# Patient Record
Sex: FEMALE | Race: WHITE | NOT HISPANIC OR LATINO | Employment: UNEMPLOYED | URBAN - METROPOLITAN AREA
[De-identification: names, ages, dates, MRNs, and addresses within clinical notes are randomized per-mention and may not be internally consistent; named-entity substitution may affect disease eponyms.]

---

## 2017-03-10 ENCOUNTER — HOSPITAL ENCOUNTER (EMERGENCY)
Facility: CLINIC | Age: 42
Discharge: HOME | End: 2017-03-10
Attending: EMERGENCY MEDICINE | Admitting: EMERGENCY MEDICINE
Payer: COMMERCIAL

## 2017-03-10 VITALS
RESPIRATION RATE: 18 BRPM | TEMPERATURE: 98.5 F | HEIGHT: 66 IN | BODY MASS INDEX: 32.14 KG/M2 | HEART RATE: 65 BPM | DIASTOLIC BLOOD PRESSURE: 71 MMHG | OXYGEN SATURATION: 98 % | WEIGHT: 200 LBS | SYSTOLIC BLOOD PRESSURE: 100 MMHG

## 2017-03-10 DIAGNOSIS — R51.9 ACUTE INTRACTABLE HEADACHE, UNSPECIFIED HEADACHE TYPE: ICD-10-CM

## 2017-03-10 PROCEDURE — 25000128 H RX IP 250 OP 636: Performed by: EMERGENCY MEDICINE

## 2017-03-10 PROCEDURE — 96374 THER/PROPH/DIAG INJ IV PUSH: CPT

## 2017-03-10 PROCEDURE — 25000125 ZZHC RX 250: Performed by: EMERGENCY MEDICINE

## 2017-03-10 PROCEDURE — 96375 TX/PRO/DX INJ NEW DRUG ADDON: CPT

## 2017-03-10 PROCEDURE — 99284 EMERGENCY DEPT VISIT MOD MDM: CPT | Mod: 25

## 2017-03-10 RX ORDER — KETOROLAC TROMETHAMINE 15 MG/ML
15 INJECTION, SOLUTION INTRAMUSCULAR; INTRAVENOUS ONCE
Status: COMPLETED | OUTPATIENT
Start: 2017-03-10 | End: 2017-03-10

## 2017-03-10 RX ORDER — PREGABALIN 225 MG/1
225 CAPSULE ORAL 2 TIMES DAILY
COMMUNITY
Start: 2016-10-25 | End: 2017-11-29

## 2017-03-10 RX ORDER — MECLIZINE HYDROCHLORIDE 25 MG/1
25 TABLET ORAL
COMMUNITY
Start: 2016-08-26 | End: 2018-03-16

## 2017-03-10 RX ORDER — CYCLOBENZAPRINE HCL 10 MG
10 TABLET ORAL AT BEDTIME
COMMUNITY
Start: 2016-08-05

## 2017-03-10 RX ORDER — DEXAMETHASONE SODIUM PHOSPHATE 10 MG/ML
10 INJECTION, SOLUTION INTRAMUSCULAR; INTRAVENOUS ONCE
Status: COMPLETED | OUTPATIENT
Start: 2017-03-10 | End: 2017-03-10

## 2017-03-10 RX ORDER — DIPHENHYDRAMINE HYDROCHLORIDE 50 MG/ML
25 INJECTION INTRAMUSCULAR; INTRAVENOUS ONCE
Status: COMPLETED | OUTPATIENT
Start: 2017-03-10 | End: 2017-03-10

## 2017-03-10 RX ORDER — METOCLOPRAMIDE HYDROCHLORIDE 5 MG/ML
10 INJECTION INTRAMUSCULAR; INTRAVENOUS ONCE
Status: COMPLETED | OUTPATIENT
Start: 2017-03-10 | End: 2017-03-10

## 2017-03-10 RX ORDER — NORTRIPTYLINE HYDROCHLORIDE 50 MG/1
CAPSULE ORAL
COMMUNITY
Start: 2017-02-20 | End: 2019-11-06

## 2017-03-10 RX ORDER — HYDROCHLOROTHIAZIDE 25 MG/1
25 TABLET ORAL
COMMUNITY
Start: 2017-01-20 | End: 2017-11-29

## 2017-03-10 RX ORDER — PANTOPRAZOLE SODIUM 40 MG/1
40 TABLET, DELAYED RELEASE ORAL EVERY MORNING
COMMUNITY
Start: 2016-03-24 | End: 2021-03-06

## 2017-03-10 RX ADMIN — DEXAMETHASONE SODIUM PHOSPHATE 10 MG: 10 INJECTION, SOLUTION INTRAMUSCULAR; INTRAVENOUS at 12:17

## 2017-03-10 RX ADMIN — METOCLOPRAMIDE 10 MG: 5 INJECTION, SOLUTION INTRAMUSCULAR; INTRAVENOUS at 12:17

## 2017-03-10 RX ADMIN — DIPHENHYDRAMINE HYDROCHLORIDE 25 MG: 50 INJECTION, SOLUTION INTRAMUSCULAR; INTRAVENOUS at 12:17

## 2017-03-10 RX ADMIN — KETOROLAC TROMETHAMINE 15 MG: 15 INJECTION, SOLUTION INTRAMUSCULAR; INTRAVENOUS at 12:17

## 2017-03-10 ASSESSMENT — ENCOUNTER SYMPTOMS
PHOTOPHOBIA: 1
HEADACHES: 1
NAUSEA: 1
NUMBNESS: 1

## 2017-03-10 NOTE — ED NOTES
Headache off and on for 2 weeks.  Trying regular headache meds at home and physical therapy and botox this week and continues to have worsening pain.  Also has an RA Flare up and asthma.

## 2017-03-10 NOTE — ED AVS SNAPSHOT
[facility] Emergency Department    [address]    [facility] [address]    Phone:  [phone]    Fax:  [phone]                                       Holli Thacker   MRN: [id number]    Department:  [facility] Emergency Department   Date of Visit:  3/10/2017           After Visit Summary Signature Page     I have received my discharge instructions, and my questions have been answered. I have discussed any challenges I see with this plan with the nurse or doctor.    ..........................................................................................................................................  Patient/Patient Representative Signature      ..........................................................................................................................................  Patient Representative Print Name and Relationship to Patient    ..................................................               ................................................  Date                                            Time    ..........................................................................................................................................  Reviewed by Signature/Title    ...................................................              ..............................................  Date                                                            Time

## 2017-03-10 NOTE — ED NOTES
IV d/c'd with catheter intact. Pressure applied until hemostasis ach'd. Pressure dressing applied. Site without edema, erythema, or pain.

## 2017-03-10 NOTE — ED AVS SNAPSHOT
[facility] Emergency Department    [address]    [facility] [address]    Phone:  [phone]    Fax:  [phone]                                       Holli Thacker   MRN: [id number]    Department:  [facility] Emergency Department   Date of Visit:  3/10/2017           Patient Information     Date Of Birth          1975        Your diagnoses for this visit were:     Acute intractable headache, unspecified headache type        You were seen by Monse Saini MD.      Follow-up Information     Follow up with [facility] Emergency Department.    Specialty:  EMERGENCY MEDICINE    Why:  As needed, If symptoms worsen    Contact information:    [address]  [facility] [address] [phone]        Discharge Instructions       Discharge Instructions  Headache    You were seen today for a headache. Headaches may be caused by many different things such as muscle tension, sinus inflammation, anxiety and stress, having too little sleep, too much alcohol, some medical conditions or injury. You may have a migraine, which is caused by changes in the blood vessels in your head.  At this time your doctor does not find that your headache is a sign of anything dangerous or life-threatening.  However, sometimes the signs of serious illness do not show up right away.  If you have new or worse symptoms, you may need to be seen again in the emergency department or by your primary doctor.      Return to the Emergency Department if:    You get a fever of 101 F or higher.    Your headache gets much worse.    You get a stiff neck with your headache.    You get a new headache that is different or worse than headaches you have had before.    You are vomiting and can t keep food or water down.    You have blurry or double vision or other problems with your eyes.    You have a new weakness on one side of your body.    You have difficulty with  balance which is new.    You or your family thinks you are confused.    You have a seizure or convulsion.    What can I do to help myself?    Pain medications - You may take a pain medication such as Tylenol  (acetaminophen), Advil , Nuprin  (ibuprofen) or Aleve  (naproxen).  If you have been given a narcotic such as Vicodin  (hydrocodone with acetaminophen), Percocet  (oxycodone with acetaminophen), codeine, or a muscle relaxant such as Flexeril  (cyclobenzaprine) or Soma  (carisoprodol), do not drive for four hours after you have taken it. If the narcotic contains Tylenol  (acetaminophen), do not take Tylenol  with it. All narcotics will cause constipation, so eat a high fiber diet.        Take a pain reliever as soon as you notice symptoms.  Starting medications as soon as you start to have symptoms may lessen the amount of pain you have.    Relaxing in a quiet, dark room may help.    Get enough sleep and eat meals regularly.    Schedule an appointment with your primary physician as instructed, or at least within 1 week.    You may need to watch for certain foods or other things which may trigger your headaches.  Keeping a journal of your headaches and possible triggers may help you and your primary doctor to identify things which you should avoid which may be causing your headaches.  If you were given a prescription for medicine here today, be sure to read all of the information (including the package insert) that comes with your prescription.  This will include important information about the medicine, its side effects, and any warnings that you need to know about.  The pharmacist who fills the prescription can provide more information and answer questions you may have about the medicine.  If you have questions or concerns that the pharmacist cannot address, please call or return to the Emergency Department.       24 Hour Appointment Hotline       To make an appointment at any [facility], call  [phone] ([phone]). If you don't have a family doctor or clinic, we will help you find one. [place] clinics are conveniently located to serve the needs of you and your family.             Review of your medicines      Our records show that you are taking the medicines listed below. If these are incorrect, please call your family doctor or clinic.        Dose / Directions Last dose taken    albuterol (2.5 MG/3ML) 0.083% neb solution   Dose:  1 vial   Quantity:  1 Box        Take 1 vial (2.5 mg) by nebulization every 4 hours as needed for shortness of breath / dyspnea or wheezing   Refills:  0        cyclobenzaprine 10 MG tablet   Commonly known as:  FLEXERIL   Dose:  10 mg        Take 10 mg by mouth   Refills:  0        fluticasone 50 MCG/ACT spray   Commonly known as:  FLONASE        Spray into both nostrils daily   Refills:  0        fluticasone-salmeterol 500-50 MCG/DOSE diskus inhaler   Commonly known as:  ADVAIR   Dose:  1 puff        Inhale 1 puff into the lungs 2 times daily   Refills:  0        hydrochlorothiazide 25 MG tablet   Commonly known as:  HYDRODIURIL   Dose:  25 mg        Take 25 mg by mouth   Refills:  0        ipratropium - albuterol 0.5 mg/2.5 mg/3 mL 0.5-2.5 (3) MG/3ML neb solution   Commonly known as:  DUONEB   Dose:  1 vial   Quantity:  1 Box        Take 1 vial (3 mLs) by nebulization 3 times daily   Refills:  3        meclizine 25 MG tablet   Commonly known as:  ANTIVERT   Dose:  25 mg        Take 25 mg by mouth   Refills:  0        nortriptyline 50 MG capsule   Commonly known as:  PAMELOR        Take on capsule at betime   Refills:  0        omalizumab 150 MG injection   Commonly known as:  XOLAIR   Dose:  150 mg        Inject 150 mg Subcutaneous See Admin Instructions   Refills:  0        pantoprazole 40 MG EC tablet   Commonly known as:  PROTONIX   Dose:  40 mg        Take 40 mg by mouth   Refills:  0        Pregabalin 225 MG Caps   Dose:  225 mg        Take 225 mg by mouth 2  times daily   Refills:  0        SINGULAIR PO   Dose:  10 mg        Take 10 mg by mouth   Refills:  0        ZOLOFT PO   Dose:  100 mg        Take 100 mg by mouth daily   Refills:  0                Orders Needing Specimen Collection     None      Pending Results     No orders found from 3/8/2017 to 3/11/2017.            Pending Culture Results     No orders found from 3/8/2017 to 3/11/2017.             Test Results from your hospital stay            Clinical Quality Measure: Blood Pressure Screening     Your blood pressure was checked while you were in the emergency department today. The last reading we obtained was  BP: 122/86 . Please read the guidelines below about what these numbers mean and what you should do about them.  If your systolic blood pressure (the top number) is less than 120 and your diastolic blood pressure (the bottom number) is less than 80, then your blood pressure is normal. There is nothing more that you need to do about it.  If your systolic blood pressure (the top number) is 120-139 or your diastolic blood pressure (the bottom number) is 80-89, your blood pressure may be higher than it should be. You should have your blood pressure rechecked within a year by a primary care provider.  If your systolic blood pressure (the top number) is 140 or greater or your diastolic blood pressure (the bottom number) is 90 or greater, you may have high blood pressure. High blood pressure is treatable, but if left untreated over time it can put you at risk for heart attack, stroke, or kidney failure. You should have your blood pressure rechecked by a primary care provider within the next 4 weeks.  If your provider in the emergency department today gave you specific instructions to follow-up with your doctor or provider even sooner than that, you should follow that instruction and not wait for up to 4 weeks for your follow-up visit.        Thank you for choosing [facility]       Thank you for choosing [facility]  for your care. Our goal is always to provide you with excellent care. Hearing back from our patients is one way we can continue to improve our services. Please take a few minutes to complete the written survey that you may receive in the mail after you visit with us. Thank you!        MOV[facility] Information     [facility] gives you secure access to your electronic health record. If you see a primary care provider, you can also send messages to your care team and make appointments. If you have questions, please call your primary care clinic.  If you do not have a primary care provider, please call [phone] and they will assist you.        Care EveryWhere ID     This is your Care EveryWhere ID. This could be used by other organizations to access your [place] medical records  [id number]        After Visit Summary       This is your record. Keep this with you and show to your community pharmacist(s) and doctor(s) at your next visit.

## 2017-03-10 NOTE — ED PROVIDER NOTES
History     Chief Complaint:    Headache      HPI   Holli Thacker is a 41 year old female with a history of migraines and chiari malformation who presents with a headache. The patient states that she has had an intermittent headache for the last two weeks, which is similar to her normal presentation of migraine. Specifically, this is a frontal headache with radiation to the back of her head. She has associated nausea, photophobia, and sensitivity to sound. The patient also states that she has some left sided facial numbness, but this is typical for her severe migraine headaches. The patient states that she also has rheumatoid arthritis and is currently having a flare; she is on 20 mg of prednisone at this time for this. The patient states that she has been consulting with her neurologist for her current headache, and that she has only received temporary relief with physical therapy, a botox injection, naproxen, promethazine, and a triptan. She states that her headache became unbearable this morning and she presents to the emergency department for further evaluation and treatment.        Allergies:  Azithromycin  Erythromycin  Verapamil    Medications:    Pregabalin 225 mg caps  Pantoprazole (protonix) 40 mg ec tablet  Omalizumab (xolair) 150 mg injection  Meclizine (antivert) 25 mg tablet  Cyclobenzaprine (flexeril) 10 mg tablet  Nortriptyline (pamelor) 50 mg capsule  Hydrochlorothiazide (hydrodiuril) 25 mg tablet  Ipratropium - albuterol 0.5 mg/2.5 mg/3 ml (duoneb) 0.5-2.5 (3) mg/3ml nebulization  Albuterol (2.5 mg/3ml) 0.083% nebulizer solution  Sertraline hcl (zoloft po)  Fluticasone-salmeterol (advair) 500-50 mcg/dose diskus inhaler  Fluticasone (flonase) 50 mcg/act nasal spray  Montelukast sodium (singulair po)    Past Medical History:    Anxiety  Arnold-Chiari malformation, type I (H)  Depressive disorder  Fibromyalgia  Migraine  Rheumatoid arthritis flare (H)  Uncomplicated asthma    Past Surgical  History:    Gyn surgery   Ovary surgery     Family History:    History reviewed. No pertinent family history.     Social History:  Marital Status:   Presents to the ED alone  Tobacco Use: Current everyday smoker  Alcohol Use: No  PCP: Holly Fung      Review of Systems   HENT:        Positive for sensitivity to sound   Eyes: Positive for photophobia.   Gastrointestinal: Positive for nausea.   Neurological: Positive for numbness and headaches.   All other systems reviewed and are negative.        Physical Exam   First Vitals:  BP: 122/86  Pulse: 96  Temp: 98.5  F (36.9  C)  Resp: 18  SpO2: 98 %    Physical Exam    General/Appearance: appears stated age, well-groomed, appears comfortable  Eyes: EOMI, no scleral injection, no icterus  ENT: MMM  Neck: supple, nl ROM, no stiffness  Cardiovascular: RRR, nl S1S2, no m/r/g, 2+ pulses in all 4 extremities, cap refill <2sec  Respiratory: CTAB, good air movement throughout, no wheezes/rhonchi/rales, no increased WOB, no retractions  MSK: ROMERO, good tone, no bony abnormality  Skin: warm and well-perfused, no rash, no edema, no ecchymosis, nl turgor  Neuro: GCS 15, alert and oriented, no gross focal neuro deficits  Psych: interacts appropriately  Heme: no petechia, no purpura, no active bleeding      Emergency Department Course     Interventions:  (1217) Decadron, 10 mg, IV  (1217) Toradol, 15 mg, IV injection   (1217) Reglan, 10 mg, IV   (1217) Benadryl, 25 mg, IV injection     Emergency Department Course:  Nursing notes and vitals reviewed.  I performed an exam of the patient as documented above.   (0215) I rechecked on the patient. She is feeling much better with the above interventions.   Findings and plan explained to the patient. Patient discharged home with instructions regarding supportive care, medications, and reasons to return. The importance of close follow-up was reviewed.     Impression & Plan      Medical Decision Making:  This pt presents with a  headache consistent with a primary headache, consistent with her usual headaches.  The patient's neurologic exam in the emergency department is normal. The patient has not had any fever, weakness, paresthesia, confusion. The numbness she has to her L face is consistent with sxs she's had in the past (and is followed by Neuro for). Meningitis/intracranial infection, subarachnoid hemorrhage/intracranial hemorrhage, CNS tumor, increased IOP, temporal arteritis, and stroke/venous thrombosis are considered as part of the differential. This is not the worst headache of the pt's  life and it started gradually, so I believe subarachnoid hemorrhage is unlikely. There has been no recent trauma, nor is the pt on blood thinners, which lower my concern for intracranial bleed. The patient is afebrile without neck stiffness, so I believe meningitis is unlikely/ The patient has a normal neurologic exam so CNS tumor, stroke, Venous thrombosis are unlikely.   The pain has improved with medication interventions. The pt has been Instructed to return if their pain increases, they develop a fever, neuro sxs, vomiting, or any other new and concerning symptoms.    Diagnosis:    ICD-10-CM    1. Acute intractable headache, unspecified headache type R51      Disposition:  Discharge to home.        I, Curtis Martinez, am serving as a scribe on 3/10/2017 at 11:47 AM to personally document services performed by Dr. Saini based on my observations and the provider's statements to me.     3/10/2017   [facility] EMERGENCY DEPARTMENT       Monse Saini MD  03/10/17 1524

## 2017-06-03 ENCOUNTER — HEALTH MAINTENANCE LETTER (OUTPATIENT)
Age: 42
End: 2017-06-03

## 2017-11-13 DIAGNOSIS — L50.1 URTICARIA, IDIOPATHIC: Primary | ICD-10-CM

## 2017-11-29 ENCOUNTER — INFUSION THERAPY VISIT (OUTPATIENT)
Dept: INFUSION THERAPY | Facility: CLINIC | Age: 42
End: 2017-11-29
Attending: INTERNAL MEDICINE
Payer: COMMERCIAL

## 2017-11-29 VITALS
WEIGHT: 183.6 LBS | TEMPERATURE: 98.8 F | BODY MASS INDEX: 29.63 KG/M2 | SYSTOLIC BLOOD PRESSURE: 117 MMHG | DIASTOLIC BLOOD PRESSURE: 62 MMHG | RESPIRATION RATE: 16 BRPM | OXYGEN SATURATION: 99 % | HEART RATE: 90 BPM

## 2017-11-29 DIAGNOSIS — L50.1 URTICARIA, IDIOPATHIC: Primary | ICD-10-CM

## 2017-11-29 PROCEDURE — 96372 THER/PROPH/DIAG INJ SC/IM: CPT

## 2017-11-29 PROCEDURE — 25000128 H RX IP 250 OP 636: Performed by: INTERNAL MEDICINE

## 2017-11-29 RX ORDER — GLUCOSAMINE HCL 500 MG
5000 TABLET ORAL DAILY
COMMUNITY

## 2017-11-29 RX ADMIN — OMALIZUMAB 300 MG: 202.5 INJECTION, SOLUTION SUBCUTANEOUS at 13:08

## 2017-11-29 NOTE — PROGRESS NOTES
Infusion Nursing Note:  Holli Thacker presents today for Xolair.    Patient seen by provider today: No   present during visit today: Not Applicable.    Note: Patient has been getting Xolair at [facility].     Intravenous Access:  No Intravenous access/labs at this visit.    Treatment Conditions:  Not Applicable.      Post Infusion Assessment:  Patient tolerated injection without incident.  Patient observed for 20 minutes post Xolair per protocol.  She refused to stay for 30 minutes because she had an appt at 1400 in [facility].    Discharge Plan:   Patient declined prescription refills.  Copy of AVS reviewed with patient and/or family.  Patient will return 12/20 for next appointment.    Mercedes Breaux RN

## 2017-11-29 NOTE — MR AVS SNAPSHOT
After Visit Summary   11/29/2017    Holli Thacker    MRN: [id number]           Patient Information     Date Of Birth          1975        Visit Information        Provider Department      11/29/2017 12:30 PM [facility] INFUSION CHAIR 11 [facility]        Today's Diagnoses     Urticaria, idiopathic    -  1       Follow-ups after your visit        Your next 10 appointments already scheduled     Dec 20, 2017 12:30 PM CST   Level 1 with RH INFUSION CHAIR 3   [facility] ([facility])    [facility]  [address]  [facility] [id number] [phone]              Who to contact     If you have questions or need follow up information about today's clinic visit or your schedule please contact [facility] directly at [phone].  Normal or non-critical lab and imaging results will be communicated to you by Alianzahart, letter or phone within 4 business days after the clinic has received the results. If you do not hear from us within 7 days, please contact the clinic through Alianz[facility]art or phone. If you have a critical or abnormal lab result, we will notify you by phone as soon as possible.  Submit refill requests through [facility] or call your pharmacy and they will forward the refill request to us. Please allow 3 business days for your refill to be completed.          Additional Information About Your Visit        MyChart Information     [facility] gives you secure access to your electronic health record. If you see a primary care provider, you can also send messages to your care team and make appointments. If you have questions, please call your primary care clinic.  If you do not have a primary care provider, please call [phone] and they will assist you.        Care EveryWhere ID     This is your Care EveryWhere ID. This could be used by other organizations  to access your [place] medical records  [id number]        Your Vitals Were     Pulse Temperature Respirations Pulse Oximetry BMI (Body Mass Index)       90 98.8  F (37.1  C) (Tympanic) 16 99% 29.63 kg/m2        Blood Pressure from Last 3 Encounters:   11/29/17 117/62   03/10/17 100/71   08/15/16 128/72    Weight from Last 3 Encounters:   11/29/17 83.3 kg (183 lb 9.6 oz)   03/10/17 90.7 kg (200 lb)   08/15/16 87.1 kg (192 lb)              Today, you had the following     No orders found for display         Today's Medication Changes          These changes are accurate as of: 11/29/17  2:47 PM.  If you have any questions, ask your nurse or doctor.               Stop taking these medicines if you haven't already. Please contact your care team if you have questions.     Pregabalin 225 MG Caps                    Primary Care Provider Office Phone # Fax #    Holly Fung [phone]       [facility] [address] [facility] [address]        Equal Access to Services     [facility]: Hadii aad ku hadasho Soarielali, waaxda luqadaha, qaybta kaalmada roxana, anaid reyes . So [facility] [phone].    ATENCIÓN: Si habla español, tiene a trinidad disposición servicios gratuitos de asistencia lingüística. [facility] [phone].    We comply with applicable federal civil rights laws and [place] laws. We do not discriminate on the basis of race, color, national origin, age, disability, sex, sexual orientation, or gender identity.            Thank you!     Thank you for choosing [facility]  for your care. Our goal is always to provide you with excellent care. Hearing back from our patients is one way we can continue to improve our services. Please take a few minutes to complete the written survey that you may receive in the mail after your visit with us. Thank you!             Your Updated Medication List - Protect others  around you: Learn how to safely use, store and throw away your medicines at www.disposemymeds.org.          This list is accurate as of: 11/29/17  2:47 PM.  Always use your most recent med list.                   Brand Name Dispense Instructions for use Diagnosis    albuterol (2.5 MG/3ML) 0.083% neb solution     1 Box    Take 1 vial (2.5 mg) by nebulization every 4 hours as needed for shortness of breath / dyspnea or wheezing        cyclobenzaprine 10 MG tablet    FLEXERIL     Take 10 mg by mouth        fluticasone 50 MCG/ACT spray    FLONASE     Spray into both nostrils daily        fluticasone-salmeterol 500-50 MCG/DOSE diskus inhaler    ADVAIR     Inhale 1 puff into the lungs 2 times daily        ipratropium - albuterol 0.5 mg/2.5 mg/3 mL 0.5-2.5 (3) MG/3ML neb solution    DUONEB    1 Box    Take 1 vial (3 mLs) by nebulization 3 times daily        meclizine 25 MG tablet    ANTIVERT     Take 25 mg by mouth        NAPROXEN PO      Take 500 mg by mouth 2 times daily as needed for moderate pain        nortriptyline 50 MG capsule    PAMELOR     Take on capsule at betime        omalizumab 150 MG injection    XOLAIR     Inject 150 mg Subcutaneous See Admin Instructions        pantoprazole 40 MG EC tablet    PROTONIX     Take 40 mg by mouth        SINGULAIR PO      Take 10 mg by mouth        Vitamin D3 3000 UNITS Tabs      Take 5,000 Units by mouth daily        XELJANZ 5 MG tablet   Generic drug:  tofacitinib      Take 5 mg by mouth 2 times daily        ZOLOFT PO      Take 200 mg by mouth daily        ZOMIG PO      Take 5 mg by mouth at onset of headache for migraine        ZONEGRAN PO      Take 200 mg by mouth daily

## 2018-01-29 ENCOUNTER — INFUSION THERAPY VISIT (OUTPATIENT)
Dept: INFUSION THERAPY | Facility: CLINIC | Age: 43
End: 2018-01-29
Attending: INTERNAL MEDICINE
Payer: COMMERCIAL

## 2018-01-29 VITALS
RESPIRATION RATE: 18 BRPM | DIASTOLIC BLOOD PRESSURE: 68 MMHG | OXYGEN SATURATION: 94 % | HEART RATE: 97 BPM | SYSTOLIC BLOOD PRESSURE: 124 MMHG | TEMPERATURE: 97.4 F

## 2018-01-29 DIAGNOSIS — L50.1 URTICARIA, IDIOPATHIC: Primary | ICD-10-CM

## 2018-01-29 PROCEDURE — 96372 THER/PROPH/DIAG INJ SC/IM: CPT

## 2018-01-29 PROCEDURE — 25000128 H RX IP 250 OP 636: Performed by: INTERNAL MEDICINE

## 2018-01-29 RX ADMIN — OMALIZUMAB 300 MG: 202.5 INJECTION, SOLUTION SUBCUTANEOUS at 10:34

## 2018-01-29 ASSESSMENT — PAIN SCALES - GENERAL: PAINLEVEL: MODERATE PAIN (4)

## 2018-01-29 NOTE — MR AVS SNAPSHOT
After Visit Summary   1/29/2018    Holli Thacker    MRN: [id number]           Patient Information     Date Of Birth          1975        Visit Information        Provider Department      1/29/2018 10:00 AM RH INFUSION CHAIR 10 [facility]        Today's Diagnoses     Urticaria, idiopathic    -  1       Follow-ups after your visit        Your next 10 appointments already scheduled     Feb 21, 2018 11:00 AM CST   Level 1 with RH INFUSION CHAIR 7   [facility] ([facility])    [facility]  [address]  [facility] [address]   [phone]            Mar 14, 2018 11:00 AM CDT   Level 1 with RH INFUSION CHAIR 6   [facility] ([facility])    [facility]  [address]  [facility] [address]   [phone]            Apr 05, 2018 11:00 AM CDT   Level 1 with RH INFUSION CHAIR 11   [facility] ([facility])    [facility]  [address]  [facility] [address]   [phone]              Who to contact     If you have questions or need follow up information about today's clinic visit or your schedule please contact [facility] INFUSION SERVICES directly at [phone].  Normal or non-critical lab and imaging results will be communicated to you by MyChart, letter or phone within 4 business days after the clinic has received the results. If you do not hear from us within 7 days, please contact the clinic through MyChart or phone. If you have a critical or abnormal lab result, we will notify you by phone as soon as possible.  Submit refill requests through [employer] or call your pharmacy and they will forward the refill request to us. Please allow 3 business days for your refill to be completed.           Additional Information About Your Visit        MyChart Information     [employer] gives you secure access to your electronic health record. If you see a primary care provider, you can also send messages to your care team and make appointments. If you have questions, please call your primary care clinic.  If you do not have a primary care provider, please call [phone] and they will assist you.        Care EveryWhere ID     This is your Care EveryWhere ID. This could be used by other organizations to access your [place] medical records  [id number]        Your Vitals Were     Pulse Temperature Respirations Pulse Oximetry          97 97.4  F (36.3  C) 18 94%         Blood Pressure from Last 3 Encounters:   01/29/18 124/68   11/29/17 117/62   03/10/17 100/71    Weight from Last 3 Encounters:   11/29/17 83.3 kg (183 lb 9.6 oz)   03/10/17 90.7 kg (200 lb)   08/15/16 87.1 kg (192 lb)              Today, you had the following     No orders found for display       Primary Care Provider Office Phone # Fax #    Holly Fung [phone]       [facility] [address]  [place] [address]        Equal Access to Services     ROBERT KAT : Hadii aad ku hadasho Soomaali, waaxda luqadaha, qaybta kaalmada adeegyada, anaid bernardin [address] rd reyes . So [facility] [phone].    ATENCIÓN: Si habla español, tiene a trinidad disposición servicios gratuitos de asistencia lingüística. Llame al [phone].    We comply with applicable federal civil rights laws and [place] laws. We do not discriminate on the basis of race, color, national origin, age, disability, sex, sexual orientation, or gender identity.            Thank you!     Thank you for choosing [facility] INFUSION SERVICES  for your care. Our goal is always to provide you with excellent care. Hearing back from our patients is one way we can continue to improve our services. Please take a few minutes to complete  the written survey that you may receive in the mail after your visit with us. Thank you!             Your Updated Medication List - Protect others around you: Learn how to safely use, store and throw away your medicines at www.disposemymeds.org.          This list is accurate as of 1/29/18 11:41 AM.  Always use your most recent med list.                   Brand Name Dispense Instructions for use Diagnosis    albuterol (2.5 MG/3ML) 0.083% neb solution     1 Box    Take 1 vial (2.5 mg) by nebulization every 4 hours as needed for shortness of breath / dyspnea or wheezing        cyclobenzaprine 10 MG tablet    FLEXERIL     Take 10 mg by mouth        fluticasone 50 MCG/ACT spray    FLONASE     Spray into both nostrils daily        fluticasone-salmeterol 500-50 MCG/DOSE diskus inhaler    ADVAIR     Inhale 1 puff into the lungs 2 times daily        ipratropium - albuterol 0.5 mg/2.5 mg/3 mL 0.5-2.5 (3) MG/3ML neb solution    DUONEB    1 Box    Take 1 vial (3 mLs) by nebulization 3 times daily        meclizine 25 MG tablet    ANTIVERT     Take 25 mg by mouth        NAPROXEN PO      Take 500 mg by mouth 2 times daily as needed for moderate pain        nortriptyline 50 MG capsule    PAMELOR     Take on capsule at betime        omalizumab 150 MG injection    XOLAIR     Inject 150 mg Subcutaneous See Admin Instructions        pantoprazole 40 MG EC tablet    PROTONIX     Take 40 mg by mouth        SINGULAIR PO      Take 10 mg by mouth        Vitamin D3 3000 UNITS Tabs      Take 5,000 Units by mouth daily        XELJANZ 5 MG tablet   Generic drug:  tofacitinib      Take 5 mg by mouth 2 times daily        ZOLOFT PO      Take 200 mg by mouth daily        ZOMIG PO      Take 5 mg by mouth at onset of headache for migraine        ZONEGRAN PO      Take 200 mg by mouth daily

## 2018-02-21 ENCOUNTER — INFUSION THERAPY VISIT (OUTPATIENT)
Dept: INFUSION THERAPY | Facility: CLINIC | Age: 43
End: 2018-02-21
Attending: INTERNAL MEDICINE
Payer: COMMERCIAL

## 2018-02-21 VITALS
TEMPERATURE: 97 F | DIASTOLIC BLOOD PRESSURE: 67 MMHG | SYSTOLIC BLOOD PRESSURE: 117 MMHG | RESPIRATION RATE: 16 BRPM | OXYGEN SATURATION: 97 % | HEART RATE: 85 BPM

## 2018-02-21 DIAGNOSIS — L50.1 URTICARIA, IDIOPATHIC: Primary | ICD-10-CM

## 2018-02-21 PROCEDURE — 96372 THER/PROPH/DIAG INJ SC/IM: CPT

## 2018-02-21 PROCEDURE — 25000128 H RX IP 250 OP 636: Performed by: INTERNAL MEDICINE

## 2018-02-21 RX ADMIN — OMALIZUMAB 300 MG: 202.5 INJECTION, SOLUTION SUBCUTANEOUS at 11:22

## 2018-02-21 NOTE — PROGRESS NOTES
Infusion Nursing Note:  Holli ROOSEVELT Linoey Kedar presents today for Xolair.    Patient seen by provider today: No   present during visit today: Not Aplicable    Note: N/A.    Intravenous Access:  No Intravenous access/labs at this visit.    Treatment Conditions:  Not Applicable.      Post Infusion Assessment:  Patient tolerated injection without incident.  Patient observed for 30 minutes post Xolair per protocol.    Discharge Plan:   Discharge instructions reviewed with: Patient and Family.  Patient and/or family verbalized understanding of discharge instructions and all questions answered.  AVS to patient via [employer].  Patient will return 3/14/2018 for next appointment.   Patient discharged in stable condition accompanied by: daughter.  Departure Mode: Ambulatory.    Cheryl Billings RN

## 2018-02-21 NOTE — MR AVS SNAPSHOT
After Visit Summary   2/21/2018    Holli Thacker    MRN: [id number]           Patient Information     Date Of Birth          1975        Visit Information        Provider Department      2/21/2018 11:00 AM RH INFUSION CHAIR 7 [facility] Infusion [facility]        Today's Diagnoses     Urticaria, idiopathic    -  1       Follow-ups after your visit        Your next 10 appointments already scheduled     Mar 14, 2018 11:00 AM CDT   Level 1 with RH INFUSION CHAIR 11   [facility] Infusion [facility] ([facility])    [facility]  [address]  [facility] [address]   [phone]            Apr 05, 2018 11:00 AM CDT   Level 1 with RH INFUSION CHAIR 11   [facility] Infusion [facility] ([facility])    [facility]  [address]  [facility] [id number] [phone]              Who to contact     If you have questions or need follow up information about today's clinic visit or your schedule please contact [facility] INFUSION SERVICES directly at [phone].  Normal or non-critical lab and imaging results will be communicated to you by for; to (do) [employer], letter or phone within 4 business days after the clinic has received the results. If you do not hear from us within 7 days, please contact the clinic through for; to (do) Centers[employer] or phone. If you have a critical or abnormal lab result, we will notify you by phone as soon as possible.  Submit refill requests through [employer] or call your pharmacy and they will forward the refill request to us. Please allow 3 business days for your refill to be completed.          Additional Information About Your Visit        MyChart Information     [employer] gives you secure access to your electronic health record. If you see a primary care provider, you can also send messages to your care team and make appointments. If you have  questions, please call your primary care clinic.  If you do not have a primary care provider, please call [phone] and they will assist you.        Care EveryWhere ID     This is your Care EveryWhere ID. This could be used by other organizations to access your [place] medical records  [id number]        Your Vitals Were     Pulse Temperature Respirations Pulse Oximetry          85 97  F (36.1  C) (Tympanic) 16 97%         Blood Pressure from Last 3 Encounters:   02/21/18 117/67   01/29/18 124/68   11/29/17 117/62    Weight from Last 3 Encounters:   11/29/17 83.3 kg (183 lb 9.6 oz)   03/10/17 90.7 kg (200 lb)   08/15/16 87.1 kg (192 lb)              Today, you had the following     No orders found for display       Primary Care Provider Office Phone # Fax #    Holly Fung [phone]       [facility] [address] [place] [address]        Equal Access to Services     [facility]: Hadii aad ku hadasho Soomaali, waaxda luqadaha, qaybta kaalmada adeegyada, waxay joanain hayaan rd reyes . So [facility] [phone].    ATENCIÓN: Si habla español, tiene a trinidad disposición servicios gratuitos de asistencia lingüística. Ll[facility] [phone].    We comply with applicable federal civil rights laws and [place] laws. We do not discriminate on the basis of race, color, national origin, age, disability, sex, sexual orientation, or gender identity.            Thank you!     Thank you for choosing [facility] INFUSION SERVICES  for your care. Our goal is always to provide you with excellent care. Hearing back from our patients is one way we can continue to improve our services. Please take a few minutes to complete the written survey that you may receive in the mail after your visit with us. Thank you!             Your Updated Medication List - Protect others around you: Learn how to safely use, store and throw away your medicines at  www.disposemymeds.org.          This list is accurate as of 2/21/18  2:34 PM.  Always use your most recent med list.                   Brand Name Dispense Instructions for use Diagnosis    albuterol (2.5 MG/3ML) 0.083% neb solution     1 Box    Take 1 vial (2.5 mg) by nebulization every 4 hours as needed for shortness of breath / dyspnea or wheezing        cyclobenzaprine 10 MG tablet    FLEXERIL     Take 10 mg by mouth        fluticasone 50 MCG/ACT spray    FLONASE     Spray into both nostrils daily        fluticasone-salmeterol 500-50 MCG/DOSE diskus inhaler    ADVAIR     Inhale 1 puff into the lungs 2 times daily        ipratropium - albuterol 0.5 mg/2.5 mg/3 mL 0.5-2.5 (3) MG/3ML neb solution    DUONEB    1 Box    Take 1 vial (3 mLs) by nebulization 3 times daily        meclizine 25 MG tablet    ANTIVERT     Take 25 mg by mouth        NAPROXEN PO      Take 500 mg by mouth 2 times daily as needed for moderate pain        nortriptyline 50 MG capsule    PAMELOR     Take on capsule at betime        omalizumab 150 MG injection    XOLAIR     Inject 150 mg Subcutaneous See Admin Instructions        pantoprazole 40 MG EC tablet    PROTONIX     Take 40 mg by mouth        SINGULAIR PO      Take 10 mg by mouth        Vitamin D3 3000 UNITS Tabs      Take 5,000 Units by mouth daily        XELJANZ 5 MG tablet   Generic drug:  tofacitinib      Take 5 mg by mouth 2 times daily        ZOLOFT PO      Take 200 mg by mouth daily        ZOMIG PO      Take 5 mg by mouth at onset of headache for migraine        ZONEGRAN PO      Take 200 mg by mouth daily

## 2018-03-15 ENCOUNTER — HOSPITAL ENCOUNTER (EMERGENCY)
Facility: CLINIC | Age: 43
Discharge: HOME | End: 2018-03-15
Attending: EMERGENCY MEDICINE | Admitting: EMERGENCY MEDICINE
Payer: COMMERCIAL

## 2018-03-15 ENCOUNTER — APPOINTMENT (OUTPATIENT)
Dept: ULTRASOUND IMAGING | Facility: CLINIC | Age: 43
End: 2018-03-15
Attending: EMERGENCY MEDICINE
Payer: COMMERCIAL

## 2018-03-15 VITALS
DIASTOLIC BLOOD PRESSURE: 45 MMHG | HEART RATE: 79 BPM | TEMPERATURE: 97.6 F | BODY MASS INDEX: 26.63 KG/M2 | RESPIRATION RATE: 16 BRPM | WEIGHT: 165 LBS | SYSTOLIC BLOOD PRESSURE: 93 MMHG | OXYGEN SATURATION: 95 %

## 2018-03-15 DIAGNOSIS — N83.202 LEFT OVARIAN CYST: ICD-10-CM

## 2018-03-15 LAB
ALBUMIN UR-MCNC: NEGATIVE MG/DL
ANION GAP SERPL CALCULATED.3IONS-SCNC: 6 MMOL/L (ref 3–14)
APPEARANCE UR: CLEAR
BACTERIA #/AREA URNS HPF: ABNORMAL /HPF
BILIRUB UR QL STRIP: NEGATIVE
BUN SERPL-MCNC: 11 MG/DL (ref 7–30)
CALCIUM SERPL-MCNC: 8.5 MG/DL (ref 8.5–10.1)
CHLORIDE SERPL-SCNC: 110 MMOL/L (ref 94–109)
CO2 SERPL-SCNC: 24 MMOL/L (ref 20–32)
COLOR UR AUTO: ABNORMAL
CREAT SERPL-MCNC: 0.83 MG/DL (ref 0.52–1.04)
ERYTHROCYTE [DISTWIDTH] IN BLOOD BY AUTOMATED COUNT: 14 % (ref 10–15)
GFR SERPL CREATININE-BSD FRML MDRD: 75 ML/MIN/1.7M2
GLUCOSE SERPL-MCNC: 117 MG/DL (ref 70–99)
GLUCOSE UR STRIP-MCNC: NEGATIVE MG/DL
HCG UR QL: NEGATIVE
HCT VFR BLD AUTO: 38.6 % (ref 35–47)
HGB BLD-MCNC: 12.5 G/DL (ref 11.7–15.7)
HGB UR QL STRIP: NEGATIVE
KETONES UR STRIP-MCNC: NEGATIVE MG/DL
LEUKOCYTE ESTERASE UR QL STRIP: NEGATIVE
MCH RBC QN AUTO: 30.1 PG (ref 26.5–33)
MCHC RBC AUTO-ENTMCNC: 32.4 G/DL (ref 31.5–36.5)
MCV RBC AUTO: 93 FL (ref 78–100)
NITRATE UR QL: NEGATIVE
PH UR STRIP: 7 PH (ref 5–7)
PLATELET # BLD AUTO: 328 10E9/L (ref 150–450)
POTASSIUM SERPL-SCNC: 3.7 MMOL/L (ref 3.4–5.3)
RBC # BLD AUTO: 4.15 10E12/L (ref 3.8–5.2)
RBC #/AREA URNS AUTO: <1 /HPF (ref 0–2)
SODIUM SERPL-SCNC: 140 MMOL/L (ref 133–144)
SOURCE: ABNORMAL
SP GR UR STRIP: 1 (ref 1–1.03)
SQUAMOUS #/AREA URNS AUTO: 3 /HPF (ref 0–1)
UROBILINOGEN UR STRIP-MCNC: 0 MG/DL (ref 0–2)
WBC # BLD AUTO: 9.4 10E9/L (ref 4–11)
WBC #/AREA URNS AUTO: <1 /HPF (ref 0–5)

## 2018-03-15 PROCEDURE — 96376 TX/PRO/DX INJ SAME DRUG ADON: CPT

## 2018-03-15 PROCEDURE — 99285 EMERGENCY DEPT VISIT HI MDM: CPT | Mod: 25

## 2018-03-15 PROCEDURE — 81001 URINALYSIS AUTO W/SCOPE: CPT | Performed by: EMERGENCY MEDICINE

## 2018-03-15 PROCEDURE — 96361 HYDRATE IV INFUSION ADD-ON: CPT

## 2018-03-15 PROCEDURE — 85027 COMPLETE CBC AUTOMATED: CPT | Performed by: EMERGENCY MEDICINE

## 2018-03-15 PROCEDURE — 96375 TX/PRO/DX INJ NEW DRUG ADDON: CPT

## 2018-03-15 PROCEDURE — 25000128 H RX IP 250 OP 636: Performed by: EMERGENCY MEDICINE

## 2018-03-15 PROCEDURE — 81025 URINE PREGNANCY TEST: CPT | Performed by: EMERGENCY MEDICINE

## 2018-03-15 PROCEDURE — 96374 THER/PROPH/DIAG INJ IV PUSH: CPT

## 2018-03-15 PROCEDURE — 80048 BASIC METABOLIC PNL TOTAL CA: CPT | Performed by: EMERGENCY MEDICINE

## 2018-03-15 PROCEDURE — 76830 TRANSVAGINAL US NON-OB: CPT

## 2018-03-15 RX ORDER — ONDANSETRON 4 MG/1
4 TABLET, ORALLY DISINTEGRATING ORAL EVERY 6 HOURS PRN
Qty: 10 TABLET | Refills: 0 | Status: SHIPPED | OUTPATIENT
Start: 2018-03-15 | End: 2018-03-18

## 2018-03-15 RX ORDER — ONDANSETRON 2 MG/ML
8 INJECTION INTRAMUSCULAR; INTRAVENOUS ONCE
Status: COMPLETED | OUTPATIENT
Start: 2018-03-15 | End: 2018-03-15

## 2018-03-15 RX ORDER — OXYCODONE HYDROCHLORIDE 5 MG/1
5-10 TABLET ORAL EVERY 4 HOURS PRN
Qty: 15 TABLET | Refills: 0 | Status: SHIPPED | OUTPATIENT
Start: 2018-03-15 | End: 2019-08-21

## 2018-03-15 RX ORDER — HYDROMORPHONE HYDROCHLORIDE 1 MG/ML
0.5 INJECTION, SOLUTION INTRAMUSCULAR; INTRAVENOUS; SUBCUTANEOUS
Status: DISCONTINUED | OUTPATIENT
Start: 2018-03-15 | End: 2018-03-15 | Stop reason: HOSPADM

## 2018-03-15 RX ADMIN — Medication 0.5 MG: at 14:01

## 2018-03-15 RX ADMIN — SODIUM CHLORIDE 1000 ML: 9 INJECTION, SOLUTION INTRAVENOUS at 13:46

## 2018-03-15 RX ADMIN — ONDANSETRON 8 MG: 2 INJECTION INTRAMUSCULAR; INTRAVENOUS at 14:02

## 2018-03-15 RX ADMIN — Medication 0.5 MG: at 14:44

## 2018-03-15 ASSESSMENT — ENCOUNTER SYMPTOMS
ABDOMINAL PAIN: 1
VOMITING: 0
LIGHT-HEADEDNESS: 1
NAUSEA: 1
BACK PAIN: 1
DYSURIA: 0

## 2018-03-15 NOTE — DISCHARGE INSTRUCTIONS
Please make an appointment to follow up with [facility] [phone] in 3-5 days even if entirely better.    Discharge Instructions  Ovarian Cyst    Abdominal (belly) pain can be caused by many things. Your provider today has found that you have a cyst on the ovary. Women in their reproductive years form cysts every month, but only cause pain if they are very large, or if they rupture and release blood or fluid. Fortunately, they rarely require surgery or hospitalization. The pain from a ruptured cyst usually gets gradually better, and should be much better within a few days. If there is a large cyst, it will usually go away within 1-2 months, but needs to be watched to be sure it does go away, since sometimes a large cyst can become a cancer. There can be complications of a cyst, or other problems that cannot be found right away, so it is very important that you follow up as directed.      Generally, every Emergency Department visit should have a follow-up clinic visit with either a primary or a specialty clinic/provider. Please follow-up as instructed by your emergency provider today.    Return to the Emergency Department right away if:    Your pain becomes much worse or constant    You get an oral temperature above 100.4 F or as directed by your provider.    You have frequent vomiting    You faint, or feel very weak.    You have new symptoms or anything that worries you.    What can I do to help myself?    Take any medication prescribed by your provider.    You may use Tylenol  (acetaminophen) or Advil , Motrin  (ibuprofen) for pain. Be sure to read and follow the package directions, and ask your provider if you have questions.    Avoid sex for several days, because it will probably be painful.    If you were given a prescription for medicine here today, be sure to read all of the information (including the package insert) that comes with your prescription.  This will include important information about  the medicine, its side effects, and any warnings that you need to know about.  The pharmacist who fills the prescription can provide more information and answer questions you may have about the medicine.  If you have questions or concerns that the pharmacist cannot address, please call or return to the Emergency Department.     Remember that you can always come back to the Emergency Department if you are not able to see your regular provider in the amount of time listed above, if you get any new symptoms, or if there is anything that worries you.

## 2018-03-15 NOTE — ED AVS SNAPSHOT
[facility] Emergency Department    [address]    [facility] [address]    Phone:  [phone]    Fax:  [phone]                                       Holli Thacker   MRN: [id number]    Department:  [facility] Emergency Department   Date of Visit:  3/15/2018           After Visit Summary Signature Page     I have received my discharge instructions, and my questions have been answered. I have discussed any challenges I see with this plan with the nurse or doctor.    ..........................................................................................................................................  Patient/Patient Representative Signature      ..........................................................................................................................................  Patient Representative Print Name and Relationship to Patient    ..................................................               ................................................  Date                                            Time    ..........................................................................................................................................  Reviewed by Signature/Title    ...................................................              ..............................................  Date                                                            Time

## 2018-03-15 NOTE — ED PROVIDER NOTES
History     Chief Complaint:  Abdominal Pain    HPI   Holli Thacker is a 42 year old female with a history of ovarian cyst who presents to the emergency department for evaluation of LLQ abdominal pain. The patient states that on Monday she gradually developed abdominal pain very similar to the pain she experienced with her previous ovarian cyst. She notes this pain has kept her bed ridden since its onset, and comes in waves. It sometimes shoots across her belly, and also has some low back pain due to this pain. Her pain is exacerbated by movement. She notes being nauseous and lightheaded. She has had no dysuria symptoms and no hematuria. The patient had her right ovary removed due to her previous ovarian cyst. She still gets periods, the last of which was in the beginning of the month. She denies vomiting.     Allergies:  Azithromycin  Erythromycin  Verapamil      Medications:    tofacitinib (XELJANZ) 5 MG tablet  Zonisamide (ZONEGRAN PO)  Cholecalciferol (VITAMIN D3) 3000 UNITS TABS  NAPROXEN PO  Zolmitriptan (ZOMIG PO)  pantoprazole (PROTONIX) 40 MG EC tablet  omalizumab (XOLAIR) 150 MG injection  meclizine (ANTIVERT) 25 MG tablet  cyclobenzaprine (FLEXERIL) 10 MG tablet  nortriptyline (PAMELOR) 50 MG capsule  ipratropium - albuterol 0.5 mg/2.5 mg/3 mL (DUONEB) 0.5-2.5 (3) MG/3ML nebulization  albuterol (2.5 MG/3ML) 0.083% nebulizer solution  Sertraline HCl (ZOLOFT PO)  fluticasone-salmeterol (ADVAIR) 500-50 MCG/DOSE diskus inhaler  fluticasone (FLONASE) 50 MCG/ACT nasal spray  Montelukast Sodium (SINGULAIR PO)    Past Medical History:    Anxiety  Arnold-Chiari malformation type 1  Depressive disorder  Fibromyalgia  Migraine  Rheumatoid arthritis  Asthma     Past Surgical History:    Gyn surgery  Ovary surgery     Family History:    History reviewed. No pertinent family history.     Social History:  Marital Status:   [2]  Social History   Substance Use Topics     Smoking status: Current Every Day  Smoker     Alcohol use No        Review of Systems   Gastrointestinal: Positive for abdominal pain and nausea. Negative for vomiting.   Genitourinary: Negative for dysuria.   Musculoskeletal: Positive for back pain.   Neurological: Positive for light-headedness.   All other systems reviewed and are negative.        Physical Exam   First Vitals:  BP: 130/55  Pulse: 79  Temp: 97.6  F (36.4  C)  Resp: 16  Weight: 74.8 kg (165 lb)  SpO2: 100 %      Physical Exam    Constitutional:  Pleasant, age appropriate female who appears in discomfort.  Eyes:    Conjunctiva normal  Neck:    Supple, no meningismus.     CV:     Regular rate and rhythm.      No murmurs, rubs or gallops.     No lower extremity edema.  PULM:    Clear to auscultation bilateral.       No respiratory distress.      Good air exchange.     No rales or wheezing.  ABD:    Soft, non-distended.       Moderate focal tenderness in the LLQ.     Bowel sounds normal.     No pulsatile masses.       No rebound, guarding or rigidity.     No CVA tenderness.   :    Normal external genitalia.     No perineal lesions.     No blood in the in the vaginal vault.     No vaginal discharge.     No cervical motion tenderness.     Left adnexal tenderness.     No adnexal mass.  MSK:     No gross deformity to all four extremities.   LYMPH:   No cervical lymphadenopathy.  NEURO:   Alert.  Good muscular tone, no atrophy.   Skin:    Warm, dry and intact.    Psych:    Mood is good and affect is appropriate.      Emergency Department Course   Imaging:  Radiology findings were communicated with the patient who voiced understanding of the findings.    US Pelvic Complete w Transvaginal & Abd/Pel Duplex Limited   Final Result   IMPRESSION: Retroverted uterus. Complex cyst left ovary. No ultrasound   evidence of ovarian torsion. Right ovary is surgically absent.      VERONICA FELIZ MD        Laboratory:  Laboratory findings were communicated with the patient who voiced understanding of the  findings.    CBC: WBC 9.4, HGB 12.5,   BMP: glucose 117 (H), Cl 110 (H), o/w WNL (Creatinine 0.83)    Interventions:  1401 Dilaudid 0.5 mg IV  1346 NS 1L IV Bolus   1402 Zofran 8 mg IV    Medications   HYDROmorphone (PF) (DILAUDID) injection 0.5 mg (0.5 mg Intravenous Given 3/15/18 1444)   ondansetron (ZOFRAN) injection 8 mg (8 mg Intravenous Given 3/15/18 1402)   0.9% sodium chloride BOLUS (1,000 mLs Intravenous New Bag 3/15/18 1346)     Emergency Department Course:  Nursing notes and vitals reviewed.  I performed an exam of the patient as documented above.   I discussed the treatment plan with the patient. They expressed understanding of this plan and consented to discharge. They will be discharged home with instructions for care and follow up. In addition, the patient will return to the emergency department if their symptoms persist, worsen, if new symptoms arise or if there is any concern.  All questions were answered.    I personally reviewed the imaging and lab results with the patient and answered all related questions prior to discharge.  Impression & Plan      Medical Decision Makin-year-old female presented to the ED with intermittent left lower quadrant abdominal pain and concern for recurrent ovarian cyst.  She has no evidence of urinary tract infection.  She is not pregnant.  She has no clinical findings for vaginitis, cervicitis or PID.  Urinalysis reveals no evidence of hematuria to cause increased concern for atypical ureteral colic.  Pelvic ultrasound reveals a complex left ovarian cyst without signs of ovarian torsion consistent with the patient's findings.  Patient improved in the ED.  She will be discharged home with analgesics and close follow-up with gynecology for further evaluation and management.    Diagnosis:    ICD-10-CM    1. Left ovarian cyst N83.202      Disposition:   Discharged    Discharge Medications:    Discharge Medication List as of 3/15/2018  3:58 PM      START  taking these medications    Details   ondansetron (ZOFRAN ODT) 4 MG ODT tab Take 1 tablet (4 mg) by mouth every 6 hours as needed for nausea, Disp-10 tablet, R-0, Local Print      oxyCODONE IR (ROXICODONE) 5 MG tablet Take 1-2 tablets (5-10 mg) by mouth every 4 hours as needed for pain, Disp-15 tablet, R-0, Local Print             Scribe Disclosure:  I, Beau Herrmann, am serving as a scribe at 2:18 PM on 3/15/2018 to document services personally performed by Edgar Owens MD, based on my observations and the provider's statements to me.     [facility] EMERGENCY DEPARTMENT       Edgar Owens MD  03/19/18 9859

## 2018-03-15 NOTE — ED AVS SNAPSHOT
[facility] Emergency Department    [address] [place] [address]    Phone:  [phone]    Fax:  [phone]                                       Holli Thacker   MRN: [id number]    Department:  [facility] Emergency Department   Date of Visit:  3/15/2018           Patient Information     Date Of Birth          1975        Your diagnoses for this visit were:     Left ovarian cyst        You were seen by Edgar Owens MD.        Discharge Instructions       Please make an appointment to follow up with [facility] [phone] in 3-5 days even if entirely better.    Discharge Instructions  Ovarian Cyst    Abdominal (belly) pain can be caused by many things. Your provider today has found that you have a cyst on the ovary. Women in their reproductive years form cysts every month, but only cause pain if they are very large, or if they rupture and release blood or fluid. Fortunately, they rarely require surgery or hospitalization. The pain from a ruptured cyst usually gets gradually better, and should be much better within a few days. If there is a large cyst, it will usually go away within 1-2 months, but needs to be watched to be sure it does go away, since sometimes a large cyst can become a cancer. There can be complications of a cyst, or other problems that cannot be found right away, so it is very important that you follow up as directed.      Generally, every Emergency Department visit should have a follow-up clinic visit with either a primary or a specialty clinic/provider. Please follow-up as instructed by your emergency provider today.    Return to the Emergency Department right away if:    Your pain becomes much worse or constant    You get an oral temperature above 100.4 F or as directed by your provider.    You have frequent vomiting    You faint, or feel very weak.    You have new symptoms or anything that worries you.    What can I  do to help myself?    Take any medication prescribed by your provider.    You may use Tylenol  (acetaminophen) or Advil , Motrin  (ibuprofen) for pain. Be sure to read and follow the package directions, and ask your provider if you have questions.    Avoid sex for several days, because it will probably be painful.    If you were given a prescription for medicine here today, be sure to read all of the information (including the package insert) that comes with your prescription.  This will include important information about the medicine, its side effects, and any warnings that you need to know about.  The pharmacist who fills the prescription can provide more information and answer questions you may have about the medicine.  If you have questions or concerns that the pharmacist cannot address, please call or return to the Emergency Department.     Remember that you can always come back to the Emergency Department if you are not able to see your regular provider in the amount of time listed above, if you get any new symptoms, or if there is anything that worries you.        Future Appointments        Provider Department Dept Phone Center    4/5/2018 11:00 AM  Infusion Chair [address] [place] [address] Infusion Services [phone] [facility]      24 Hour Appointment Hotline       To make an appointment at any [facility], call [phone] ([phone]). If you don't have a family doctor or clinic, we will help you find one. [facility] are conveniently located to serve the needs of you and your family.             Review of your medicines      START taking        Dose / Directions Last dose taken    ondansetron 4 MG ODT tab   Commonly known as:  ZOFRAN ODT   Dose:  4 mg   Quantity:  10 tablet        Take 1 tablet (4 mg) by mouth every 6 hours as needed for nausea   Refills:  0        oxyCODONE IR 5 MG tablet   Commonly known as:  ROXICODONE   Dose:  5-10 mg   Quantity:  15 tablet        Take  1-2 tablets (5-10 mg) by mouth every 4 hours as needed for pain   Refills:  0          Our records show that you are taking the medicines listed below. If these are incorrect, please call your family doctor or clinic.        Dose / Directions Last dose taken    albuterol (2.5 MG/3ML) 0.083% neb solution   Dose:  1 vial   Quantity:  1 Box        Take 1 vial (2.5 mg) by nebulization every 4 hours as needed for shortness of breath / dyspnea or wheezing   Refills:  0        cyclobenzaprine 10 MG tablet   Commonly known as:  FLEXERIL   Dose:  10 mg        Take 10 mg by mouth   Refills:  0        fluticasone 50 MCG/ACT spray   Commonly known as:  FLONASE        Spray into both nostrils daily   Refills:  0        fluticasone-salmeterol 500-50 MCG/DOSE diskus inhaler   Commonly known as:  ADVAIR   Dose:  1 puff        Inhale 1 puff into the lungs 2 times daily   Refills:  0        ipratropium - albuterol 0.5 mg/2.5 mg/3 mL 0.5-2.5 (3) MG/3ML neb solution   Commonly known as:  DUONEB   Dose:  1 vial   Quantity:  1 Box        Take 1 vial (3 mLs) by nebulization 3 times daily   Refills:  3        meclizine 25 MG tablet   Commonly known as:  ANTIVERT   Dose:  25 mg        Take 25 mg by mouth   Refills:  0        NAPROXEN PO   Dose:  500 mg        Take 500 mg by mouth 2 times daily as needed for moderate pain   Refills:  0        nortriptyline 50 MG capsule   Commonly known as:  PAMELOR        Take on capsule at betime   Refills:  0        omalizumab 150 MG injection   Commonly known as:  XOLAIR   Dose:  150 mg        Inject 150 mg Subcutaneous See Admin Instructions   Refills:  0        pantoprazole 40 MG EC tablet   Commonly known as:  PROTONIX   Dose:  40 mg        Take 40 mg by mouth   Refills:  0        SINGULAIR PO   Dose:  10 mg        Take 10 mg by mouth   Refills:  0        Vitamin D3 3000 UNITS Tabs   Dose:  5000 Units        Take 5,000 Units by mouth daily   Refills:  0        XELJANZ 5 MG tablet   Dose:  5 mg    Generic drug:  tofacitinib        Take 5 mg by mouth 2 times daily   Refills:  0        ZOLOFT PO   Dose:  200 mg        Take 200 mg by mouth daily   Refills:  0        ZOMIG PO   Dose:  5 mg        Take 5 mg by mouth at onset of headache for migraine   Refills:  0        ZONEGRAN PO   Dose:  200 mg        Take 200 mg by mouth daily   Refills:  0                Prescriptions were sent or printed at these locations (2 Prescriptions)                   Other Prescriptions                Printed at Department/Unit printer (2 of 2)         ondansetron (ZOFRAN ODT) 4 MG ODT tab               oxyCODONE IR (ROXICODONE) 5 MG tablet                Procedures and tests performed during your visit     Basic metabolic panel (BMP)    CBC (platelets, no diff)    HCG qualitative urine    Peripheral IV catheter    UA with Microscopic    US Pelvic Complete w Transvaginal & Abd/Pel Duplex Limited      Orders Needing Specimen Collection     None      Pending Results     No orders found from 3/13/2018 to 3/16/2018.            Pending Culture Results     No orders found from 3/13/2018 to 3/16/2018.            Pending Results Instructions     If you had any lab results that were not finalized at the time of your Discharge, you can call the ED Lab Result RN at [phone]. You will be contacted by this team for any positive Lab results or changes in treatment. The nurses are available 7 days a week from 10A to 6:30P.  You can leave a message 24 hours per day and they will return your call.        Test Results From Your Hospital Stay        3/15/2018  3:10 PM      Component Results     Component Value Ref Range & Units Status    Color Urine Straw  Final    Appearance Urine Clear  Final    Glucose Urine Negative NEG^Negative mg/dL Final    Bilirubin Urine Negative NEG^Negative Final    Ketones Urine Negative NEG^Negative mg/dL Final    Specific Gravity Urine 1.002 (L) 1.003 - 1.035 Final    Blood Urine Negative NEG^Negative Final    pH  Urine 7.0 5.0 - 7.0 pH Final    Protein Albumin Urine Negative NEG^Negative mg/dL Final    Urobilinogen mg/dL 0.0 0.0 - 2.0 mg/dL Final    Nitrite Urine Negative NEG^Negative Final    Leukocyte Esterase Urine Negative NEG^Negative Final    Source Midstream Urine  Final    WBC Urine <1 0 - 5 /HPF Final    RBC Urine <1 0 - 2 /HPF Final    Bacteria Urine Few (A) NEG^Negative /HPF Final    Squamous Epithelial /HPF Urine 3 (H) 0 - 1 /HPF Final         3/15/2018  3:27 PM      Component Results     Component Value Ref Range & Units Status    HCG Qual Urine Negative NEG^Negative Final    This test is for screening purposes.  Results should be interpreted along with   the clinical picture.  Confirmation testing is available if warranted by   ordering [id number], HCG Quantitative Pregnancy.           3/15/2018  1:56 PM      Component Results     Component Value Ref Range & Units Status    WBC 9.4 4.0 - 11.0 10e9/L Final    RBC Count 4.15 3.8 - 5.2 10e12/L Final    Hemoglobin 12.5 11.7 - 15.7 g/dL Final    Hematocrit 38.6 35.0 - 47.0 % Final    MCV 93 78 - 100 fl Final    MCH 30.1 26.5 - 33.0 pg Final    MCHC 32.4 31.5 - 36.5 g/dL Final    RDW 14.0 10.0 - 15.0 % Final    Platelet Count 328 150 - 450 10e9/L Final         3/15/2018  2:09 PM      Component Results     Component Value Ref Range & Units Status    Sodium 140 133 - 144 mmol/L Final    Potassium 3.7 3.4 - 5.3 mmol/L Final    Chloride 110 (H) 94 - 109 mmol/L Final    Carbon Dioxide 24 20 - 32 mmol/L Final    Anion Gap 6 3 - 14 mmol/L Final    Glucose 117 (H) 70 - 99 mg/dL Final    Urea Nitrogen 11 7 - 30 mg/dL Final    Creatinine 0.83 0.52 - 1.04 mg/dL Final    GFR Estimate 75 >60 mL/min/1.7m2 Final    Non  GFR Calc    GFR Estimate If Black >90 >60 mL/min/1.7m2 Final    African American GFR Calc    Calcium 8.5 8.5 - 10.1 mg/dL Final         3/15/2018  3:15 PM      Narrative     ULTRASOUND PELVIS COMPLETE W TRANSVAGINAL AND DOPPLER LIMITED March  15, 2018  2:32 PM     HISTORY: Left lower quadrant pain.    FINDINGS: Transvaginal images were performed to better evaluate the  patient's uterus, ovaries and endometrial stripe.    The uterus is retroverted in position but normal in size measuring 7.9  x 6.0 x 4.6 cm. No fibroids are evident. Endometrial stripe measures  10 mm and is normal for patient's age. The right ovary is surgically  absent. The left ovary measures 4.9 x 4.3 x 2.1 cm and contains two  complex appearing cystic lesions measuring 2.7 and 1.8 cm. No abnormal  color Doppler flow. Physiologic or hemorrhagic cysts are possible.  Color Doppler waveform analysis demonstrates normal arterial and  venous waveforms in the left ovary. No adnexal masses are present. A  trace amount of probable physiologic free pelvic fluid is present.        Impression     IMPRESSION: Retroverted uterus. Complex cyst left ovary. No ultrasound  evidence of ovarian torsion. Right ovary is surgically absent.    VERONICA FELIZ MD                Clinical Quality Measure: Blood Pressure Screening     Your blood pressure was checked while you were in the emergency department today. The last reading we obtained was  BP: 105/57 . Please read the guidelines below about what these numbers mean and what you should do about them.  If your systolic blood pressure (the top number) is less than 120 and your diastolic blood pressure (the bottom number) is less than 80, then your blood pressure is normal. There is nothing more that you need to do about it.  If your systolic blood pressure (the top number) is 120-139 or your diastolic blood pressure (the bottom number) is 80-89, your blood pressure may be higher than it should be. You should have your blood pressure rechecked within a year by a primary care provider.  If your systolic blood pressure (the top number) is 140 or greater or your diastolic blood pressure (the bottom number) is 90 or greater, you may have high blood pressure. High blood  pressure is treatable, but if left untreated over time it can put you at risk for heart attack, stroke, or kidney failure. You should have your blood pressure rechecked by a primary care provider within the next 4 weeks.  If your provider in the emergency department today gave you specific instructions to follow-up with your doctor or provider even sooner than that, you should follow that instruction and not wait for up to 4 weeks for your follow-up visit.        Thank you for choosing [facility]       Thank you for choosing [facility] for your care. Our goal is always to provide you with excellent care. Hearing back from our patients is one way we can continue to improve our services. Please take a few minutes to complete the written survey that you may receive in the mail after you visit with us. Thank you!        New [employer] Information     [employer] gives you secure access to your electronic health record. If you see a primary care provider, you can also send messages to your care team and make appointments. If you have questions, please call your primary care clinic.  If you do not have a primary care provider, please call [phone] and they will assist you.        Care EveryWhere ID     This is your Care EveryWhere ID. This could be used by other organizations to access your [facility] medical records  [id number]        Equal Access to Services     SHANTA KAT : Hadii candice López, terence trammell, maxx schmidt, anaid nunes. So [facility] [place] [phone].    ATENCIÓN: Si habla español, tiene a trinidad disposición servicios gratuitos de asistencia lingüística. Llame al [phone].    We comply with applicable federal civil rights laws and [place] laws. We do not discriminate on the basis of race, color, national origin, age, disability, sex, sexual orientation, or gender identity.            After Visit Summary       This is your record. Keep this with you and show to your  community pharmacist(s) and doctor(s) at your next visit.

## 2018-03-16 ENCOUNTER — APPOINTMENT (OUTPATIENT)
Dept: ULTRASOUND IMAGING | Facility: CLINIC | Age: 43
End: 2018-03-16
Attending: EMERGENCY MEDICINE
Payer: COMMERCIAL

## 2018-03-16 ENCOUNTER — HOSPITAL ENCOUNTER (OUTPATIENT)
Facility: CLINIC | Age: 43
Setting detail: OBSERVATION
Discharge: HOME | End: 2018-03-17
Attending: EMERGENCY MEDICINE | Admitting: OBSTETRICS & GYNECOLOGY
Payer: COMMERCIAL

## 2018-03-16 DIAGNOSIS — G89.18 POST-OP PAIN: Primary | ICD-10-CM

## 2018-03-16 DIAGNOSIS — R10.2 SUPRAPUBIC ABDOMINAL PAIN: ICD-10-CM

## 2018-03-16 DIAGNOSIS — N83.292 COMPLEX CYST OF LEFT OVARY: ICD-10-CM

## 2018-03-16 LAB
ALBUMIN SERPL-MCNC: 3.6 G/DL (ref 3.4–5)
ALBUMIN UR-MCNC: NEGATIVE MG/DL
ALP SERPL-CCNC: 103 U/L (ref 40–150)
ALT SERPL W P-5'-P-CCNC: 17 U/L (ref 0–50)
ANION GAP SERPL CALCULATED.3IONS-SCNC: 5 MMOL/L (ref 3–14)
APPEARANCE UR: ABNORMAL
AST SERPL W P-5'-P-CCNC: 15 U/L (ref 0–45)
BASOPHILS # BLD AUTO: 0 10E9/L (ref 0–0.2)
BASOPHILS NFR BLD AUTO: 0.3 %
BILIRUB SERPL-MCNC: 0.3 MG/DL (ref 0.2–1.3)
BILIRUB UR QL STRIP: NEGATIVE
BUN SERPL-MCNC: 11 MG/DL (ref 7–30)
CALCIUM SERPL-MCNC: 8.4 MG/DL (ref 8.5–10.1)
CHLORIDE SERPL-SCNC: 109 MMOL/L (ref 94–109)
CO2 SERPL-SCNC: 25 MMOL/L (ref 20–32)
COLOR UR AUTO: YELLOW
CREAT SERPL-MCNC: 0.75 MG/DL (ref 0.52–1.04)
DIFFERENTIAL METHOD BLD: ABNORMAL
EOSINOPHIL # BLD AUTO: 0.3 10E9/L (ref 0–0.7)
EOSINOPHIL NFR BLD AUTO: 2.5 %
ERYTHROCYTE [DISTWIDTH] IN BLOOD BY AUTOMATED COUNT: 13.9 % (ref 10–15)
GFR SERPL CREATININE-BSD FRML MDRD: 85 ML/MIN/1.7M2
GLUCOSE SERPL-MCNC: 121 MG/DL (ref 70–99)
GLUCOSE UR STRIP-MCNC: NEGATIVE MG/DL
HCG UR QL: NEGATIVE
HCT VFR BLD AUTO: 43.9 % (ref 35–47)
HGB BLD-MCNC: 13.9 G/DL (ref 11.7–15.7)
HGB UR QL STRIP: NEGATIVE
IMM GRANULOCYTES # BLD: 0 10E9/L (ref 0–0.4)
IMM GRANULOCYTES NFR BLD: 0.4 %
KETONES UR STRIP-MCNC: NEGATIVE MG/DL
LEUKOCYTE ESTERASE UR QL STRIP: NEGATIVE
LYMPHOCYTES # BLD AUTO: 0.4 10E9/L (ref 0.8–5.3)
LYMPHOCYTES NFR BLD AUTO: 4 %
MCH RBC QN AUTO: 29.4 PG (ref 26.5–33)
MCHC RBC AUTO-ENTMCNC: 31.7 G/DL (ref 31.5–36.5)
MCV RBC AUTO: 93 FL (ref 78–100)
MONOCYTES # BLD AUTO: 0.4 10E9/L (ref 0–1.3)
MONOCYTES NFR BLD AUTO: 3.8 %
MUCOUS THREADS #/AREA URNS LPF: PRESENT /LPF
NEUTROPHILS # BLD AUTO: 9.8 10E9/L (ref 1.6–8.3)
NEUTROPHILS NFR BLD AUTO: 89 %
NITRATE UR QL: NEGATIVE
NRBC # BLD AUTO: 0 10*3/UL
NRBC BLD AUTO-RTO: 0 /100
PH UR STRIP: 5 PH (ref 5–7)
PLATELET # BLD AUTO: 355 10E9/L (ref 150–450)
POTASSIUM SERPL-SCNC: 4.2 MMOL/L (ref 3.4–5.3)
PROT SERPL-MCNC: 7.3 G/DL (ref 6.8–8.8)
RBC # BLD AUTO: 4.73 10E12/L (ref 3.8–5.2)
RBC #/AREA URNS AUTO: <1 /HPF (ref 0–2)
SODIUM SERPL-SCNC: 139 MMOL/L (ref 133–144)
SOURCE: ABNORMAL
SP GR UR STRIP: 1.02 (ref 1–1.03)
SQUAMOUS #/AREA URNS AUTO: 6 /HPF (ref 0–1)
UROBILINOGEN UR STRIP-MCNC: 2 MG/DL (ref 0–2)
WBC # BLD AUTO: 11 10E9/L (ref 4–11)
WBC #/AREA URNS AUTO: <1 /HPF (ref 0–5)

## 2018-03-16 PROCEDURE — 96376 TX/PRO/DX INJ SAME DRUG ADON: CPT

## 2018-03-16 PROCEDURE — 81025 URINE PREGNANCY TEST: CPT | Performed by: EMERGENCY MEDICINE

## 2018-03-16 PROCEDURE — 25000128 H RX IP 250 OP 636: Performed by: EMERGENCY MEDICINE

## 2018-03-16 PROCEDURE — 80053 COMPREHEN METABOLIC PANEL: CPT | Performed by: EMERGENCY MEDICINE

## 2018-03-16 PROCEDURE — 81001 URINALYSIS AUTO W/SCOPE: CPT | Performed by: EMERGENCY MEDICINE

## 2018-03-16 PROCEDURE — 96374 THER/PROPH/DIAG INJ IV PUSH: CPT

## 2018-03-16 PROCEDURE — 99285 EMERGENCY DEPT VISIT HI MDM: CPT | Mod: 25

## 2018-03-16 PROCEDURE — 96375 TX/PRO/DX INJ NEW DRUG ADDON: CPT

## 2018-03-16 PROCEDURE — 85025 COMPLETE CBC W/AUTO DIFF WBC: CPT | Performed by: EMERGENCY MEDICINE

## 2018-03-16 PROCEDURE — 76830 TRANSVAGINAL US NON-OB: CPT

## 2018-03-16 RX ORDER — HYDROMORPHONE HYDROCHLORIDE 1 MG/ML
0.5 INJECTION, SOLUTION INTRAMUSCULAR; INTRAVENOUS; SUBCUTANEOUS
Status: COMPLETED | OUTPATIENT
Start: 2018-03-16 | End: 2018-03-16

## 2018-03-16 RX ORDER — ONDANSETRON 2 MG/ML
4 INJECTION INTRAMUSCULAR; INTRAVENOUS ONCE
Status: COMPLETED | OUTPATIENT
Start: 2018-03-16 | End: 2018-03-16

## 2018-03-16 RX ADMIN — Medication 0.5 MG: at 20:47

## 2018-03-16 RX ADMIN — ONDANSETRON 4 MG: 2 INJECTION INTRAMUSCULAR; INTRAVENOUS at 20:47

## 2018-03-16 RX ADMIN — Medication 0.5 MG: at 23:36

## 2018-03-16 RX ADMIN — Medication 0.5 MG: at 22:44

## 2018-03-16 ASSESSMENT — ENCOUNTER SYMPTOMS
NAUSEA: 1
DIAPHORESIS: 0
DYSURIA: 0
VOMITING: 1
APPETITE CHANGE: 1
BACK PAIN: 0
FEVER: 0
HEMATURIA: 0
DIARRHEA: 0
FLANK PAIN: 0
CONSTIPATION: 0
ABDOMINAL PAIN: 1
CHILLS: 0

## 2018-03-16 NOTE — IP AVS SNAPSHOT
MRN:[id number]                      After Visit Summary   3/16/2018    Holli Thacker    MRN: [id number]           Thank you!     Thank you for choosing [facility] for your care. Our goal is always to provide you with excellent care. Hearing back from our patients is one way we can continue to improve our services. Please take a few minutes to complete the written survey that you may receive in the mail after you visit. If you would like to speak to someone directly about your visit please contact Patient Relations at [phone]. Thank you!          Patient Information     Date Of Birth          1975        About your hospital stay     You were admitted on:  March 17, 2018 You last received care in the:  [facility] [place] [facility]    You were discharged on:  March 17, 2018       Who to Call     For medical emergencies, please call 911.  For non-urgent questions about your medical care, please call your primary care provider or clinic, [phone]  For questions related to your surgery, please call your surgery clinic        Attending Provider     Provider Specialty    Rolando Canales MD Emergency Medicine    Juice Gold MD OB/Gyn       Primary Care Provider Office Phone # Fax #    Hansa Boles [phone]      After Care Instructions     Discharge Instructions - diet       Resume pre procedure diet.            NO ALCOHOL        For 24 hours post procedure.            NO driving or operating machinery        until the day after the procedure.            NO lifting       NO lifting over  20  lbs and no strenuous physical activity for  2  weeks.                  Your next 10 appointments already scheduled     Apr 05, 2018 11:00 AM CDT   Level 1 with  INFUSION CHAIR 11   [facility] ([facility])    [facility] [place]  [address]  [facility] [address]   [phone]     "          Pending Results     Date and Time Order Name Status Description    3/17/2018 0207 Surgical pathology exam In process             Admission Information     Date & Time Provider Department Dept. Phone    3/16/2018 Juice Gold MD [facility] [phone]      Your Vitals Were     Blood Pressure Pulse Temperature Respirations Height Last Period    103/56 (BP Location: Right arm) 65 97.6  F (36.4  C) (Oral) 16 1.651 m (5' 5\") 03/01/2018 (Approximate)    Pulse Oximetry                   98%           MyChart Information     [email or web address] gives you secure access to your electronic health record. If you see a primary care provider, you can also send messages to your care team and make appointments. If you have questions, please call your primary care clinic.  If you do not have a primary care provider, please call [phone] and they will assist you.        Care EveryWhere ID     This is your Care EveryWhere ID. This could be used by other organizations to access your [place] medical records  [id number]        Equal Access to Services     SHANTA KAT : Hadii aad ku hadasho Sofrankie, waaxda luqadaha, qaybta kaalmada ademateus, anaid reyes . So [facility] [phone].    ATENCIÓN: Si habla español, tiene a trinidad disposición servicios gratuitos de asistencia lingüística. Llame al [phone].    We comply with applicable federal civil rights laws and [place] laws. We do not discriminate on the basis of race, color, national origin, age, disability, sex, sexual orientation, or gender identity.               Review of your medicines      START taking        Dose / Directions    ibuprofen 200 MG tablet   Commonly known as:  ADVIL/MOTRIN        Dose:  200-400 mg   Take 1-2 tablets (200-400 mg) by mouth every 6 hours as needed for other (mild pain)   Quantity:  30 tablet   Refills:  0         CONTINUE these medicines which may have CHANGED, or have new prescriptions. If we " are uncertain of the size of tablets/capsules you have at home, strength may be listed as something that might have changed.        Dose / Directions    * oxyCODONE IR 5 MG tablet   Commonly known as:  ROXICODONE   This may have changed:  Another medication with the same name was added. Make sure you understand how and when to take each.        Dose:  5-10 mg   Take 1-2 tablets (5-10 mg) by mouth every 4 hours as needed for pain   Quantity:  15 tablet   Refills:  0       * oxyCODONE IR 5 MG tablet   Commonly known as:  ROXICODONE   This may have changed:  You were already taking a medication with the same name, and this prescription was added. Make sure you understand how and when to take each.        Dose:  5-10 mg   Take 1-2 tablets (5-10 mg) by mouth every 3 hours as needed for other (Moderate to Severe Pain)   Quantity:  10 tablet   Refills:  0       * Notice:  This list has 2 medication(s) that are the same as other medications prescribed for you. Read the directions carefully, and ask your doctor or other care provider to review them with you.      CONTINUE these medicines which have NOT CHANGED        Dose / Directions    albuterol (2.5 MG/3ML) 0.083% neb solution        Dose:  1 vial   Take 1 vial (2.5 mg) by nebulization every 4 hours as needed for shortness of breath / dyspnea or wheezing   Quantity:  1 Box   Refills:  0       cyclobenzaprine 10 MG tablet   Commonly known as:  FLEXERIL        Dose:  10 mg   Take 10 mg by mouth At Bedtime   Refills:  0       fluticasone 50 MCG/ACT spray   Commonly known as:  FLONASE        Dose:  1 spray   Spray 1 spray into both nostrils daily   Refills:  0       fluticasone-salmeterol 500-50 MCG/DOSE diskus inhaler   Commonly known as:  ADVAIR        Dose:  1 puff   Inhale 1 puff into the lungs 2 times daily   Refills:  0       ipratropium - albuterol 0.5 mg/2.5 mg/3 mL 0.5-2.5 (3) MG/3ML neb solution   Commonly known as:  DUONEB        Dose:  1 vial   Take 1 vial (3 mLs)  by nebulization 3 times daily   Quantity:  1 Box   Refills:  3       nortriptyline 50 MG capsule   Commonly known as:  PAMELOR        Take on capsule at betime   Refills:  0       omalizumab 150 MG injection   Commonly known as:  XOLAIR        Dose:  150 mg   Inject 150 mg Subcutaneous See Admin Instructions   Refills:  0       ondansetron 4 MG ODT tab   Commonly known as:  ZOFRAN ODT        Dose:  4 mg   Take 1 tablet (4 mg) by mouth every 6 hours as needed for nausea   Quantity:  10 tablet   Refills:  0       pantoprazole 40 MG EC tablet   Commonly known as:  PROTONIX        Dose:  40 mg   Take 40 mg by mouth every morning   Refills:  0       SINGULAIR PO        Dose:  10 mg   Take 10 mg by mouth At Bedtime   Refills:  0       Vitamin D3 3000 UNITS Tabs        Dose:  5000 Units   Take 5,000 Units by mouth daily   Refills:  0       XELJANZ 5 MG tablet   Generic drug:  tofacitinib        Dose:  5 mg   Take 5 mg by mouth 2 times daily   Refills:  0       ZOLOFT PO        Dose:  200 mg   Take 200 mg by mouth At Bedtime   Refills:  0       ZOMIG PO        Dose:  5 mg   Take 5 mg by mouth at onset of headache for migraine   Refills:  0       ZONEGRAN PO        Dose:  200 mg   Take 200 mg by mouth At Bedtime   Refills:  0            Where to get your medicines      Some of these will need a paper prescription and others can be bought over the counter. Ask your nurse if you have questions.     Bring a paper prescription for each of these medications     oxyCODONE IR 5 MG tablet       You don't need a prescription for these medications     ibuprofen 200 MG tablet                Protect others around you: Learn how to safely use, store and throw away your medicines at www.disposemymeds.org.        Information about OPIOIDS     PRESCRIPTION OPIOIDS: WHAT YOU NEED TO KNOW    Prescription opioids can be used to help relieve moderate to severe pain and are often prescribed following a surgery or injury, or for certain health  conditions. These medications can be an important part of treatment but also come with serious risks. It is important to work with your health care provider to make sure you are getting the safest, most effective care.    WHAT ARE THE RISKS AND SIDE EFFECTS OF OPIOID USE?  Prescription opioids carry serious risks of addiction and overdose, especially with prolonged use. An opioid overdose, often marked by slowed breathing can cause sudden death. The use of prescription opioids can have a number of side effects as well, even when taken as directed:      Tolerance - meaning you might need to take more of a medication for the same pain relief    Physical dependence - meaning you have symptoms of withdrawal when a medication is stopped    Increased sensitivity to pain    Constipation    Nausea, vomiting, and dry mouth    Sleepiness and dizziness    Confusion    Depression    Low levels of testosterone that can result in lower sex drive, energy, and strength    Itching and sweating    RISKS ARE GREATER WITH:    History of drug misuse, substance use disorder, or overdose    Mental health conditions (such as depression or anxiety)    Sleep apnea    Older age (65 years or older)    Pregnancy    Avoid alcohol while taking prescription opioids.   Also, unless specifically advised by your health care provider, medications to avoid include:    Benzodiazepines (such as Xanax or Valium)    Muscle relaxants (such as Soma or Flexeril)    Hypnotics (such as Ambien or Lunesta)    Other prescription opioids    KNOW YOUR OPTIONS:  Talk to your health care provider about ways to manage your pain that do not involve prescription opioids. Some of these options may actually work better and have fewer risks and side effects:    Pain relievers such as acetaminophen, ibuprofen, and naproxen    Some medications that are also used for depression or seizures    Physical therapy and exercise    Cognitive behavioral therapy, a psychological,  goal-directed approach, in which patients learn how to modify physical, behavioral, and emotional triggers of pain and stress    IF YOU ARE PRESCRIBED OPIOIDS FOR PAIN:    Never take opioids in greater amounts or more often than prescribed    Follow up with your primary health care provider and work together to create a plan on how to manage your pain.    Talk about ways to help manage your pain that do not involve prescription opioids    Talk about all concerns and side effects    Help prevent misuse and abuse    Never sell or share prescription opioids    Never use another person's prescription opioids    Store prescription opioids in a secure place and out of reach of others (this may include visitors, children, friends, and family)    Visit www.cdc.gov/drugoverdose to learn about risks of opioid abuse and overdose    If you believe you may be struggling with addiction, tell your health care provider and ask for guidance or call [facility]'s National Helpline at 3-016-128-HELP    LEARN MORE / www.cdc.gov/drugoverdose/prescribing/guideline.html    Safely dispose of unused prescription opioids: Find your local drug take-back programs and more information about the importance of safe disposal at www.doseofreality.[place].gov             Medication List: This is a list of all your medications and when to take them. Check marks below indicate your daily home schedule. Keep this list as a reference.      Medications           Morning Afternoon Evening Bedtime As Needed    albuterol (2.5 MG/3ML) 0.083% neb solution   Take 1 vial (2.5 mg) by nebulization every 4 hours as needed for shortness of breath / dyspnea or wheezing                                cyclobenzaprine 10 MG tablet   Commonly known as:  FLEXERIL   Take 10 mg by mouth At Bedtime                                fluticasone 50 MCG/ACT spray   Commonly known as:  FLONASE   Spray 1 spray into both nostrils daily                                fluticasone-salmeterol  500-50 MCG/DOSE diskus inhaler   Commonly known as:  ADVAIR   Inhale 1 puff into the lungs 2 times daily                                ibuprofen 200 MG tablet   Commonly known as:  ADVIL/MOTRIN   Take 1-2 tablets (200-400 mg) by mouth every 6 hours as needed for other (mild pain)   Last time this was given:  600 mg on 3/17/2018 12:36 PM                                ipratropium - albuterol 0.5 mg/2.5 mg/3 mL 0.5-2.5 (3) MG/3ML neb solution   Commonly known as:  DUONEB   Take 1 vial (3 mLs) by nebulization 3 times daily                                nortriptyline 50 MG capsule   Commonly known as:  PAMELOR   Take on capsule at betime                                omalizumab 150 MG injection   Commonly known as:  XOLAIR   Inject 150 mg Subcutaneous See Admin Instructions                                ondansetron 4 MG ODT tab   Commonly known as:  ZOFRAN ODT   Take 1 tablet (4 mg) by mouth every 6 hours as needed for nausea                                * oxyCODONE IR 5 MG tablet   Commonly known as:  ROXICODONE   Take 1-2 tablets (5-10 mg) by mouth every 4 hours as needed for pain   Last time this was given:  5 mg on 3/17/2018 12:36 PM                                * oxyCODONE IR 5 MG tablet   Commonly known as:  ROXICODONE   Take 1-2 tablets (5-10 mg) by mouth every 3 hours as needed for other (Moderate to Severe Pain)   Last time this was given:  5 mg on 3/17/2018 12:36 PM                                pantoprazole 40 MG EC tablet   Commonly known as:  PROTONIX   Take 40 mg by mouth every morning                                SINGULAIR PO   Take 10 mg by mouth At Bedtime                                Vitamin D3 3000 UNITS Tabs   Take 5,000 Units by mouth daily                                XELJANZ 5 MG tablet   Take 5 mg by mouth 2 times daily   Generic drug:  tofacitinib                                ZOLOFT PO   Take 200 mg by mouth At Bedtime                                ZOMIG PO   Take 5 mg by mouth  at onset of headache for migraine                                ZONEGRAN PO   Take 200 mg by mouth At Bedtime                                * Notice:  This list has 2 medication(s) that are the same as other medications prescribed for you. Read the directions carefully, and ask your doctor or other care provider to review them with you.

## 2018-03-16 NOTE — LETTER
March 17, 2018      To Whom It May Concern:      Holli Thacker's  was present in our Emergency Department today helping to take care of his wife, 03/16/18-3/17/18.  I expect her condition to improve over the next 2 days.  Her  should be able to return to work 3/18/2018 safely.        Sincerely,        Rolando Canales MD

## 2018-03-16 NOTE — IP AVS SNAPSHOT
[facility]    [address]    [facility] [address]    Phone:  [phone]    Fax:  [phone]                                       After Visit Summary   3/16/2018    Holli Thacker    MRN: [id number]           After Visit Summary Signature Page     I have received my discharge instructions, and my questions have been answered. I have discussed any challenges I see with this plan with the nurse or doctor.    ..........................................................................................................................................  Patient/Patient Representative Signature      ..........................................................................................................................................  Patient Representative Print Name and Relationship to Patient    ..................................................               ................................................  Date                                            Time    ..........................................................................................................................................  Reviewed by Signature/Title    ...................................................              ..............................................  Date                                                            Time

## 2018-03-17 ENCOUNTER — ANESTHESIA (OUTPATIENT)
Dept: SURGERY | Facility: CLINIC | Age: 43
End: 2018-03-17
Payer: COMMERCIAL

## 2018-03-17 ENCOUNTER — ANESTHESIA EVENT (OUTPATIENT)
Dept: SURGERY | Facility: CLINIC | Age: 43
End: 2018-03-17
Payer: COMMERCIAL

## 2018-03-17 VITALS
OXYGEN SATURATION: 97 % | HEIGHT: 65 IN | SYSTOLIC BLOOD PRESSURE: 103 MMHG | RESPIRATION RATE: 16 BRPM | HEART RATE: 65 BPM | DIASTOLIC BLOOD PRESSURE: 56 MMHG | TEMPERATURE: 97.6 F

## 2018-03-17 PROBLEM — G89.18 POST-OP PAIN: Status: ACTIVE | Noted: 2018-03-17

## 2018-03-17 LAB
ABO + RH BLD: NORMAL
ABO + RH BLD: NORMAL
BLD GP AB SCN SERPL QL: NORMAL
BLOOD BANK CMNT PATIENT-IMP: NORMAL
GLUCOSE BLDC GLUCOMTR-MCNC: 99 MG/DL (ref 70–99)
SPECIMEN EXP DATE BLD: NORMAL

## 2018-03-17 PROCEDURE — 25000128 H RX IP 250 OP 636: Performed by: ANESTHESIOLOGY

## 2018-03-17 PROCEDURE — 86850 RBC ANTIBODY SCREEN: CPT | Performed by: OBSTETRICS & GYNECOLOGY

## 2018-03-17 PROCEDURE — 25000566 ZZH SEVOFLURANE, EA 15 MIN: Performed by: OBSTETRICS & GYNECOLOGY

## 2018-03-17 PROCEDURE — 88305 TISSUE EXAM BY PATHOLOGIST: CPT | Mod: 26 | Performed by: OBSTETRICS & GYNECOLOGY

## 2018-03-17 PROCEDURE — 36415 COLL VENOUS BLD VENIPUNCTURE: CPT | Performed by: OBSTETRICS & GYNECOLOGY

## 2018-03-17 PROCEDURE — 25000125 ZZHC RX 250: Performed by: NURSE ANESTHETIST, CERTIFIED REGISTERED

## 2018-03-17 PROCEDURE — 25000128 H RX IP 250 OP 636: Performed by: OBSTETRICS & GYNECOLOGY

## 2018-03-17 PROCEDURE — 86901 BLOOD TYPING SEROLOGIC RH(D): CPT | Performed by: OBSTETRICS & GYNECOLOGY

## 2018-03-17 PROCEDURE — 25000125 ZZHC RX 250: Performed by: OBSTETRICS & GYNECOLOGY

## 2018-03-17 PROCEDURE — G0378 HOSPITAL OBSERVATION PER HR: HCPCS

## 2018-03-17 PROCEDURE — 25000128 H RX IP 250 OP 636: Performed by: NURSE ANESTHETIST, CERTIFIED REGISTERED

## 2018-03-17 PROCEDURE — 36000056 ZZH SURGERY LEVEL 3 1ST 30 MIN: Performed by: OBSTETRICS & GYNECOLOGY

## 2018-03-17 PROCEDURE — 27210794 ZZH OR GENERAL SUPPLY STERILE: Performed by: OBSTETRICS & GYNECOLOGY

## 2018-03-17 PROCEDURE — 25800025 ZZH RX 258: Performed by: OBSTETRICS & GYNECOLOGY

## 2018-03-17 PROCEDURE — 86900 BLOOD TYPING SEROLOGIC ABO: CPT | Performed by: OBSTETRICS & GYNECOLOGY

## 2018-03-17 PROCEDURE — 71000012 ZZH RECOVERY PHASE 1 LEVEL 1 FIRST HR: Performed by: OBSTETRICS & GYNECOLOGY

## 2018-03-17 PROCEDURE — 37000009 ZZH ANESTHESIA TECHNICAL FEE, EACH ADDTL 15 MIN: Performed by: OBSTETRICS & GYNECOLOGY

## 2018-03-17 PROCEDURE — 25000132 ZZH RX MED GY IP 250 OP 250 PS 637: Performed by: OBSTETRICS & GYNECOLOGY

## 2018-03-17 PROCEDURE — 00000146 ZZHCL STATISTIC GLUCOSE BY METER IP

## 2018-03-17 PROCEDURE — 88305 TISSUE EXAM BY PATHOLOGIST: CPT | Performed by: OBSTETRICS & GYNECOLOGY

## 2018-03-17 PROCEDURE — 40000305 ZZH STATISTIC PRE PROC ASSESS I: Performed by: OBSTETRICS & GYNECOLOGY

## 2018-03-17 PROCEDURE — 36000058 ZZH SURGERY LEVEL 3 EA 15 ADDTL MIN: Performed by: OBSTETRICS & GYNECOLOGY

## 2018-03-17 PROCEDURE — 37000008 ZZH ANESTHESIA TECHNICAL FEE, 1ST 30 MIN: Performed by: OBSTETRICS & GYNECOLOGY

## 2018-03-17 RX ORDER — LIDOCAINE 40 MG/G
CREAM TOPICAL
Status: DISCONTINUED | OUTPATIENT
Start: 2018-03-17 | End: 2018-03-17 | Stop reason: HOSPADM

## 2018-03-17 RX ORDER — SODIUM CHLORIDE, SODIUM LACTATE, POTASSIUM CHLORIDE, CALCIUM CHLORIDE 600; 310; 30; 20 MG/100ML; MG/100ML; MG/100ML; MG/100ML
INJECTION, SOLUTION INTRAVENOUS CONTINUOUS
Status: DISCONTINUED | OUTPATIENT
Start: 2018-03-17 | End: 2018-03-17 | Stop reason: HOSPADM

## 2018-03-17 RX ORDER — LIDOCAINE HYDROCHLORIDE 10 MG/ML
INJECTION, SOLUTION INFILTRATION; PERINEURAL PRN
Status: DISCONTINUED | OUTPATIENT
Start: 2018-03-17 | End: 2018-03-17

## 2018-03-17 RX ORDER — PROPOFOL 10 MG/ML
INJECTION, EMULSION INTRAVENOUS CONTINUOUS PRN
Status: DISCONTINUED | OUTPATIENT
Start: 2018-03-17 | End: 2018-03-17

## 2018-03-17 RX ORDER — FENTANYL CITRATE 50 UG/ML
25-50 INJECTION, SOLUTION INTRAMUSCULAR; INTRAVENOUS
Status: DISCONTINUED | OUTPATIENT
Start: 2018-03-17 | End: 2018-03-17 | Stop reason: HOSPADM

## 2018-03-17 RX ORDER — ONDANSETRON 2 MG/ML
4 INJECTION INTRAMUSCULAR; INTRAVENOUS EVERY 30 MIN PRN
Status: DISCONTINUED | OUTPATIENT
Start: 2018-03-17 | End: 2018-03-17 | Stop reason: HOSPADM

## 2018-03-17 RX ORDER — IBUPROFEN 200 MG
200-400 TABLET ORAL EVERY 6 HOURS PRN
Qty: 30 TABLET | Refills: 0 | COMMUNITY
Start: 2018-03-17

## 2018-03-17 RX ORDER — OXYCODONE HYDROCHLORIDE 5 MG/1
5-10 TABLET ORAL
Status: DISCONTINUED | OUTPATIENT
Start: 2018-03-17 | End: 2018-03-17 | Stop reason: HOSPADM

## 2018-03-17 RX ORDER — BUPIVACAINE HYDROCHLORIDE AND EPINEPHRINE 2.5; 5 MG/ML; UG/ML
30 INJECTION, SOLUTION EPIDURAL; INFILTRATION; INTRACAUDAL; PERINEURAL ONCE
Status: DISCONTINUED | OUTPATIENT
Start: 2018-03-17 | End: 2018-03-17 | Stop reason: HOSPADM

## 2018-03-17 RX ORDER — PROPOFOL 10 MG/ML
INJECTION, EMULSION INTRAVENOUS PRN
Status: DISCONTINUED | OUTPATIENT
Start: 2018-03-17 | End: 2018-03-17

## 2018-03-17 RX ORDER — BUPIVACAINE HYDROCHLORIDE AND EPINEPHRINE 2.5; 5 MG/ML; UG/ML
INJECTION, SOLUTION EPIDURAL; INFILTRATION; INTRACAUDAL; PERINEURAL PRN
Status: DISCONTINUED | OUTPATIENT
Start: 2018-03-17 | End: 2018-03-17 | Stop reason: HOSPADM

## 2018-03-17 RX ORDER — HYDROMORPHONE HYDROCHLORIDE 1 MG/ML
.3-.5 INJECTION, SOLUTION INTRAMUSCULAR; INTRAVENOUS; SUBCUTANEOUS EVERY 5 MIN PRN
Status: DISCONTINUED | OUTPATIENT
Start: 2018-03-17 | End: 2018-03-17 | Stop reason: HOSPADM

## 2018-03-17 RX ORDER — FENTANYL CITRATE 50 UG/ML
INJECTION, SOLUTION INTRAMUSCULAR; INTRAVENOUS PRN
Status: DISCONTINUED | OUTPATIENT
Start: 2018-03-17 | End: 2018-03-17

## 2018-03-17 RX ORDER — IBUPROFEN 600 MG/1
600 TABLET, FILM COATED ORAL EVERY 6 HOURS PRN
Status: DISCONTINUED | OUTPATIENT
Start: 2018-03-17 | End: 2018-03-17 | Stop reason: HOSPADM

## 2018-03-17 RX ORDER — ONDANSETRON 2 MG/ML
4 INJECTION INTRAMUSCULAR; INTRAVENOUS EVERY 6 HOURS PRN
Status: DISCONTINUED | OUTPATIENT
Start: 2018-03-17 | End: 2018-03-17 | Stop reason: HOSPADM

## 2018-03-17 RX ORDER — ACETAMINOPHEN 325 MG/1
650 TABLET ORAL EVERY 6 HOURS PRN
Status: DISCONTINUED | OUTPATIENT
Start: 2018-03-17 | End: 2018-03-17 | Stop reason: HOSPADM

## 2018-03-17 RX ORDER — OXYCODONE HYDROCHLORIDE 5 MG/1
5-10 TABLET ORAL
Qty: 10 TABLET | Refills: 0 | Status: SHIPPED | OUTPATIENT
Start: 2018-03-17 | End: 2019-08-21

## 2018-03-17 RX ORDER — ONDANSETRON 4 MG/1
4 TABLET, ORALLY DISINTEGRATING ORAL EVERY 6 HOURS PRN
Status: DISCONTINUED | OUTPATIENT
Start: 2018-03-17 | End: 2018-03-17 | Stop reason: HOSPADM

## 2018-03-17 RX ORDER — KETOROLAC TROMETHAMINE 30 MG/ML
INJECTION, SOLUTION INTRAMUSCULAR; INTRAVENOUS PRN
Status: DISCONTINUED | OUTPATIENT
Start: 2018-03-17 | End: 2018-03-17

## 2018-03-17 RX ORDER — GLYCOPYRROLATE 0.2 MG/ML
INJECTION, SOLUTION INTRAMUSCULAR; INTRAVENOUS PRN
Status: DISCONTINUED | OUTPATIENT
Start: 2018-03-17 | End: 2018-03-17

## 2018-03-17 RX ORDER — DIPHENHYDRAMINE HYDROCHLORIDE 50 MG/ML
25 INJECTION INTRAMUSCULAR; INTRAVENOUS EVERY 6 HOURS PRN
Status: DISCONTINUED | OUTPATIENT
Start: 2018-03-17 | End: 2018-03-17 | Stop reason: HOSPADM

## 2018-03-17 RX ORDER — NALOXONE HYDROCHLORIDE 0.4 MG/ML
.1-.4 INJECTION, SOLUTION INTRAMUSCULAR; INTRAVENOUS; SUBCUTANEOUS
Status: DISCONTINUED | OUTPATIENT
Start: 2018-03-17 | End: 2018-03-17 | Stop reason: HOSPADM

## 2018-03-17 RX ORDER — ONDANSETRON 4 MG/1
4 TABLET, ORALLY DISINTEGRATING ORAL EVERY 30 MIN PRN
Status: DISCONTINUED | OUTPATIENT
Start: 2018-03-17 | End: 2018-03-17 | Stop reason: HOSPADM

## 2018-03-17 RX ORDER — NEOSTIGMINE METHYLSULFATE 1 MG/ML
VIAL (ML) INJECTION PRN
Status: DISCONTINUED | OUTPATIENT
Start: 2018-03-17 | End: 2018-03-17

## 2018-03-17 RX ORDER — HYDROMORPHONE HCL/0.9% NACL/PF 0.2MG/0.2
0.2 SYRINGE (ML) INTRAVENOUS
Status: DISCONTINUED | OUTPATIENT
Start: 2018-03-17 | End: 2018-03-17 | Stop reason: HOSPADM

## 2018-03-17 RX ORDER — NALOXONE HYDROCHLORIDE 0.4 MG/ML
.1-.4 INJECTION, SOLUTION INTRAMUSCULAR; INTRAVENOUS; SUBCUTANEOUS
Status: DISCONTINUED | OUTPATIENT
Start: 2018-03-17 | End: 2018-03-17

## 2018-03-17 RX ORDER — ONDANSETRON 2 MG/ML
INJECTION INTRAMUSCULAR; INTRAVENOUS PRN
Status: DISCONTINUED | OUTPATIENT
Start: 2018-03-17 | End: 2018-03-17

## 2018-03-17 RX ORDER — LABETALOL HYDROCHLORIDE 5 MG/ML
10 INJECTION, SOLUTION INTRAVENOUS
Status: DISCONTINUED | OUTPATIENT
Start: 2018-03-17 | End: 2018-03-17 | Stop reason: HOSPADM

## 2018-03-17 RX ADMIN — Medication 0.2 MG: at 06:29

## 2018-03-17 RX ADMIN — PROPOFOL 30 MCG/KG/MIN: 10 INJECTION, EMULSION INTRAVENOUS at 01:30

## 2018-03-17 RX ADMIN — PHENYLEPHRINE HYDROCHLORIDE 50 MCG: 10 INJECTION, SOLUTION INTRAMUSCULAR; INTRAVENOUS; SUBCUTANEOUS at 01:42

## 2018-03-17 RX ADMIN — FENTANYL CITRATE 50 MCG: 50 INJECTION INTRAMUSCULAR; INTRAVENOUS at 02:56

## 2018-03-17 RX ADMIN — Medication 0.5 MG: at 03:09

## 2018-03-17 RX ADMIN — FENTANYL CITRATE 50 MCG: 50 INJECTION INTRAMUSCULAR; INTRAVENOUS at 03:05

## 2018-03-17 RX ADMIN — ONDANSETRON 4 MG: 2 INJECTION INTRAMUSCULAR; INTRAVENOUS at 01:49

## 2018-03-17 RX ADMIN — PROPOFOL 120 MG: 10 INJECTION, EMULSION INTRAVENOUS at 01:26

## 2018-03-17 RX ADMIN — MIDAZOLAM 2 MG: 1 INJECTION INTRAMUSCULAR; INTRAVENOUS at 01:21

## 2018-03-17 RX ADMIN — PHENYLEPHRINE HYDROCHLORIDE 50 MCG: 10 INJECTION, SOLUTION INTRAMUSCULAR; INTRAVENOUS; SUBCUTANEOUS at 02:02

## 2018-03-17 RX ADMIN — SODIUM CHLORIDE, POTASSIUM CHLORIDE, SODIUM LACTATE AND CALCIUM CHLORIDE: 600; 310; 30; 20 INJECTION, SOLUTION INTRAVENOUS at 01:21

## 2018-03-17 RX ADMIN — KETOROLAC TROMETHAMINE 30 MG: 30 INJECTION, SOLUTION INTRAMUSCULAR at 02:16

## 2018-03-17 RX ADMIN — Medication 100 MG: at 01:26

## 2018-03-17 RX ADMIN — FENTANYL CITRATE 50 MCG: 50 INJECTION, SOLUTION INTRAMUSCULAR; INTRAVENOUS at 02:01

## 2018-03-17 RX ADMIN — FENTANYL CITRATE 100 MCG: 50 INJECTION, SOLUTION INTRAMUSCULAR; INTRAVENOUS at 01:26

## 2018-03-17 RX ADMIN — OXYCODONE HYDROCHLORIDE 5 MG: 5 TABLET ORAL at 12:36

## 2018-03-17 RX ADMIN — ROCURONIUM BROMIDE 20 MG: 10 INJECTION INTRAVENOUS at 01:35

## 2018-03-17 RX ADMIN — PHENYLEPHRINE HYDROCHLORIDE 100 MCG: 10 INJECTION, SOLUTION INTRAMUSCULAR; INTRAVENOUS; SUBCUTANEOUS at 01:29

## 2018-03-17 RX ADMIN — LIDOCAINE HYDROCHLORIDE 50 MG: 10 INJECTION, SOLUTION INFILTRATION; PERINEURAL at 01:26

## 2018-03-17 RX ADMIN — DIPHENHYDRAMINE HYDROCHLORIDE 25 MG: 50 INJECTION, SOLUTION INTRAMUSCULAR; INTRAVENOUS at 03:14

## 2018-03-17 RX ADMIN — Medication 0.2 MG: at 09:08

## 2018-03-17 RX ADMIN — Medication 4 MG: at 02:19

## 2018-03-17 RX ADMIN — Medication 0.2 MG: at 11:04

## 2018-03-17 RX ADMIN — IBUPROFEN 600 MG: 600 TABLET ORAL at 12:36

## 2018-03-17 RX ADMIN — GLYCOPYRROLATE 0.6 MG: 0.2 INJECTION, SOLUTION INTRAMUSCULAR; INTRAVENOUS at 02:19

## 2018-03-17 RX ADMIN — ROCURONIUM BROMIDE 10 MG: 10 INJECTION INTRAVENOUS at 01:26

## 2018-03-17 RX ADMIN — GLYCOPYRROLATE 0.2 MG: 0.2 INJECTION, SOLUTION INTRAMUSCULAR; INTRAVENOUS at 01:26

## 2018-03-17 ASSESSMENT — ENCOUNTER SYMPTOMS: DYSRHYTHMIAS: 0

## 2018-03-17 NOTE — ED NOTES
[facility]  ED Nurse Handoff Report    Holli Thacker is a 42 year old female   ED Chief complaint: Abdominal Pain  . ED Diagnosis:   Final diagnoses:   Complex cyst of left ovary   Suprapubic abdominal pain     Allergies:   Allergies   Allergen Reactions     Azithromycin      Erythromycin      Verapamil        Code Status: Full Code  Activity level - Baseline/Home:  Independent. Activity Level - Current:   Independent. Lift room needed: No. Bariatric: No   Needed: No   Isolation: No. Infection: Not Applicable.     Vital Signs:   Vitals:    03/16/18 2300 03/16/18 2315 03/16/18 2338 03/16/18 2345   BP: 111/74 118/70  109/68   Pulse:       Resp:       Temp:       TempSrc:       SpO2: 95% 95% 96% 98%       Cardiac Rhythm:  ,      Pain level: 0-10 Pain Scale: 8  Patient confused: No. Patient Falls Risk: No.   Elimination Status: Has voided   Patient Report - Initial Complaint: Abdominal pain. Focused Assessment: Abdomen tender   Tests Performed: Labs, US. Abnormal Results:   Labs Ordered and Resulted from Time of ED Arrival Up to the Time of Departure from the ED   COMPREHENSIVE METABOLIC PANEL - Abnormal; Notable for the following:        Result Value    Glucose 121 (*)     Calcium 8.4 (*)     All other components within normal limits   CBC WITH PLATELETS DIFFERENTIAL - Abnormal; Notable for the following:     Absolute Neutrophil 9.8 (*)     Absolute Lymphocytes 0.4 (*)     All other components within normal limits   ROUTINE UA WITH MICROSCOPIC - Abnormal; Notable for the following:     Squamous Epithelial /HPF Urine 6 (*)     Mucous Urine Present (*)     All other components within normal limits   HCG QUALITATIVE URINE     US Pelvis Cmplt w Transvag & Doppler LmtPel Duplex Limited   Preliminary Result   IMPRESSION:  2.8 cm complex cyst within the ovary position to the   right of the uterus. This may represent the left ovary as the right   ovary is reported to be surgically absent. Small  amount of complex   peritoneal fluid within the pelvis.        Treatments provided: Pain meds, IV, antiemetics  Family Comments:  at bedside  OBS brochure/video discussed/provided to patient:  No  ED Medications:   Medications   ondansetron (ZOFRAN) injection 4 mg (4 mg Intravenous Given 3/16/18 2047)   HYDROmorphone (PF) (DILAUDID) injection 0.5 mg (0.5 mg Intravenous Given 3/16/18 2336)     Drips infusing:  No  For the majority of the shift, the patient's behavior Green. Interventions performed were NA.     Severe Sepsis OR Septic Shock Diagnosis Present: No      ED Nurse Name/Phone Number: Holli Mckoy,   11:55 PM

## 2018-03-17 NOTE — PLAN OF CARE
Problem: Patient Care Overview  Goal: Plan of Care/Patient Progress Review  Outcome: Adequate for Discharge Date Met: 03/17/18  Vitals stable, afebrile. Dc instructions and medications reviewed with pt and questions answered. Given paper prescription for oxycodone. Given info for follow up appt with [facility] OB/Gyn, pt to call and make appt for 3-4 wks. Dc to home with . IV dc'd.

## 2018-03-17 NOTE — ANESTHESIA POSTPROCEDURE EVALUATION
Patient: Holli Thacker    Procedure(s):  Diagnostic laparoscopy, right ovarian cystectomy and partial salpingectomy - Wound Class: II-Clean Contaminated    Diagnosis:bleeding  Diagnosis Additional Information: Preoperative diagnosis:   - Pelvic pain   -  ovarian cyst and free fluid in the pelvis     Postoperative diagnosis:   - same as above, s/p below listed procedures      Procedure:   - Diagnostic laparoscopy   - Right ovarian cystectomy and partial sal, pingectomy        Anesthesia Type:  General, ETT, RSI    Note:  Anesthesia Post Evaluation    Patient location during evaluation: PACU  Patient participation: Able to fully participate in evaluation  Level of consciousness: awake  Pain management: adequate  Airway patency: patent  Cardiovascular status: acceptable  Respiratory status: acceptable  Hydration status: euvolemic  PONV: controlled     Anesthetic complications: None          Last vitals:  Vitals:    03/17/18 0330 03/17/18 0345 03/17/18 0358   BP: 91/51 94/57    Pulse:      Resp: 16 20    Temp:      SpO2: 94% 97% 100%         Electronically Signed By: Franko Spaulding MD  March 17, 2018  4:03 AM

## 2018-03-17 NOTE — PLAN OF CARE
Problem: Patient Care Overview  Goal: Individualization & Mutuality  Outcome: Improving  Tolerated full liquid diet.  Dangled   Using I/S  Up in room   Voided  Order reg diet.

## 2018-03-17 NOTE — PROGRESS NOTES
"Gyn Post-operative Note POD# 0    S:  Patient doing well.  Pain adequately controlled on pain medication.  Ambulating.  Tolerating clear liquids.  Voiding.  Resting comfortably post op.     O:   BP (!) 89/50  Pulse 86  Temp 98.4  F (36.9  C) (Oral)  Resp 20  Ht 1.651 m (5' 5\")  LMP 03/01/2018 (Approximate)  SpO2 97%      Gen- A&O, NAD  Abd- soft, non-tender, non-distended, no guarding or rebound  Incision(s)- C/D/I  Ext- Non-tender, no edema    Labs:    Hemoglobin   Date Value Ref Range Status   03/16/2018 13.9 11.7 - 15.7 g/dL Final   ]     Pathology is pending    A/P:  42 year old POD# 0 s/p laparoscopy, pelviscopy, right ovarian cystectomy and partial salpingectomy (possible intermittant torsion).   Stable post op.      1.  Routine post-op cares  2.  Advance cares per routine  3.  Ambulate  4.  Analgesia.  She has discharge pain meds ordered.    5.  The plan of care was discussed with the patient.  She expressed understanding and agreement.  6.  Discharge is anticipated later today.   7.  Follow up with a post op visit in 3-4 weeks at our office.  The patient may choose to follow up with her PCP which is felt to be appropriate if she desires.    8.  All questions answered.     Brian Langford  3/17/2018  8:18 AM   "

## 2018-03-17 NOTE — ANESTHESIA CARE TRANSFER NOTE
Patient: Holli Thacker    Procedure(s):  Diagnostic laparoscopy, right ovarian cystectomy and partial salpingectomy - Wound Class: II-Clean Contaminated    Diagnosis: bleeding  Diagnosis Additional Information: No value filed.    Anesthesia Type:   General, ETT, RSI     Note:  Airway :Face Mask  Patient transferred to:PACU  Comments: VSS.  Spontaneously breathing O2 per open face mask.  Report given to RN.Handoff Report: Identifed the Patient, Identified the Reponsible Provider, Reviewed the pertinent medical history, Discussed the surgical course, Reviewed Intra-OP anesthesia mangement and issues during anesthesia, Set expectations for post-procedure period and Allowed opportunity for questions and acknowledgement of understanding      Vitals: (Last set prior to Anesthesia Care Transfer)    CRNA VITALS  3/17/2018 0204 - 3/17/2018 0243      3/17/2018             Resp Rate (observed): (!)  7                Electronically Signed By: JOHN Corey CRNA  March 17, 2018  2:43 AM

## 2018-03-17 NOTE — H&P
GYN Admission Note    HPI:  Pt is a 42 year old No obstetric history on file. with LMP who presented to the ED  c/o pelvic pain, n/v.  Her symptoms began earlier this week.  she presented to the ED yesterday was discharged and return this evening unable to take her pain medication due to nausea and vomiting.  US notes 2.8 cm left ovarian cyst and free fluid in the pelvis.  There is flow to the ovary. Her right ovary is surgically absent.      GynHx: s/p CS x's1, laparoscopy x's 2    Med Hx:   Past Medical History:   Diagnosis Date     Anxiety      Arnold-Chiari malformation, type I (H)      Depressive disorder      Fibromyalgia      Migraine      Rheumatoid arthritis flare (H)      Uncomplicated asthma        Surg Hx:    Past Surgical History:   Procedure Laterality Date     GYN SURGERY       OVARY SURGERY Left        Meds:    No current facility-administered medications for this encounter.      Current Outpatient Prescriptions   Medication     ondansetron (ZOFRAN ODT) 4 MG ODT tab     oxyCODONE IR (ROXICODONE) 5 MG tablet     tofacitinib (XELJANZ) 5 MG tablet     Zonisamide (ZONEGRAN PO)     Cholecalciferol (VITAMIN D3) 3000 UNITS TABS     pantoprazole (PROTONIX) 40 MG EC tablet     omalizumab (XOLAIR) 150 MG injection     cyclobenzaprine (FLEXERIL) 10 MG tablet     nortriptyline (PAMELOR) 50 MG capsule     Sertraline HCl (ZOLOFT PO)     fluticasone-salmeterol (ADVAIR) 500-50 MCG/DOSE diskus inhaler     fluticasone (FLONASE) 50 MCG/ACT nasal spray     Montelukast Sodium (SINGULAIR PO)     ZOLMitriptan (ZOMIG PO)     ipratropium - albuterol 0.5 mg/2.5 mg/3 mL (DUONEB) 0.5-2.5 (3) MG/3ML nebulization     albuterol (2.5 MG/3ML) 0.083% nebulizer solution       Allergies:   Allergies   Allergen Reactions     Azithromycin      Erythromycin      Verapamil        Soc Hx:   Social History     Social History     Marital status:      Spouse name: N/A     Number of children: N/A     Years of education: N/A      Occupational History     Not on file.     Social History Main Topics     Smoking status: Current Every Day Smoker     Smokeless tobacco: Not on file     Alcohol use No     Drug use: No     Sexual activity: Not on file     Other Topics Concern     Not on file     Social History Narrative       Fam Hx: No family history on file.    PE:    VS:  /68  Pulse 94  Temp 98.2  F (36.8  C) (Oral)  Resp 18  LMP 03/01/2018 (Approximate)  SpO2 98%  Gen:  A&O, NAD  Lungs:  CTAB  CV:  RRR  Abd:  Generalized tenderness concentrated in the lower pelvis, + peritoneal signs with guarding and rebound  Pelvic: defer     Labs:    Results for orders placed or performed during the hospital encounter of 03/16/18 (from the past 24 hour(s))   Comprehensive metabolic panel   Result Value Ref Range    Sodium 139 133 - 144 mmol/L    Potassium 4.2 3.4 - 5.3 mmol/L    Chloride 109 94 - 109 mmol/L    Carbon Dioxide 25 20 - 32 mmol/L    Anion Gap 5 3 - 14 mmol/L    Glucose 121 (H) 70 - 99 mg/dL    Urea Nitrogen 11 7 - 30 mg/dL    Creatinine 0.75 0.52 - 1.04 mg/dL    GFR Estimate 85 >60 mL/min/1.7m2    GFR Estimate If Black >90 >60 mL/min/1.7m2    Calcium 8.4 (L) 8.5 - 10.1 mg/dL    Bilirubin Total 0.3 0.2 - 1.3 mg/dL    Albumin 3.6 3.4 - 5.0 g/dL    Protein Total 7.3 6.8 - 8.8 g/dL    Alkaline Phosphatase 103 40 - 150 U/L    ALT 17 0 - 50 U/L    AST 15 0 - 45 U/L   CBC with platelets differential   Result Value Ref Range    WBC 11.0 4.0 - 11.0 10e9/L    RBC Count 4.73 3.8 - 5.2 10e12/L    Hemoglobin 13.9 11.7 - 15.7 g/dL    Hematocrit 43.9 35.0 - 47.0 %    MCV 93 78 - 100 fl    MCH 29.4 26.5 - 33.0 pg    MCHC 31.7 31.5 - 36.5 g/dL    RDW 13.9 10.0 - 15.0 %    Platelet Count 355 150 - 450 10e9/L    Diff Method Automated Method     % Neutrophils 89.0 %    % Lymphocytes 4.0 %    % Monocytes 3.8 %    % Eosinophils 2.5 %    % Basophils 0.3 %    % Immature Granulocytes 0.4 %    Nucleated RBCs 0 0 /100    Absolute Neutrophil 9.8 (H) 1.6 - 8.3  10e9/L    Absolute Lymphocytes 0.4 (L) 0.8 - 5.3 10e9/L    Absolute Monocytes 0.4 0.0 - 1.3 10e9/L    Absolute Eosinophils 0.3 0.0 - 0.7 10e9/L    Absolute Basophils 0.0 0.0 - 0.2 10e9/L    Abs Immature Granulocytes 0.0 0 - 0.4 10e9/L    Absolute Nucleated RBC 0.0    US Pelvis Cmplt w Transvag & Doppler LmtPel Duplex Limited    Narrative    COMPLETE PELVIS ULTRASOUND WITH TRANSVAGINAL AND DOPPLER LIMITED   3/16/2018 10:27 PM    HISTORY:  Pelvic pain, left suprapubic abdominal pain. Concern for  ruptured cyst versus ovarian torsion.    FINDINGS: Ultrasound was performed transvaginally as well as  transabdominally in order to optimally evaluate the adnexa.    The uterus measured 6.4 x 5.6 x 4.9 cm with an endometrial thickness  of 1.0 cm. No myometrial abnormality was demonstrated. Ovary  positioned to the right of the uterus was noted containing a 2.8 cm  complex cyst. Doppler waveform analysis demonstrated grossly normal  blood flow to this ovary. This may represent the left ovary as the  right ovary is reportedly surgically absent. There was a small amount  of complex free pelvic fluid.      Impression    IMPRESSION:  2.8 cm complex cyst within the ovary position to the  right of the uterus. This may represent the left ovary as the right  ovary is reported to be surgically absent. Small amount of complex  peritoneal fluid within the pelvis.   UA with Microscopic   Result Value Ref Range    Color Urine Yellow     Appearance Urine Slightly Cloudy     Glucose Urine Negative NEG^Negative mg/dL    Bilirubin Urine Negative NEG^Negative    Ketones Urine Negative NEG^Negative mg/dL    Specific Gravity Urine 1.023 1.003 - 1.035    Blood Urine Negative NEG^Negative    pH Urine 5.0 5.0 - 7.0 pH    Protein Albumin Urine Negative NEG^Negative mg/dL    Urobilinogen mg/dL 2.0 0.0 - 2.0 mg/dL    Nitrite Urine Negative NEG^Negative    Leukocyte Esterase Urine Negative NEG^Negative    Source Midstream Urine     WBC Urine <1 0 - 5  /HPF    RBC Urine <1 0 - 2 /HPF    Squamous Epithelial /HPF Urine 6 (H) 0 - 1 /HPF    Mucous Urine Present (A) NEG^Negative /LPF   HCG qualitative urine (UPT)   Result Value Ref Range    HCG Qual Urine Negative NEG^Negative        A/P:  42 year old with pelvic pain left ovarian cyst with free fluid and intractable pain causing her to present to the ED again this evening  1. Diagnotic laparoscopy left ovarian cystectomy possible left oophorectomy: R/B/A discussed she understands that if removal of the left ovary is needed then she will enter surgical menopause and require HRT.   2. Pain control  3. Dispo: d/c in the am as long as stable    Juice Gold  3/17/2018  12:07 AM

## 2018-03-17 NOTE — ED PROVIDER NOTES
History     Chief Complaint:  Abdominal pain    HPI   Holli Thacker is a 42 year old female with a history of ovarian cyst who presents to the emergency department for evaluation of abdominal pain. The patient states that 4 days ago she developed abdominal pain very similar to the pain she experienced with her previous ovarian cyst. She notes this pain has kept her bed ridden since its onset, and comes in waves. It sometimes shoots across her belly, and also has some low back pain due to this pain. Her pain is exacerbated by movement. She was seen here in the ED yesterday where imaging was obtained which revealed 2 left ovarian cysts. She was discharged with oxycodone and Zofran, however, she reports this has not touched her pain level. She has since began vomiting, therefore unable to keep Zofran or food down. She also notes being nauseous and lightheaded. The pain goes from her rib cage and down. When she coughs, her abdomen feels like it's going to blow. She has had no dysuria, hematuria, or fever. The patient had her right ovary removed due to her previous ovarian cyst. She still gets periods, the last of which was in the beginning of the month.    ULTRASOUND PELVIS COMPLETE W TRANSVAGINAL AND DOPPLER LIMITED March  15, 2018 2:32 PM  IMPRESSION: Retroverted uterus. Complex cyst left ovary. No ultrasound  evidence of ovarian torsion. Right ovary is surgically absent.    Allergies:  Azithromycin  Erythromycin  Verapamil       Medications:    Ondansetron (ZOFRAN-ODT) 4 MG ODT tab  Oxycodone IR (ROXICODONE) 5 MG tablet  tofacitinib (XELJANZ) 5 MG tablet  Zonisamide (ZONEGRAN PO)  Cholecalciferol (VITAMIN D3) 3000 UNITS TABS  NAPROXEN PO  Zolmitriptan (ZOMIG PO)  pantoprazole (PROTONIX) 40 MG EC tablet  omalizumab (XOLAIR) 150 MG injection  meclizine (ANTIVERT) 25 MG tablet  cyclobenzaprine (FLEXERIL) 10 MG tablet  nortriptyline (PAMELOR) 50 MG capsule  ipratropium - albuterol 0.5 mg/2.5 mg/3 mL (DUONEB)  0.5-2.5 (3) MG/3ML nebulization  albuterol (2.5 MG/3ML) 0.083% nebulizer solution  Sertraline HCl (ZOLOFT PO)  fluticasone-salmeterol (ADVAIR) 500-50 MCG/DOSE diskus inhaler  fluticasone (FLONASE) 50 MCG/ACT nasal spray  Montelukast Sodium (SINGULAIR PO)     Past Medical History:    Anxiety  Arnold-Chiari malformation type 1  Depressive disorder  Fibromyalgia  Migraine  Rheumatoid arthritis  Asthma  Uticaria, idiopathic     Past Surgical History:    Gyn surgery  Ovary surgery      Family History:    History reviewed. No pertinent family history.      Social History:  Smoking status: Current every day smoker  Alcohol use: No  Marital Status:      Review of Systems   Constitutional: Positive for appetite change. Negative for chills, diaphoresis and fever.   Cardiovascular: Negative for chest pain.   Gastrointestinal: Positive for abdominal pain, nausea and vomiting. Negative for constipation and diarrhea.   Genitourinary: Negative for dysuria, flank pain and hematuria.   Musculoskeletal: Negative for back pain.   All other systems reviewed and are negative.    Physical Exam   Patient Vitals for the past 24 hrs:   BP Temp Temp src Pulse Resp SpO2   03/16/18 2345 109/68 - - - - 98 %   03/16/18 2338 - - - - - 96 %   03/16/18 2315 118/70 - - - - 95 %   03/16/18 2300 111/74 - - - - 95 %   03/16/18 2245 101/60 - - - - 96 %   03/16/18 2145 114/65 - - - - 95 %   03/16/18 2130 120/62 - - - - 95 %   03/16/18 2115 137/68 - - - - 96 %   03/16/18 2100 99/46 - - - - 95 %   03/16/18 2045 116/66 - - - - 95 %   03/16/18 1933 101/55 - - - - -   03/16/18 1931 - 98.2  F (36.8  C) Oral 94 18 98 %     Physical Exam  General: Alert, appears well-developed and well-nourished. Cooperative.     In moderate distress.  In fetal position on bed  HEENT:  Head:  Atraumatic  Ears:  External ears are normal  Mouth/Throat:  Oropharynx is without erythema or exudate and mucous membranes are moist.   Eyes:   Conjunctivae normal and EOM are  normal. No scleral icterus.  CV:  Normal rate, regular rhythm, normal heart sounds and radial pulses are 2+ and symmetric.  No murmur.  Resp:  Breath sounds are clear bilaterally    Non-labored, no retractions or accessory muscle use  GI:  Abdomen is soft, no distension,  LLQ tenderness with guarding. No rebound.  Left CVA tenderness  MS:  Normal range of motion. No edema.    Normal strength in all 4 extremities.     Back atraumatic.    No midline cervical, thoracic, or lumbar tenderness  Skin:  Warm and dry.  No rash or lesions noted.  Neuro: Alert. Normal strength.  GCS: 15  Psych:  Normal mood and affect.    Emergency Department Course     Imaging:  Radiographic findings were communicated with the patient who voiced understanding of the findings.    US Pelvis Cmplt w/ Transvag & Doppler LmtPel Duplex Limited:  IMPRESSION:  2.8 cm complex cyst within the ovary position to the  right of the uterus. This may represent the left ovary as the right  ovary is reported to be surgically absent. Small amount of complex  peritoneal fluid within the pelvis.  Results per radiology.    Laboratory:  CBC: WNL (WBC 11.0, HGB 13.9, )   CMP: Glucose 121 (H), Calcium 8.4 (L) o/w WNL (Creatinine 0.75)  HCG: Negative  UA: Squamous epithelial 6 (H), Mucous present, o/w Negative    Interventions:  2047: Zofran 4mg IV injection   2047: Dilaudid 0.5 mg IV injection  2244: Dilaudid 0.5 mg IV injection  2336: Dilaudid 0.5 mg IV injection    Emergency Department Course:  Past medical records, nursing notes, and vitals reviewed.  2027: I performed an exam of the patient and obtained history, as documented above. GCS 15.    IV inserted and blood drawn. The patient was placed on continuous cardiac monitoring and pulse oximetry.    The patient was sent for an ultrasound while in the emergency department, findings above.    2253: I discussed the case with Dr. Gold from [facility] regarding the patient. He will come to the ED to  evaluate the patient    2325: I discussed the case with Dr. Jarrett with the hospitalist service regarding the patient.    Findings and plan explained to the Patient who consents to admission.     2347: Discussed the patient with Dr. Gold, who will admit the patient to an OR bed for further monitoring, evaluation, and treatment.     2359: Dr. Gold is in the ED evaluating the patient.    Impression & Plan      Medical Decision Making:  Patient is a 42-year-old female who re-presents to emergency department for continued left lower quadrant abdominal pain.  Patient is convinced this pain is related to her ovarian cyst.  Patient was seen in the emergency department yesterday and had ultrasound diagnosing a complex cyst of the left ovary.  Patient has had a surgical removal of her right ovary historically.  Patient had a repeat ultrasound tonight showing the continued complex cyst of the left ovary.  Patient does have a small amount of free pelvic fluid concerning that this may be a hemorrhagic cyst.  I do have concern for intermittent torsion as the patient has had increased pain as well as vomiting associated with pain throughout today.  Patient's labs appear unchanged in comparison with yesterday's lab evaluation.  Patient's urine and UPT are negative.  I spoke with Dr. Gold with OB/GYN regarding the patient's concerning exam and potential need for laparoscopic intervention due to the progressive and worsening pain.  Patient does have peritoneal signs on my reexamination and is continuing to require IV pain medication and IV nausea medication.  Dr. Gold assessed the patient at bedside and agreed to laparoscopic intervention at this point.  Patient will be admitted to the operating room under the care of Dr. Gold.  Patient remained otherwise stable under my care in the emergency department.  Patient sent to OR.      Diagnosis:    ICD-10-CM   1. Complex cyst of left ovary N83.292   2. Suprapubic abdominal pain  R10.2       Disposition:  Admitted to Dr. Gold.      Ivy Muhammad  3/16/2018   [facility] EMERGENCY DEPARTMENT  I, Ivy Muhammad, am serving as a scribe at 8:27 PM on 3/16/2018 to document services personally performed by Rolando Canales MD based on my observations and the provider's statements to me.        Rolando Canales MD  03/17/18 0038

## 2018-03-17 NOTE — OP NOTE
Operative Note   [facility]  2018     Holli Thacker    : 1975   MRN: [id number]     Preoperative diagnosis:   - Pelvic pain   -  ovarian cyst and free fluid in the pelvis    Postoperative diagnosis:   - same as above, s/p below listed procedures     Procedure:   - Diagnostic laparoscopy   - Right ovarian cystectomy and partial salpingectomy     Surgeon: Juice Gold     Anesthesia: GETA  Complications: none   EBL: 5 cc  Urine: 25 cc of clear urine   Pathology: right ovarian cyst and portion of right tube   Findings:  On EUA normal anteverted uterus.  On Laparoscopy there was a normal upper abdomen. Normal appearing uterus, fallopian tubes s/p ligation.  The left ovary was only a remnant and the right ovary had a 3 cm cyst that was dangling of the distal end in the posterior cul-de-sac.  This appeared to be twisting and possible causing an intermittent torsion.   Indications: Pt is a 42 year old  who has had pain for about 1 week that was worsening yesterday causing her to present to the ED she was given pain medication and discharged.  Earlier this evening the pain became acutely worse she had nausea and vomiting and was unable to take pain meds.  On evaluation she was noted to have peritoneal signs.  The risks, benefits and alternatives of the procedure was discussed in detail with the patient and she agreed to proceed. Informed consent was signed prior to the procedure.   Procedure:   The patient was taken to the operating room where she was prepped and draped in the usual sterile fashion. GETA was obtained and found to be adequate. She was positioned to the dorsal lithotomy position with yellow-fin stirrups.   A 5 mm umbilical skin incision was made. Through this a Veress needle was inserted into the abdominal cavity and CO2 started. The opening pressure was noted to be 8 mm of mercury. The abdomen was filled to a pressure of 15 mm Hg. The Veress needle was withdrawn. A  5 mm trochar sleeve was placed and the laparoscope inserted, confirming intrabdominal placement. A 5 mm skin incision was then made in RLQ. Through which a trochar sleeve was placed into the abdominal cavity using direct observation with the laparoscope.  On the left a 5 mm port was placed under direct observation. The above noted findings were apparent.    The right ovarian cyst was removed via the thunder beat morcellation devise.  The right fallopian tube was hyperemic and friable thus necessitating removal of the distal portion to control bleeding.  The rlq port sight was enlarged to place a 12mm port and remove the pathology.   There was hemostasis at the operative sites. The RLQ port fascia was closed with 0 vicryl utilizing the abhilash serrano needle.  The gas was allowed to escape from the abdomen and the trochars were removed. The skin was closed with 4-0 Vicryl and dermabond  There were no complications to the procedure. Blood loss was minimal. The patient went to the postanesthesia recovery room in stable condition.   Sponge, needle and lap count correct times 2.  Juice Gold   March 17, 2018

## 2018-03-17 NOTE — PLAN OF CARE
Problem: Surgery Nonspecified (Adult)  Goal: Signs and Symptoms of Listed Potential Problems Will be Absent, Minimized or Managed (Surgery Nonspecified)  Signs and symptoms of listed potential problems will be absent, minimized or managed by discharge/transition of care (reference Surgery Nonspecified (Adult) CPG).   R.N.  Shift note:  Sleepy but arouses easily, offers no c/o pain, incisions are dry and intact, no nausea, positioning independently, no bowel sounds heard, lungs clear, will continue to monitor closely and provide for needs, incentive spirometer in room but too sleepy for instructions

## 2018-03-17 NOTE — PHARMACY-ADMISSION MEDICATION HISTORY
Admission medication history interview status for this patient is complete. See [facility] admission navigator for allergy information, prior to admission medications and immunization status.     Medication history interview source(s):Patient  Medication history resources (including written lists, pill bottles, clinic record):None    Changes made to PTA medication list:  Added: flexeril, singulair, protonix  Deleted: antivert, naproxen  Changed: none    Actions taken by pharmacist (provider contacted, etc):None     Additional medication history information:None    Medication reconciliation/reorder completed by provider prior to medication history? No    For patients on insulin therapy: no (Yes/No)   Lantus/levemir/NPH/Mix 70/30 dose: ___ in AM/PM or twice daily   Sliding scale Novolog Y/N   If Yes, do you have a baseline novolog pre-meal dose: ______units with meals   Patients eat three meals a day: Y/N ---  How many episodes of hypoglycemia (low blood glucose) do you have weekly: ---   How many missed doses do you have a week: ---  How many times do you check your blood glucose per day: ---  Any Barriers to therapy: cost of medications/comfortable with giving injections (if applicable)/ comfortable and confident with current diabetes regimen ---      Prior to Admission medications    Medication Sig Last Dose Taking? Auth Provider   ondansetron (ZOFRAN ODT) 4 MG ODT tab Take 1 tablet (4 mg) by mouth every 6 hours as needed for nausea 3/16/2018 at 1400 Yes Edgar Owens MD   oxyCODONE IR (ROXICODONE) 5 MG tablet Take 1-2 tablets (5-10 mg) by mouth every 4 hours as needed for pain 3/16/2018 at 1400 Yes Edgar Owens MD   tofacitinib (XELJANZ) 5 MG tablet Take 5 mg by mouth 2 times daily Past Month at Unknown time Yes Reported, Patient   Zonisamide (ZONEGRAN PO) Take 200 mg by mouth At Bedtime  3/15/2018 at Unknown time Yes Reported, Patient   Cholecalciferol (VITAMIN D3) 3000 UNITS TABS Take 5,000 Units by  mouth daily 3/15/2018 at Unknown time Yes Reported, Patient   pantoprazole (PROTONIX) 40 MG EC tablet Take 40 mg by mouth every morning  3/15/2018 at Unknown time Yes Reported, Patient   omalizumab (XOLAIR) 150 MG injection Inject 150 mg Subcutaneous See Admin Instructions Past Month at Unknown time Yes Reported, Patient   cyclobenzaprine (FLEXERIL) 10 MG tablet Take 10 mg by mouth At Bedtime  3/15/2018 at Unknown time Yes Reported, Patient   nortriptyline (PAMELOR) 50 MG capsule Take on capsule at betime Past Month at Unknown time Yes Reported, Patient   Sertraline HCl (ZOLOFT PO) Take 200 mg by mouth At Bedtime  3/15/2018 at Unknown time Yes Reported, Patient   fluticasone-salmeterol (ADVAIR) 500-50 MCG/DOSE diskus inhaler Inhale 1 puff into the lungs 2 times daily 3/16/2018 at am Yes Reported, Patient   fluticasone (FLONASE) 50 MCG/ACT nasal spray Spray 1 spray into both nostrils daily  Past Month at Unknown time Yes Reported, Patient   Montelukast Sodium (SINGULAIR PO) Take 10 mg by mouth At Bedtime  3/15/2018 at Unknown time Yes Reported, Patient   ZOLMitriptan (ZOMIG PO) Take 5 mg by mouth at onset of headache for migraine More than a month at Unknown time  Reported, Patient   ipratropium - albuterol 0.5 mg/2.5 mg/3 mL (DUONEB) 0.5-2.5 (3) MG/3ML nebulization Take 1 vial (3 mLs) by nebulization 3 times daily More than a month at Unknown time  Rolando Aleman MD   albuterol (2.5 MG/3ML) 0.083% nebulizer solution Take 1 vial (2.5 mg) by nebulization every 4 hours as needed for shortness of breath / dyspnea or wheezing   Mulugeta Zaman MD

## 2018-03-17 NOTE — ED NOTES
Pt reports she was seen here yesterday and diagnosed with 2 ovarian cysts on the left. Today pt states pain is much worse and pt is vomiting. Pain and nausea meds prescribed yesterday are not helping at all. Last dose taken at 2 pm.

## 2018-03-17 NOTE — ANESTHESIA PREPROCEDURE EVALUATION
PAC NOTE:       ANESTHESIA PRE EVALUATION:  Anesthesia Evaluation     .             ROS/MED HX    ENT/Pulmonary:     (+)Mild Persistent asthma , . .    Neurologic:     (+)migraines, other neuro Arnold-Chiari Malformation Type 1    Cardiovascular:        (-) hypertension, CAD, CHF, arrhythmias, pulmonary hypertension and dyslipidemia   METS/Exercise Tolerance:     Hematologic:        (-) anemia   Musculoskeletal:   (+) arthritis (RA), , , other musculoskeletal- Fibromyalgia      GI/Hepatic:        (-) GERD, hiatal hernia and hepatitis   Renal/Genitourinary:     (+) Other Renal/ Genitourinary, Ovarian Cyst   (-) renal disease   Endo:      (-) Type I DM, Type II DM, thyroid disease, chronic steroid usage, other endocrine disorder and obesity   Psychiatric:     (+) psychiatric history anxiety and depression      Infectious Disease:  - neg infectious disease ROS       Malignancy:         Other:                     Physical Exam      Airway   Mallampati: II  TM distance: >3 FB  Neck ROM: full    Dental     Cardiovascular   Rhythm and rate: regular and normal  (-) no murmur    Pulmonary    breath sounds clear to auscultation    Other findings: Lab Test        03/16/18     03/15/18     08/13/16                       2035          1342          1516          WBC          11.0         9.4          6.2           HGB          13.9         12.5         12.1          MCV          93           93           86            PLT          355          328          347            Lab Test        03/16/18     03/15/18     08/13/16                       2035          1342          1516          NA           139          140          139           POTASSIUM    4.2          3.7          2.7*          CHLORIDE     109          110*         104           CO2          25           24           28            BUN          11           11           12            CR           0.75         0.83         0.77          ANIONGAP     5            6             7             CHRISTIE          8.4*         8.5          8.3*          GLC          121*         117*         150*                 Anesthesia Plan      History & Physical Review  History and physical reviewed and following examination; no interval change.    ASA Status:  3 emergent.        Plan for General, ETT and RSI with Propofol induction. Maintenance will be Balanced.    PONV prophylaxis:  Ondansetron (or other 5HT-3) and Dexamethasone or Solumedrol       Postoperative Care  Postoperative pain management:  IV analgesics and Oral pain medications.      Consents  Anesthetic plan, risks, benefits and alternatives discussed with:  Patient.  Use of blood products discussed: Yes.   Use of blood products discussed with Patient.  Consented to blood products.  .                            .

## 2018-03-20 LAB — COPATH REPORT: NORMAL

## 2018-03-26 NOTE — DISCHARGE SUMMARY
Hospital Discharge Summary      Date of admission: 3/16/2018  Date of discharge: 3/16/2018    Admission Dx:   - Pelvic pain  - ovarian cyst    Discharge Dx:   - same s/p cystectoctomy    Hospital Procedures:   - EUA, dx laparoscopy, right ovarian cystectomy    Brief HPI:  Holli Thacker is 42 year old who was admitted to the hospital on 3/16/2018 for surgical management of pelvic pain through the ED.  She was evaluated the previous day in the ED was sent home with pain meds, due to the return to the ED and inability to control pain with meds we escalated the care and  and the risks, benefits and alternatives of surgical management were discussed in detail with the patient.  Please see her preoperative H&P for full details of her history.    Hospital Course:  On 3/16/2018, Holli Thacker underwent a Dx laparoscopy, right ovarian cystectomy.  The procedure was uncomplicated.  EBL from the procedure was 25cc.  Findings at the time of the procedure included on EUA normal anteverted uterus.  On Laparoscopy there was a normal upper abdomen. Normal appearing uterus, fallopian tubes s/p ligation.  The left ovary was only a remnant and the right ovary had a 3 cm cyst that was dangling of the distal end in the posterior cul-de-sac.  This appeared to be twisting and possible causing an intermittent torsion. For full details of the procedure, please see her operative note.      Her postoperative course was uncomplicated. By POD#0, she was ambulating without dizziness, tolerating a regular diet without nausea or vomiting, passing flatus, and her pain was well controlled on oral pain meds.  She requested discharge to home and was deemed appropriate and stable to do so.        Discharge instructions:    She was discharged to home on POD#0 with the following instructions:   - No lifting > 20 lbs x 6 weeks   - Nothing in the vagina x 1 weeks   - No driving while on narcotic meds or for 1 weeks   - Call the clinic if  you have a temp > 100.4, heavy vaginal bleeding, pain not controlled with oral pain meds, severe constipation, severe nausea or vomiting, or abnormal drainage from your incision or your vagina.    Discharge meds:  The patient was discharged to home with the following pain medications:   Holli Thomas   Home Medication Instructions AMBER:[id number]    Printed on:03/26/18 3162   Medication Information                      albuterol (2.5 MG/3ML) 0.083% nebulizer solution  Take 1 vial (2.5 mg) by nebulization every 4 hours as needed for shortness of breath / dyspnea or wheezing             Cholecalciferol (VITAMIN D3) 3000 UNITS TABS  Take 5,000 Units by mouth daily             cyclobenzaprine (FLEXERIL) 10 MG tablet  Take 10 mg by mouth At Bedtime              fluticasone (FLONASE) 50 MCG/ACT nasal spray  Spray 1 spray into both nostrils daily              fluticasone-salmeterol (ADVAIR) 500-50 MCG/DOSE diskus inhaler  Inhale 1 puff into the lungs 2 times daily             ibuprofen (ADVIL/MOTRIN) 200 MG tablet  Take 1-2 tablets (200-400 mg) by mouth every 6 hours as needed for other (mild pain)             ipratropium - albuterol 0.5 mg/2.5 mg/3 mL (DUONEB) 0.5-2.5 (3) MG/3ML nebulization  Take 1 vial (3 mLs) by nebulization 3 times daily             Montelukast Sodium (SINGULAIR PO)  Take 10 mg by mouth At Bedtime              nortriptyline (PAMELOR) 50 MG capsule  Take on capsule at [facility]             omalizumab (XOLAIR) 150 MG injection  Inject 150 mg Subcutaneous See Admin Instructions             oxyCODONE IR (ROXICODONE) 5 MG tablet  Take 1-2 tablets (5-10 mg) by mouth every 4 hours as needed for pain             oxyCODONE IR (ROXICODONE) 5 MG tablet  Take 1-2 tablets (5-10 mg) by mouth every 3 hours as needed for other (Moderate to Severe Pain)             pantoprazole (PROTONIX) 40 MG EC tablet  Take 40 mg by mouth every morning              Sertraline HCl (ZOLOFT PO)  Take 200 mg by mouth At Bedtime               tofacitinib (XELJANZ) 5 MG tablet  Take 5 mg by mouth 2 times daily             ZOLMitriptan (ZOMIG PO)  Take 5 mg by mouth at onset of headache for migraine             Zonisamide (ZONEGRAN PO)  Take 200 mg by mouth At Bedtime                  The patient was instructed to continue her home meds as previously prescribed by her primary MD.    Follow-up:  The patient was instructed to follow-up with Dr. Gold in 4 weeks for a routine post-operative visit.     Juice Gold

## 2018-04-05 ENCOUNTER — INFUSION THERAPY VISIT (OUTPATIENT)
Dept: INFUSION THERAPY | Facility: CLINIC | Age: 43
End: 2018-04-05
Attending: INTERNAL MEDICINE
Payer: COMMERCIAL

## 2018-04-05 VITALS — DIASTOLIC BLOOD PRESSURE: 61 MMHG | SYSTOLIC BLOOD PRESSURE: 104 MMHG

## 2018-04-05 DIAGNOSIS — L50.1 URTICARIA, IDIOPATHIC: Primary | ICD-10-CM

## 2018-04-05 PROCEDURE — 96372 THER/PROPH/DIAG INJ SC/IM: CPT

## 2018-04-05 PROCEDURE — 25000128 H RX IP 250 OP 636: Performed by: INTERNAL MEDICINE

## 2018-04-05 RX ADMIN — OMALIZUMAB 300 MG: 202.5 INJECTION, SOLUTION SUBCUTANEOUS at 11:19

## 2018-04-05 NOTE — MR AVS SNAPSHOT
After Visit Summary   4/5/2018    Holli Thacker    MRN: [id number]           Patient Information     Date Of Birth          1975        Visit Information        Provider Department      4/5/2018 11:00 AM RH INFUSION CHAIR 11 [facility]        Today's Diagnoses     Urticaria, idiopathic    -  1       Follow-ups after your visit        Your next 10 appointments already scheduled     Apr 26, 2018 11:00 AM CDT   Level 1 with RH INFUSION CHAIR 9   [facility] ([facility])    [facility]  [address]  [facility] [address]   [phone]            May 17, 2018 11:30 AM CDT   Level 1 with RH INFUSION CHAIR 11   [facility] ([facility])    [facility]  [address]  [facility] [address]   [phone]            Jun 07, 2018 11:00 AM CDT   Level 1 with RH INFUSION CHAIR 2   [facility] ([facility])    [facility]  [address]  [facility] [address]   [phone]              Who to contact     If you have questions or need follow up information about today's clinic visit or your schedule please contact [facility] directly at [phone].  Normal or non-critical lab and imaging results will be communicated to you by MyChart, letter or phone within 4 business days after the clinic has received the results. If you do not hear from us within 7 days, please contact the clinic through MyChart or phone. If you have a critical or abnormal lab result, we will notify you by phone as soon as possible.  Submit refill requests through [employer] [facility] [employer] [facility] or call your pharmacy and they will forward the refill request to us. Please allow 3 business days for your refill to be completed.           Additional Information About Your Visit        MyChart Information     [employer] gives you secure access to your electronic health record. If you see a primary care provider, you can also send messages to your care team and make appointments. If you have questions, please call your primary care clinic.  If you do not have a primary care provider, please call [phone] and they will assist you.        Care EveryWhere ID     This is your Care EveryWhere ID. This could be used by other organizations to access your [facility] medical records  [id number]         Blood Pressure from Last 3 Encounters:   04/05/18 104/61   03/17/18 103/56   03/15/18 93/45    Weight from Last 3 Encounters:   03/15/18 74.8 kg (165 lb)   11/29/17 83.3 kg (183 lb 9.6 oz)   03/10/17 90.7 kg (200 lb)              Today, you had the following     No orders found for display       Primary Care Provider Office Phone # Fax #    Hansa Boles [phone]       [place] [address]        Equal Access to Services     [facility] : Hadii candice ku hadasho Sofrankie, waaxda luqadaha, qaybta kaalmamecca schmidt, anaid nunes. So [facility] [phone].    ATENCIÓN: Si habla español, tiene a trinidad disposición servicios grat"[employer]os de asistencia lingüística. [facility] [phone].    We comply with applicable federal civil rights laws and [place] laws. We do not discriminate on the basis of race, color, national origin, age, disability, sex, sexual orientation, or gender identity.            Thank you!     Thank you for choosing [facility]  for your care. Our goal is always to provide you with excellent care. Hearing back from our patients is one way we can continue to improve our services. Please take a few minutes to complete the written survey that you may receive in the mail after your visit with us. Thank you!             Your Updated  Medication List - Protect others around you: Learn how to safely use, store and throw away your medicines at www.disposemymeds.org.          This list is accurate as of 4/5/18 11:56 AM.  Always use your most recent med list.                   Brand Name Dispense Instructions for use Diagnosis    albuterol (2.5 MG/3ML) 0.083% neb solution     1 Box    Take 1 vial (2.5 mg) by nebulization every 4 hours as needed for shortness of breath / dyspnea or wheezing        cyclobenzaprine 10 MG tablet    FLEXERIL     Take 10 mg by mouth At Bedtime        fluticasone 50 MCG/ACT spray    FLONASE     Spray 1 spray into both nostrils daily        fluticasone-salmeterol 500-50 MCG/DOSE diskus inhaler    ADVAIR     Inhale 1 puff into the lungs 2 times daily        ibuprofen 200 MG tablet    ADVIL/MOTRIN    30 tablet    Take 1-2 tablets (200-400 mg) by mouth every 6 hours as needed for other (mild pain)    Post-op pain       ipratropium - albuterol 0.5 mg/2.5 mg/3 mL 0.5-2.5 (3) MG/3ML neb solution    DUONEB    1 Box    Take 1 vial (3 mLs) by nebulization 3 times daily        nortriptyline 50 MG capsule    PAMELOR     Take on capsule at betime        omalizumab 150 MG injection    XOLAIR     Inject 150 mg Subcutaneous See Admin Instructions        * oxyCODONE IR 5 MG tablet    ROXICODONE    15 tablet    Take 1-2 tablets (5-10 mg) by mouth every 4 hours as needed for pain        * oxyCODONE IR 5 MG tablet    ROXICODONE    10 tablet    Take 1-2 tablets (5-10 mg) by mouth every 3 hours as needed for other (Moderate to Severe Pain)    Post-op pain       pantoprazole 40 MG EC tablet    PROTONIX     Take 40 mg by mouth every morning        SINGULAIR PO      Take 10 mg by mouth At Bedtime        Vitamin D3 3000 UNITS Tabs      Take 5,000 Units by mouth daily        XELJANZ 5 MG tablet   Generic drug:  tofacitinib      Take 5 mg by mouth 2 times daily        ZOLOFT PO      Take 200 mg by mouth At Bedtime        ZOMIG PO      Take 5 mg by  mouth at onset of headache for migraine        ZONEGRAN PO      Take 200 mg by mouth At Bedtime        * Notice:  This list has 2 medication(s) that are the same as other medications prescribed for you. Read the directions carefully, and ask your doctor or other care provider to review them with you.

## 2018-04-05 NOTE — PROGRESS NOTES
Infusion Nursing Note:  Holli Thacker presents today for Xolair.    Patient seen by provider today: No   present during visit today: Not Applicable.    Note: N/A.    Intravenous Access:  No Intravenous access/labs at this visit.    Treatment Conditions:  Not Applicable.      Post Infusion Assessment:  Patient tolerated injection without incident.  Patient observed for 30 minutes post Xolair per protocol.    Discharge Plan:   Discharge instructions reviewed with: Patient.  Patient and/or family verbalized understanding of discharge instructions and all questions answered.  Patient discharged in stable condition accompanied by: self.  Departure Mode: Ambulatory.    Althea Garduno RN

## 2018-04-23 ENCOUNTER — INFUSION THERAPY VISIT (OUTPATIENT)
Dept: INFUSION THERAPY | Facility: CLINIC | Age: 43
End: 2018-04-23
Attending: INTERNAL MEDICINE
Payer: COMMERCIAL

## 2018-04-23 VITALS — DIASTOLIC BLOOD PRESSURE: 68 MMHG | SYSTOLIC BLOOD PRESSURE: 113 MMHG

## 2018-04-23 DIAGNOSIS — L50.1 URTICARIA, IDIOPATHIC: Primary | ICD-10-CM

## 2018-04-23 PROCEDURE — 25000128 H RX IP 250 OP 636: Performed by: INTERNAL MEDICINE

## 2018-04-23 PROCEDURE — 96372 THER/PROPH/DIAG INJ SC/IM: CPT

## 2018-04-23 RX ADMIN — OMALIZUMAB 300 MG: 202.5 INJECTION, SOLUTION SUBCUTANEOUS at 11:42

## 2018-04-23 NOTE — PROGRESS NOTES
Infusion Nursing Note:  Holli Thacker presents today for Xolair.    Patient seen by provider today: No   present during visit today: Not Applicable.    Note: N/A.    Intravenous Access:  No Intravenous access/labs at this visit.    Treatment Conditions:  Not Applicable.      Post Infusion Assessment:  Patient tolerated injection without incident.  Patient observed for 30 minutes post Xolair per protocol.    Discharge Plan:   Discharge instructions reviewed with: Patient.  Patient and/or family verbalized understanding of discharge instructions and all questions answered.  AVS to patient via [email or web address].  Patient will return 5/17/18 for next appointment.   Patient discharged in stable condition accompanied by: self.  Departure Mode: Ambulatory.    Althea Garduno RN

## 2018-04-23 NOTE — MR AVS SNAPSHOT
After Visit Summary   4/23/2018    Holli Thacker    MRN: [id number]           Patient Information     Date Of Birth          1975        Visit Information        Provider Department      4/23/2018 11:30 AM RH INFUSION CHAIR 9 [facility]        Today's Diagnoses     Urticaria, idiopathic    -  1       Follow-ups after your visit        Your next 10 appointments already scheduled     May 17, 2018 11:30 AM CDT   Level 1 with RH INFUSION CHAIR 11   [facility] ([facility])    [facility]  [address]  [facility] [address]   [phone]            Jun 07, 2018 11:00 AM CDT   Level 1 with RH INFUSION CHAIR 2   [facility] ([facility])    [facility]  [address]  [facility] [id number] [phone]              Who to contact     If you have questions or need follow up information about today's clinic visit or your schedule please contact [facility] directly at [phone].  Normal or non-critical lab and imaging results will be communicated to you by [employer], letter or phone within 4 business days after the clinic has received the results. If you do not hear from us within 7 days, please contact the clinic through [employer] or phone. If you have a critical or abnormal lab result, we will notify you by phone as soon as possible.  Submit refill requests through [employer] or call your pharmacy and they will forward the refill request to us. Please allow 3 business days for your refill to be completed.          Additional Information About Your Visit        MyChart Information     [employer] gives you secure access to your electronic health record. If you see a primary care provider, you can also send messages to your care team and make appointments. If you have  questions, please call your primary care clinic.  If you do not have a primary care provider, please call [phone] and they will assist you.        Care EveryWhere ID     This is your Care EveryWhere ID. This could be used by other organizations to access your [place] medical records  [id number]         Blood Pressure from Last 3 Encounters:   04/23/18 113/68   04/05/18 104/61   03/17/18 103/56    Weight from Last 3 Encounters:   03/15/18 74.8 kg (165 lb)   11/29/17 83.3 kg (183 lb 9.6 oz)   03/10/17 90.7 kg (200 lb)              Today, you had the following     No orders found for display       Primary Care Provider Office Phone # Fax #    Hansa Boles [phone]       [place] [address] [address] [place] [id number]        Equal Access to Services     [facility]: Hadii aad ku hadasho Sofrankie, waaxda luqadaha, qaybta kaalmada adeegyada, anaid reyes . So [facility] [phone].    ATENCIÓN: Si habla español, tiene a trinidad disposición servicios gratuitos de asistencia lingüística. [facility] [phone].    We comply with applicable federal civil rights laws and [place] laws. We do not discriminate on the basis of race, color, national origin, age, disability, sex, sexual orientation, or gender identity.            Thank you!     Thank you for choosing [facility]  for your care. Our goal is always to provide you with excellent care. Hearing back from our patients is one way we can continue to improve our services. Please take a few minutes to complete the written survey that you may receive in the mail after your visit with us. Thank you!             Your Updated Medication List - Protect others around you: Learn how to safely use, store and throw away your medicines at www.disposemymeds.org.          This list is accurate as of 4/23/18 12:59 PM.  Always use your most recent med list.                   Brand Name  Dispense Instructions for use Diagnosis    albuterol (2.5 MG/3ML) 0.083% neb solution     1 Box    Take 1 vial (2.5 mg) by nebulization every 4 hours as needed for shortness of breath / dyspnea or wheezing        cyclobenzaprine 10 MG tablet    FLEXERIL     Take 10 mg by mouth At Bedtime        fluticasone 50 MCG/ACT spray    FLONASE     Spray 1 spray into both nostrils daily        fluticasone-salmeterol 500-50 MCG/DOSE diskus inhaler    ADVAIR     Inhale 1 puff into the lungs 2 times daily        ibuprofen 200 MG tablet    ADVIL/MOTRIN    30 tablet    Take 1-2 tablets (200-400 mg) by mouth every 6 hours as needed for other (mild pain)    Post-op pain       ipratropium - albuterol 0.5 mg/2.5 mg/3 mL 0.5-2.5 (3) MG/3ML neb solution    DUONEB    1 Box    Take 1 vial (3 mLs) by nebulization 3 times daily        nortriptyline 50 MG capsule    PAMELOR     Take on capsule at betime        omalizumab 150 MG injection    XOLAIR     Inject 150 mg Subcutaneous See Admin Instructions        * oxyCODONE IR 5 MG tablet    ROXICODONE    15 tablet    Take 1-2 tablets (5-10 mg) by mouth every 4 hours as needed for pain        * oxyCODONE IR 5 MG tablet    ROXICODONE    10 tablet    Take 1-2 tablets (5-10 mg) by mouth every 3 hours as needed for other (Moderate to Severe Pain)    Post-op pain       pantoprazole 40 MG EC tablet    PROTONIX     Take 40 mg by mouth every morning        SINGULAIR PO      Take 10 mg by mouth At Bedtime        Vitamin D3 3000 units Tabs      Take 5,000 Units by mouth daily        XELJANZ 5 MG tablet   Generic drug:  tofacitinib      Take 5 mg by mouth 2 times daily        ZOLOFT PO      Take 200 mg by mouth At Bedtime        ZOMIG PO      Take 5 mg by mouth at onset of headache for migraine        ZONEGRAN PO      Take 200 mg by mouth At Bedtime        * Notice:  This list has 2 medication(s) that are the same as other medications prescribed for you. Read the directions carefully, and ask your doctor or  other care provider to review them with you.

## 2018-05-14 ENCOUNTER — MEDICAL CORRESPONDENCE (OUTPATIENT)
Dept: HEALTH INFORMATION MANAGEMENT | Facility: CLINIC | Age: 43
End: 2018-05-14

## 2018-05-14 DIAGNOSIS — M62.838 SPASM OF MUSCLE: Primary | ICD-10-CM

## 2018-05-14 PROBLEM — G43.109 CLASSICAL MIGRAINE: Status: ACTIVE | Noted: 2018-05-14

## 2018-05-14 PROBLEM — G43.711 CHRONIC MIGRAINE WITHOUT AURA, WITH INTRACTABLE MIGRAINE, SO STATED, WITH STATUS MIGRAINOSUS: Status: ACTIVE | Noted: 2018-05-14

## 2018-05-14 RX ORDER — LORAZEPAM 2 MG/ML
1 INJECTION INTRAMUSCULAR
Status: CANCELLED
Start: 2018-05-15

## 2018-05-14 RX ORDER — KETOROLAC TROMETHAMINE 15 MG/ML
30 INJECTION, SOLUTION INTRAMUSCULAR; INTRAVENOUS ONCE
Status: CANCELLED
Start: 2018-05-15 | End: 2018-05-15

## 2018-05-14 RX ORDER — ONDANSETRON 2 MG/ML
4-8 INJECTION INTRAMUSCULAR; INTRAVENOUS ONCE
Status: CANCELLED
Start: 2018-05-15 | End: 2018-05-15

## 2018-05-14 RX ORDER — METHYLPREDNISOLONE SODIUM SUCCINATE 125 MG/2ML
100 INJECTION, POWDER, LYOPHILIZED, FOR SOLUTION INTRAMUSCULAR; INTRAVENOUS ONCE
Status: CANCELLED
Start: 2018-05-15 | End: 2018-05-15

## 2018-05-18 ENCOUNTER — INFUSION THERAPY VISIT (OUTPATIENT)
Dept: INFUSION THERAPY | Facility: CLINIC | Age: 43
End: 2018-05-18
Attending: INTERNAL MEDICINE
Payer: COMMERCIAL

## 2018-05-18 VITALS
OXYGEN SATURATION: 96 % | DIASTOLIC BLOOD PRESSURE: 67 MMHG | TEMPERATURE: 98 F | SYSTOLIC BLOOD PRESSURE: 110 MMHG | RESPIRATION RATE: 16 BRPM

## 2018-05-18 DIAGNOSIS — L50.1 URTICARIA, IDIOPATHIC: Primary | ICD-10-CM

## 2018-05-18 PROCEDURE — 96372 THER/PROPH/DIAG INJ SC/IM: CPT

## 2018-05-18 PROCEDURE — 25000128 H RX IP 250 OP 636: Performed by: INTERNAL MEDICINE

## 2018-05-18 RX ADMIN — OMALIZUMAB 300 MG: 202.5 INJECTION, SOLUTION SUBCUTANEOUS at 13:40

## 2018-05-18 NOTE — MR AVS SNAPSHOT
After Visit Summary   5/18/2018    Holli Thacker    MRN: [id number]           Patient Information     Date Of Birth          1975        Visit Information        Provider Department      5/18/2018 1:30 PM RH INFUSION CHAIR 12 [facility]        Today's Diagnoses     Urticaria, idiopathic    -  1       Follow-ups after your visit        Your next 10 appointments already scheduled     Jun 07, 2018 11:00 AM CDT   Level 1 with RH INFUSION CHAIR 8   [facility] ([facility])    [facility]  [address]  [facility] [id number] [phone]              Who to contact     If you have questions or need follow up information about today's clinic visit or your schedule please contact [facility] INFUSION SERVICES directly at [phone].  Normal or non-critical lab and imaging results will be communicated to you by MyChart, letter or phone within 4 business days after the clinic has received the results. If you do not hear from us within 7 days, please contact the clinic through MyChart or phone. If you have a critical or abnormal lab result, we will notify you by phone as soon as possible.  Submit refill requests through [employer] or call your pharmacy and they will forward the refill request to us. Please allow 3 business days for your refill to be completed.          Additional Information About Your Visit        MyChart Information     [employer] gives you secure access to your electronic health record. If you see a primary care provider, you can also send messages to your care team and make appointments. If you have questions, please call your primary care clinic.  If you do not have a primary care provider, please call [phone] and they will assist you.        Care EveryWhere ID     This is your Care EveryWhere ID. This could be used by other organizations to  access your [place] medical records  [id number]        Your Vitals Were     Temperature Respirations Pulse Oximetry             98  F (36.7  C) (Tympanic) 16 96%          Blood Pressure from Last 3 Encounters:   05/18/18 110/67   04/23/18 113/68   04/05/18 104/61    Weight from Last 3 Encounters:   03/15/18 74.8 kg (165 lb)   11/29/17 83.3 kg (183 lb 9.6 oz)   03/10/17 90.7 kg (200 lb)              Today, you had the following     No orders found for display       Primary Care Provider Office Phone # Fax #    Hansa Boles [phone]       [place] [address] [address] [place] [address]        Equal Access to Services     SHANTA KAT : Toni guerra Sofrankie, waaxmecca luqadaha, qaybta kaalmada adeegyada, anaid nunes. So [facility] [phone].    ATENCIÓN: Si habla español, tiene a trinidad disposición servicios gratuitos de asistencia lingüística. [facility] [phone].    We comply with applicable federal civil rights laws and [place] laws. We do not discriminate on the basis of race, color, national origin, age, disability, sex, sexual orientation, or gender identity.            Thank you!     Thank you for choosing [facility] INFUSION [facility]  for your care. Our goal is always to provide you with excellent care. Hearing back from our patients is one way we can continue to improve our services. Please take a few minutes to complete the written survey that you may receive in the mail after your visit with us. Thank you!             Your Updated Medication List - Protect others around you: Learn how to safely use, store and throw away your medicines at www.disposemymeds.org.          This list is accurate as of 5/18/18  1:52 PM.  Always use your most recent med list.                   Brand Name Dispense Instructions for use Diagnosis    albuterol (2.5 MG/3ML) 0.083% neb solution     1 Box    Take 1 vial (2.5 mg) by nebulization  every 4 hours as needed for shortness of breath / dyspnea or wheezing        cyclobenzaprine 10 MG tablet    FLEXERIL     Take 10 mg by mouth At Bedtime        fluticasone 50 MCG/ACT spray    FLONASE     Spray 1 spray into both nostrils daily        fluticasone-salmeterol 500-50 MCG/DOSE diskus inhaler    ADVAIR     Inhale 1 puff into the lungs 2 times daily        ibuprofen 200 MG tablet    ADVIL/MOTRIN    30 tablet    Take 1-2 tablets (200-400 mg) by mouth every 6 hours as needed for other (mild pain)    Post-op pain       ipratropium - albuterol 0.5 mg/2.5 mg/3 mL 0.5-2.5 (3) MG/3ML neb solution    DUONEB    1 Box    Take 1 vial (3 mLs) by nebulization 3 times daily        nortriptyline 50 MG capsule    PAMELOR     Take on capsule at betime        omalizumab 150 MG injection    XOLAIR     Inject 150 mg Subcutaneous See Admin Instructions        * oxyCODONE IR 5 MG tablet    ROXICODONE    15 tablet    Take 1-2 tablets (5-10 mg) by mouth every 4 hours as needed for pain        * oxyCODONE IR 5 MG tablet    ROXICODONE    10 tablet    Take 1-2 tablets (5-10 mg) by mouth every 3 hours as needed for other (Moderate to Severe Pain)    Post-op pain       pantoprazole 40 MG EC tablet    PROTONIX     Take 40 mg by mouth every morning        SINGULAIR PO      Take 10 mg by mouth At Bedtime        Vitamin D3 3000 units Tabs      Take 5,000 Units by mouth daily        XELJANZ 5 MG tablet   Generic drug:  tofacitinib      Take 5 mg by mouth 2 times daily        ZOLOFT PO      Take 200 mg by mouth At Bedtime        ZOMIG PO      Take 5 mg by mouth at onset of headache for migraine        ZONEGRAN PO      Take 200 mg by mouth At Bedtime        * Notice:  This list has 2 medication(s) that are the same as other medications prescribed for you. Read the directions carefully, and ask your doctor or other care provider to review them with you.

## 2018-05-18 NOTE — PROGRESS NOTES
Infusion Nursing Note:  Holli ROOSEVELT Linoey Kedar presents today for Xolair.    Patient seen by provider today: No   present during visit today: Not Applicable.    Note: N/A.    Intravenous Access:  No Intravenous access/labs at this visit.    Treatment Conditions:  Not Applicable.      Post Infusion Assessment:  Patient tolerated injection without incident.  Patient observed for 30 minutes post Xolair per protocol.  Blood return noted pre and post infusion.  Site patent and intact, free from redness, edema or discomfort.  No evidence of extravasations.  Access discontinued per protocol.    Discharge Plan:   Discharge instructions reviewed with: Patient.  Patient and/or family verbalized understanding of discharge instructions and all questions answered.  AVS to patient via [employer].  Patient will return 6/7/18 for next appointment.   Patient discharged in stable condition accompanied by: daughter.  Departure Mode: Ambulatory.    Cheryl Billings RN

## 2018-06-14 ENCOUNTER — INFUSION THERAPY VISIT (OUTPATIENT)
Dept: INFUSION THERAPY | Facility: CLINIC | Age: 43
End: 2018-06-14
Attending: INTERNAL MEDICINE
Payer: COMMERCIAL

## 2018-06-14 VITALS — DIASTOLIC BLOOD PRESSURE: 67 MMHG | TEMPERATURE: 99.3 F | HEART RATE: 92 BPM | SYSTOLIC BLOOD PRESSURE: 125 MMHG

## 2018-06-14 DIAGNOSIS — L50.1 URTICARIA, IDIOPATHIC: Primary | ICD-10-CM

## 2018-06-14 PROCEDURE — 96372 THER/PROPH/DIAG INJ SC/IM: CPT

## 2018-06-14 PROCEDURE — 25000128 H RX IP 250 OP 636: Performed by: INTERNAL MEDICINE

## 2018-06-14 RX ADMIN — OMALIZUMAB 300 MG: 202.5 INJECTION, SOLUTION SUBCUTANEOUS at 14:36

## 2018-06-14 NOTE — MR AVS SNAPSHOT
After Visit Summary   6/14/2018    Holli Thacker    MRN: [id number]           Patient Information     Date Of Birth          1975        Visit Information        Provider Department      6/14/2018 2:30 PM RH INFUSION CHAIR 12 [facility]        Today's Diagnoses     Urticaria, idiopathic    -  1       Follow-ups after your visit        Who to contact     If you have questions or need follow up information about today's clinic visit or your schedule please contact [facility] INFUSION SERVICES directly at [phone].  Normal or non-critical lab and imaging results will be communicated to you by Ipsumhart, letter or phone within 4 business days after the clinic has received the results. If you do not hear from us within 7 days, please contact the clinic through [employer] or phone. If you have a critical or abnormal lab result, we will notify you by phone as soon as possible.  Submit refill requests through [employer] or call your pharmacy and they will forward the refill request to us. Please allow 3 business days for your refill to be completed.          Additional Information About Your Visit        MyChart Information     [employer] gives you secure access to your electronic health record. If you see a primary care provider, you can also send messages to your care team and make appointments. If you have questions, please call your primary care clinic.  If you do not have a primary care provider, please call [phone] and they will assist you.        Care EveryWhere ID     This is your Care EveryWhere ID. This could be used by other organizations to access your [place] medical records  [id number]        Your Vitals Were     Pulse Temperature                92 99.3  F (37.4  C) (Tympanic)           Blood Pressure from Last 3 Encounters:   06/14/18 125/67   05/18/18 110/67   04/23/18 113/68    Weight from Last 3 Encounters:   03/15/18 74.8 kg  (165 lb)   11/29/17 83.3 kg (183 lb 9.6 oz)   03/10/17 90.7 kg (200 lb)              Today, you had the following     No orders found for display       Primary Care Provider Office Phone # Fax #    Hansa Boles [phone]       [facility] [address] [facility] [address]        Equal Access to Services     ROBERT KAT : Hadii aad ku hadasho Soomaali, waaxda luqadaha, qaybta kaalmada adeegyada, waxay idiin hayaan adeeg khroseannesh la'collinn ah. So [facility] [phone].    ATENCIÓN: Si jaylyn mckeon, tiene a trinidad disposición servicios gratuitos de asistencia lingüística. [facility] [phone].    We comply with applicable federal civil rights laws and [place] laws. We do not discriminate on the basis of race, color, national origin, age, disability, sex, sexual orientation, or gender identity.            Thank you!     Thank you for choosing [facility] INFUSION SERVICES  for your care. Our goal is always to provide you with excellent care. Hearing back from our patients is one way we can continue to improve our services. Please take a few minutes to complete the written survey that you may receive in the mail after your visit with us. Thank you!             Your Updated Medication List - Protect others around you: Learn how to safely use, store and throw away your medicines at www.disposemymeds.org.          This list is accurate as of 6/14/18  3:03 PM.  Always use your most recent med list.                   Brand Name Dispense Instructions for use Diagnosis    albuterol (2.5 MG/3ML) 0.083% neb solution     1 Box    Take 1 vial (2.5 mg) by nebulization every 4 hours as needed for shortness of breath / dyspnea or wheezing        cyclobenzaprine 10 MG tablet    FLEXERIL     Take 10 mg by mouth At Bedtime        fluticasone 50 MCG/ACT spray    FLONASE     Spray 1 spray into both nostrils daily        fluticasone-salmeterol 500-50 MCG/DOSE diskus inhaler    ADVAIR      Inhale 1 puff into the lungs 2 times daily        ibuprofen 200 MG tablet    ADVIL/MOTRIN    30 tablet    Take 1-2 tablets (200-400 mg) by mouth every 6 hours as needed for other (mild pain)    Post-op pain       ipratropium - albuterol 0.5 mg/2.5 mg/3 mL 0.5-2.5 (3) MG/3ML neb solution    DUONEB    1 Box    Take 1 vial (3 mLs) by nebulization 3 times daily        nortriptyline 50 MG capsule    PAMELOR     Take on capsule at betime        omalizumab 150 MG injection    XOLAIR     Inject 150 mg Subcutaneous See Admin Instructions        * oxyCODONE IR 5 MG tablet    ROXICODONE    15 tablet    Take 1-2 tablets (5-10 mg) by mouth every 4 hours as needed for pain        * oxyCODONE IR 5 MG tablet    ROXICODONE    10 tablet    Take 1-2 tablets (5-10 mg) by mouth every 3 hours as needed for other (Moderate to Severe Pain)    Post-op pain       pantoprazole 40 MG EC tablet    PROTONIX     Take 40 mg by mouth every morning        SINGULAIR PO      Take 10 mg by mouth At Bedtime        Vitamin D3 3000 units Tabs      Take 5,000 Units by mouth daily        XELJANZ 5 MG tablet   Generic drug:  tofacitinib      Take 5 mg by mouth 2 times daily        ZOLOFT PO      Take 200 mg by mouth At Bedtime        ZOMIG PO      Take 5 mg by mouth at onset of headache for migraine        ZONEGRAN PO      Take 200 mg by mouth At Bedtime        * Notice:  This list has 2 medication(s) that are the same as other medications prescribed for you. Read the directions carefully, and ask your doctor or other care provider to review them with you.

## 2018-06-14 NOTE — PROGRESS NOTES
Infusion Nursing Note:  Holli ROOSEVELT Linoey Kedar presents today for Xolair.    Patient seen by provider today: No   present during visit today: Not Applicable.    Note: N/A.    Intravenous Access:  No Intravenous access/labs at this visit.    Treatment Conditions:  Not Applicable.      Post Infusion Assessment:  Patient tolerated injection without incident.  Patient observed for 30 minutes post Xolari per protocol.  Site patent and intact, free from redness, edema or discomfort.    Discharge Plan:   AVS to patient via MYCHART.  Patient will return in three weeks for next Xolair injection.  Pt will need to schedule this appt.   Patient discharged in stable condition accompanied by: self.  Departure Mode: Ambulatory.    Mercedes Lane RN

## 2018-07-17 ENCOUNTER — INFUSION THERAPY VISIT (OUTPATIENT)
Dept: INFUSION THERAPY | Facility: CLINIC | Age: 43
End: 2018-07-17
Attending: INTERNAL MEDICINE
Payer: COMMERCIAL

## 2018-07-17 VITALS
OXYGEN SATURATION: 96 % | TEMPERATURE: 98.8 F | HEART RATE: 90 BPM | RESPIRATION RATE: 16 BRPM | SYSTOLIC BLOOD PRESSURE: 122 MMHG | DIASTOLIC BLOOD PRESSURE: 70 MMHG

## 2018-07-17 DIAGNOSIS — L50.1 URTICARIA, IDIOPATHIC: Primary | ICD-10-CM

## 2018-07-17 PROCEDURE — 96372 THER/PROPH/DIAG INJ SC/IM: CPT

## 2018-07-17 PROCEDURE — 25000128 H RX IP 250 OP 636: Performed by: INTERNAL MEDICINE

## 2018-07-17 RX ADMIN — OMALIZUMAB 300 MG: 202.5 INJECTION, SOLUTION SUBCUTANEOUS at 15:11

## 2018-07-17 NOTE — PROGRESS NOTES
Infusion Nursing Note:  Holli Thacker presents today for xolair.    Patient seen by provider today: No   present during visit today: Not Applicable.    Note: One injection in each arm.    Intravenous Access:  No Intravenous access/labs at this visit.    Treatment Conditions:  Not Applicable.      Post Infusion Assessment:  Patient tolerated injection without incident.  Patient observed for 30 minutes post xolair per protocol.    Discharge Plan:   Patient declined prescription refills.  Discharge instructions reviewed with: Patient.  Patient and/or family verbalized understanding of discharge instructions and all questions answered.  Patient discharged in stable condition accompanied by: self.  Departure Mode: Ambulatory.    Chanell Hollingsworth RN

## 2018-08-08 ENCOUNTER — INFUSION THERAPY VISIT (OUTPATIENT)
Dept: INFUSION THERAPY | Facility: CLINIC | Age: 43
End: 2018-08-08
Attending: INTERNAL MEDICINE
Payer: COMMERCIAL

## 2018-08-08 VITALS
TEMPERATURE: 98.6 F | HEART RATE: 87 BPM | RESPIRATION RATE: 16 BRPM | SYSTOLIC BLOOD PRESSURE: 110 MMHG | DIASTOLIC BLOOD PRESSURE: 57 MMHG

## 2018-08-08 DIAGNOSIS — L50.1 URTICARIA, IDIOPATHIC: Primary | ICD-10-CM

## 2018-08-08 PROCEDURE — 25000128 H RX IP 250 OP 636: Performed by: INTERNAL MEDICINE

## 2018-08-08 PROCEDURE — 96372 THER/PROPH/DIAG INJ SC/IM: CPT

## 2018-08-08 RX ADMIN — OMALIZUMAB 300 MG: 202.5 INJECTION, SOLUTION SUBCUTANEOUS at 11:36

## 2018-08-08 NOTE — PROGRESS NOTES
Infusion Nursing Note:  Holli Thacker presents today for Xolair.    Patient seen by provider today: No   present during visit today: Not Applicable.    Note: N/A.    Intravenous Access:  No Intravenous access/labs at this visit.    Treatment Conditions:  Not Applicable.      Post Infusion Assessment:  Patient tolerated injection without incident.  Patient observed for 30 minutes post Xolair per protocol.    Discharge Plan:   Discharge instructions reviewed with: Patient.  Patient and/or family verbalized understanding of discharge instructions and all questions answered.  AVS to patient via [employer]  Patient will return 8/29/18 for next appointment.   Patient discharged in stable condition accompanied by: self.  Departure Mode: Ambulatory.    Althea Garduno RN

## 2018-08-08 NOTE — MR AVS SNAPSHOT
After Visit Summary   8/8/2018    Holli Thacker    MRN: [id number]           Patient Information     Date Of Birth          1975        Visit Information        Provider Department      8/8/2018 11:30 AM RH INFUSION CHAIR 6 [facility]        Today's Diagnoses     Urticaria, idiopathic    -  1       Follow-ups after your visit        Your next 10 appointments already scheduled     Aug 29, 2018  1:00 PM CDT   Level 1 with RH INFUSION CHAIR 6   [facility] ([facility])    [facility]  [address]  [facility] [address]   [phone]            Sep 19, 2018  1:00 PM CDT   Level 1 with RH INFUSION CHAIR 9   [facility] ([facility])    [facility]  [address]  [facility] [id number] [phone]              Who to contact     If you have questions or need follow up information about today's clinic visit or your schedule please contact [facility] INFUSION SERVICES directly at [phone].  Normal or non-critical lab and imaging results will be communicated to you by JDLabhart, letter or phone within 4 business days after the clinic has received the results. If you do not hear from us within 7 days, please contact the clinic through JDLabhart or phone. If you have a critical or abnormal lab result, we will notify you by phone as soon as possible.  Submit refill requests through [employer] or call your pharmacy and they will forward the refill request to us. Please allow 3 business days for your refill to be completed.          Additional Information About Your Visit        MyChart Information     [employer] gives you secure access to your electronic health record. If you see a primary care provider, you can also send messages to your care team and make appointments. If you have  questions, please call your primary care clinic.  If you do not have a primary care provider, please call [phone] and they will assist you.        Care EveryWhere ID     This is your Care EveryWhere ID. This could be used by other organizations to access your [place] medical records  [id number]        Your Vitals Were     Pulse Temperature Respirations             87 98.6  F (37  C) (Tympanic) 16          Blood Pressure from Last 3 Encounters:   08/08/18 110/57   07/17/18 122/70   06/14/18 125/67    Weight from Last 3 Encounters:   03/15/18 74.8 kg (165 lb)   11/29/17 83.3 kg (183 lb 9.6 oz)   03/10/17 90.7 kg (200 lb)              Today, you had the following     No orders found for display       Primary Care Provider Office Phone # Fax #    Hansa Boles [phone]       [place] [address]        Equal Access to Services     SHANTA KAT : Hadii aad ku hadasho Soomaali, waaxda luqadaha, qaybta kaalmada adeegyada, waxay joanain hayronny reyes . So [facility] [phone].    ATENCIÓN: Si habla español, tiene a trinidad disposición servicios gratuitos de asistencia lingüística. LlK[facility] [phone].    We comply with applicable federal civil rights laws and [place] laws. We do not discriminate on the basis of race, color, national origin, age, disability, sex, sexual orientation, or gender identity.            Thank you!     Thank you for choosing [facility]  for your care. Our goal is always to provide you with excellent care. Hearing back from our patients is one way we can continue to improve our services. Please take a few minutes to complete the written survey that you may receive in the mail after your visit with us. Thank you!             Your Updated Medication List - Protect others around you: Learn how to safely use, store and throw away your medicines at www.disposemymeds.org.          This list  is accurate as of 8/8/18 11:44 AM.  Always use your most recent med list.                   Brand Name Dispense Instructions for use Diagnosis    albuterol (2.5 MG/3ML) 0.083% neb solution     1 Box    Take 1 vial (2.5 mg) by nebulization every 4 hours as needed for shortness of breath / dyspnea or wheezing        cyclobenzaprine 10 MG tablet    FLEXERIL     Take 10 mg by mouth At Bedtime        fluticasone 50 MCG/ACT spray    FLONASE     Spray 1 spray into both nostrils daily        fluticasone-salmeterol 500-50 MCG/DOSE diskus inhaler    ADVAIR     Inhale 1 puff into the lungs 2 times daily        ibuprofen 200 MG tablet    ADVIL/MOTRIN    30 tablet    Take 1-2 tablets (200-400 mg) by mouth every 6 hours as needed for other (mild pain)    Post-op pain       ipratropium - albuterol 0.5 mg/2.5 mg/3 mL 0.5-2.5 (3) MG/3ML neb solution    DUONEB    1 Box    Take 1 vial (3 mLs) by nebulization 3 times daily        nortriptyline 50 MG capsule    PAMELOR     Take on capsule at betime        omalizumab 150 MG injection    XOLAIR     Inject 150 mg Subcutaneous See Admin Instructions        * oxyCODONE IR 5 MG tablet    ROXICODONE    15 tablet    Take 1-2 tablets (5-10 mg) by mouth every 4 hours as needed for pain        * oxyCODONE IR 5 MG tablet    ROXICODONE    10 tablet    Take 1-2 tablets (5-10 mg) by mouth every 3 hours as needed for other (Moderate to Severe Pain)    Post-op pain       pantoprazole 40 MG EC tablet    PROTONIX     Take 40 mg by mouth every morning        SINGULAIR PO      Take 10 mg by mouth At Bedtime        Vitamin D3 3000 units Tabs      Take 5,000 Units by mouth daily        XELJANZ 5 MG tablet   Generic drug:  tofacitinib      Take 5 mg by mouth 2 times daily        ZOLOFT PO      Take 200 mg by mouth At Bedtime        ZOMIG PO      Take 5 mg by mouth at onset of headache for migraine        ZONEGRAN PO      Take 200 mg by mouth At Bedtime        * Notice:  This list has 2 medication(s) that are  the same as other medications prescribed for you. Read the directions carefully, and ask your doctor or other care provider to review them with you.

## 2018-08-29 ENCOUNTER — INFUSION THERAPY VISIT (OUTPATIENT)
Dept: INFUSION THERAPY | Facility: CLINIC | Age: 43
End: 2018-08-29
Attending: ALLERGY & IMMUNOLOGY
Payer: COMMERCIAL

## 2018-08-29 VITALS
DIASTOLIC BLOOD PRESSURE: 56 MMHG | HEART RATE: 89 BPM | OXYGEN SATURATION: 97 % | SYSTOLIC BLOOD PRESSURE: 123 MMHG | RESPIRATION RATE: 16 BRPM | TEMPERATURE: 98.3 F

## 2018-08-29 DIAGNOSIS — L50.1 URTICARIA, IDIOPATHIC: Primary | ICD-10-CM

## 2018-08-29 PROCEDURE — 25000128 H RX IP 250 OP 636: Performed by: INTERNAL MEDICINE

## 2018-08-29 PROCEDURE — 96372 THER/PROPH/DIAG INJ SC/IM: CPT

## 2018-08-29 RX ADMIN — OMALIZUMAB 300 MG: 202.5 INJECTION, SOLUTION SUBCUTANEOUS at 13:12

## 2018-08-29 NOTE — PROGRESS NOTES
Infusion Nursing Note:  Holli ROOSEVELT Thacker presents today for Xolair.    Patient seen by provider today: No   present during visit today: Not Applicable.    Note: N/A.    Intravenous Access:  No Intravenous access/labs at this visit.    Treatment Conditions:  Not Applicable.      Post Infusion Assessment:  Patient tolerated injection without incident.  Patient observed for 30 minutes post Xolair per protocol.    Discharge Plan:   Discharge instructions reviewed with: Patient.  Patient and/or family verbalized understanding of discharge instructions and all questions answered.  AVS to patient via [employer].  Patient will return 9/19/18 for next appointment.   Patient discharged in stable condition accompanied by: self.  Departure Mode: Ambulatory.    Cheryl Billings RN

## 2018-08-29 NOTE — MR AVS SNAPSHOT
After Visit Summary   8/29/2018    Holli Thacker    MRN: [id number]           Patient Information     Date Of Birth          1975        Visit Information        Provider Department      8/29/2018 1:00 PM RH INFUSION CHAIR 6 [facility] Infusion [facility]        Today's Diagnoses     Urticaria, idiopathic    -  1       Follow-ups after your visit        Your next 10 appointments already scheduled     Sep 19, 2018  1:00 PM CDT   Level 1 with RH INFUSION CHAIR 9   [facility] Infusion Services ([facility])    [facility]  [address]  [facility] [id number] [phone]              Who to contact     If you have questions or need follow up information about today's clinic visit or your schedule please contact [facility] INFUSION SERVICES directly at [phone].  Normal or non-critical lab and imaging results will be communicated to you by MyChart, letter or phone within 4 business days after the clinic has received the results. If you do not hear from us within 7 days, please contact the clinic through Spacenethart or phone. If you have a critical or abnormal lab result, we will notify you by phone as soon as possible.  Submit refill requests through "[employer] or call your pharmacy and they will forward the refill request to us. Please allow 3 business days for your refill to be completed.          Additional Information About Your Visit        MyChart Information     "[employer] gives you secure access to your electronic health record. If you see a primary care provider, you can also send messages to your care team and make appointments. If you have questions, please call your primary care clinic.  If you do not have a primary care provider, please call [phone] and they will assist you.        Care EveryWhere ID     This is your Care EveryWhere ID. This could be used by other organizations to  access your [place] medical records  [id number]        Your Vitals Were     Pulse Temperature Respirations Pulse Oximetry          89 98.3  F (36.8  C) (Tympanic) 16 97%         Blood Pressure from Last 3 Encounters:   08/29/18 123/56   08/08/18 110/57   07/17/18 122/70    Weight from Last 3 Encounters:   03/15/18 74.8 kg (165 lb)   11/29/17 83.3 kg (183 lb 9.6 oz)   03/10/17 90.7 kg (200 lb)              Today, you had the following     No orders found for display       Primary Care Provider Office Phone # Fax #    Hansa Boles [phone]       [place] [address]        Equal Access to Services     SHANTA KAT : Toni guerra Sofrankie, waaxda luqadaha, qaybta kaalmada adeegyada, anaid nunes. So [facility] [phone].    ATENCIÓN: Si habla español, tiene a trinidad disposición servicios gratuitos de asistencia lingüística. [facility] [phone].    We comply with applicable federal civil rights laws and [place] laws. We do not discriminate on the basis of race, color, national origin, age, disability, sex, sexual orientation, or gender identity.            Thank you!     Thank you for choosing [facility] INFUSION SERVICES  for your care. Our goal is always to provide you with excellent care. Hearing back from our patients is one way we can continue to improve our services. Please take a few minutes to complete the written survey that you may receive in the mail after your visit with us. Thank you!             Your Updated Medication List - Protect others around you: Learn how to safely use, store and throw away your medicines at www.disposemymeds.org.          This list is accurate as of 8/29/18  2:28 PM.  Always use your most recent med list.                   Brand Name Dispense Instructions for use Diagnosis    AIMOVIG 70 MG/ML injection   Generic drug:  erenumab-aooe      Inject 70 mg Subcutaneous  every 30 days        albuterol (2.5 MG/3ML) 0.083% neb solution     1 Box    Take 1 vial (2.5 mg) by nebulization every 4 hours as needed for shortness of breath / dyspnea or wheezing        cyclobenzaprine 10 MG tablet    FLEXERIL     Take 10 mg by mouth At Bedtime        fluticasone 50 MCG/ACT spray    FLONASE     Spray 1 spray into both nostrils daily        fluticasone-salmeterol 500-50 MCG/DOSE diskus inhaler    ADVAIR     Inhale 1 puff into the lungs 2 times daily        ibuprofen 200 MG tablet    ADVIL/MOTRIN    30 tablet    Take 1-2 tablets (200-400 mg) by mouth every 6 hours as needed for other (mild pain)    Post-op pain       ipratropium - albuterol 0.5 mg/2.5 mg/3 mL 0.5-2.5 (3) MG/3ML neb solution    DUONEB    1 Box    Take 1 vial (3 mLs) by nebulization 3 times daily        nortriptyline 50 MG capsule    PAMELOR     Take on capsule at betime        omalizumab 150 MG injection    XOLAIR     Inject 150 mg Subcutaneous See Admin Instructions        * oxyCODONE IR 5 MG tablet    ROXICODONE    15 tablet    Take 1-2 tablets (5-10 mg) by mouth every 4 hours as needed for pain        * oxyCODONE IR 5 MG tablet    ROXICODONE    10 tablet    Take 1-2 tablets (5-10 mg) by mouth every 3 hours as needed for other (Moderate to Severe Pain)    Post-op pain       pantoprazole 40 MG EC tablet    PROTONIX     Take 40 mg by mouth every morning        SINGULAIR PO      Take 10 mg by mouth At Bedtime        Vitamin D3 3000 units Tabs      Take 5,000 Units by mouth daily        XELJANZ 5 MG tablet   Generic drug:  tofacitinib      Take 5 mg by mouth 2 times daily        ZOLOFT PO      Take 200 mg by mouth At Bedtime        ZOMIG PO      Take 5 mg by mouth at onset of headache for migraine        ZONEGRAN PO      Take 200 mg by mouth At Bedtime        * Notice:  This list has 2 medication(s) that are the same as other medications prescribed for you. Read the directions carefully, and ask your doctor or other care provider to  review them with you.

## 2018-09-21 ENCOUNTER — ALLIED HEALTH/NURSE VISIT (OUTPATIENT)
Dept: INFUSION THERAPY | Facility: CLINIC | Age: 43
End: 2018-09-21
Attending: ALLERGY & IMMUNOLOGY
Payer: COMMERCIAL

## 2018-09-21 VITALS
TEMPERATURE: 98.6 F | SYSTOLIC BLOOD PRESSURE: 122 MMHG | OXYGEN SATURATION: 98 % | HEART RATE: 101 BPM | DIASTOLIC BLOOD PRESSURE: 72 MMHG | RESPIRATION RATE: 20 BRPM

## 2018-09-21 DIAGNOSIS — L50.1 URTICARIA, IDIOPATHIC: Primary | ICD-10-CM

## 2018-09-21 PROCEDURE — 25000128 H RX IP 250 OP 636: Performed by: INTERNAL MEDICINE

## 2018-09-21 PROCEDURE — 96372 THER/PROPH/DIAG INJ SC/IM: CPT

## 2018-09-21 RX ADMIN — OMALIZUMAB 300 MG: 202.5 INJECTION, SOLUTION SUBCUTANEOUS at 13:20

## 2018-09-21 ASSESSMENT — PAIN SCALES - GENERAL: PAINLEVEL: MILD PAIN (2)

## 2018-09-21 NOTE — PROGRESS NOTES
Infusion Nursing Note:  Holli Thacker presents today for Xolair.    Patient seen by provider today: No   present during visit today: Not Applicable.    Note: C/O sinus congestion, and is finishing a course of antibiotics for an ear infection. Believes these are due to recent travel.    Intravenous Access:  No Intravenous access/labs at this visit.    Treatment Conditions:  Not Applicable.    Post Infusion Assessment:  Patient tolerated injection without incident.    Discharge Plan:   Discharge instructions reviewed with: Patient.  Patient and/or family verbalized understanding of discharge instructions and all questions answered.  Observed for 30 mins per Xolair protocol.  AVS to patient via [employer]TOS[employer] USAT.  Patient will return 10/17/18 for Xolair for next appointment.   Patient discharged in stable condition accompanied by: self.  Departure Mode: Ambulatory.    Mercedez Gallagher RN

## 2018-09-21 NOTE — MR AVS SNAPSHOT
After Visit Summary   9/21/2018    Holli Thacker    MRN: [id number]           Patient Information     Date Of Birth          1975        Visit Information        Provider Department      9/21/2018 1:00 PM RH LAB DRAW 1 [facility] Infusion Services        Today's Diagnoses     Urticaria, idiopathic    -  1       Follow-ups after your visit        Your next 10 appointments already scheduled     Oct 17, 2018  1:15 PM CDT   injection with RH LAB DRAW 1   [facility] Infusion Services ([facility])    [facility]  [address]  [facility] [id number] [phone]              Who to contact     If you have questions or need follow up information about today's clinic visit or your schedule please contact [facility] INFUSION SERVICES directly at [phone].  Normal or non-critical lab and imaging results will be communicated to you by CyberSettle[employer], letter or phone within 4 business days after the clinic has received the results. If you do not hear from us within 7 days, please contact the clinic through [employer] or phone. If you have a critical or abnormal lab result, we will notify you by phone as soon as possible.  Submit refill requests through [employer] or call your pharmacy and they will forward the refill request to us. Please allow 3 business days for your refill to be completed.          Additional Information About Your Visit        MyChart Information     [employer] gives you secure access to your electronic health record. If you see a primary care provider, you can also send messages to your care team and make appointments. If you have questions, please call your primary care clinic.  If you do not have a primary care provider, please call [phone] and they will assist you.        Care EveryWhere ID     This is your Care EveryWhere ID. This could be used by other organizations to access  your [place] medical records  [id number]        Your Vitals Were     Pulse Temperature Respirations Pulse Oximetry          101 98.6  F (37  C) 20 98%         Blood Pressure from Last 3 Encounters:   09/21/18 122/72   08/29/18 123/56   08/08/18 110/57    Weight from Last 3 Encounters:   03/15/18 74.8 kg (165 lb)   11/29/17 83.3 kg (183 lb 9.6 oz)   03/10/17 90.7 kg (200 lb)              Today, you had the following     No orders found for display       Primary Care Provider Office Phone # Fax #    Hansa Boles [phone]       [place] [address] [address] [place] [address]        Equal Access to Services     SHANTA KAT : Toni murrietao Sofrankie, waaxda luqadaha, qaybta kaalmada adeegyada, anaid nunes. So [facility] [phone].    ATENCIÓN: Si habla español, tiene a trinidad disposición servicios gratuitos de asistencia lingüística. [facility] [phone].    We comply with applicable federal civil rights laws and [place] laws. We do not discriminate on the basis of race, color, national origin, age, disability, sex, sexual orientation, or gender identity.            Thank you!     Thank you for choosing [facility] INFUSION SERVICES  for your care. Our goal is always to provide you with excellent care. Hearing back from our patients is one way we can continue to improve our services. Please take a few minutes to complete the written survey that you may receive in the mail after your visit with us. Thank you!             Your Updated Medication List - Protect others around you: Learn how to safely use, store and throw away your medicines at www.disposemymeds.org.          This list is accurate as of 9/21/18  2:53 PM.  Always use your most recent med list.                   Brand Name Dispense Instructions for use Diagnosis    AIMOVIG 70 MG/ML injection   Generic drug:  erenumab-aooe      Inject 70 mg Subcutaneous every 30 days         albuterol (2.5 MG/3ML) 0.083% neb solution     1 Box    Take 1 vial (2.5 mg) by nebulization every 4 hours as needed for shortness of breath / dyspnea or wheezing        AMITRIPTYLINE HCL PO      Take 20 mg by mouth At Bedtime        cyclobenzaprine 10 MG tablet    FLEXERIL     Take 10 mg by mouth At Bedtime        EFFEXOR PO      Take 37.5 mg by mouth 2 times daily        fluticasone 50 MCG/ACT spray    FLONASE     Spray 1 spray into both nostrils daily        fluticasone-salmeterol 500-50 MCG/DOSE diskus inhaler    ADVAIR     Inhale 1 puff into the lungs 2 times daily        ibuprofen 200 MG tablet    ADVIL/MOTRIN    30 tablet    Take 1-2 tablets (200-400 mg) by mouth every 6 hours as needed for other (mild pain)    Post-op pain       ipratropium - albuterol 0.5 mg/2.5 mg/3 mL 0.5-2.5 (3) MG/3ML neb solution    DUONEB    1 Box    Take 1 vial (3 mLs) by nebulization 3 times daily        nortriptyline 50 MG capsule    PAMELOR     Take on capsule at betime        omalizumab 150 MG injection    XOLAIR     Inject 150 mg Subcutaneous See Admin Instructions        * oxyCODONE IR 5 MG tablet    ROXICODONE    15 tablet    Take 1-2 tablets (5-10 mg) by mouth every 4 hours as needed for pain        * oxyCODONE IR 5 MG tablet    ROXICODONE    10 tablet    Take 1-2 tablets (5-10 mg) by mouth every 3 hours as needed for other (Moderate to Severe Pain)    Post-op pain       pantoprazole 40 MG EC tablet    PROTONIX     Take 40 mg by mouth every morning        SINGULAIR PO      Take 10 mg by mouth At Bedtime        Vitamin D3 3000 units Tabs      Take 5,000 Units by mouth daily        XELJANZ 5 MG tablet   Generic drug:  tofacitinib      Take 5 mg by mouth 2 times daily        ZOLOFT PO      Take 200 mg by mouth At Bedtime        ZOMIG PO      Take 5 mg by mouth at onset of headache for migraine        ZONEGRAN PO      Take 100 mg by mouth At Bedtime        * Notice:  This list has 2 medication(s) that are the same as other  medications prescribed for you. Read the directions carefully, and ask your doctor or other care provider to review them with you.

## 2018-09-27 ENCOUNTER — APPOINTMENT (OUTPATIENT)
Dept: CT IMAGING | Facility: CLINIC | Age: 43
End: 2018-09-27
Attending: PHYSICIAN ASSISTANT
Payer: COMMERCIAL

## 2018-09-27 ENCOUNTER — HOSPITAL ENCOUNTER (EMERGENCY)
Facility: CLINIC | Age: 43
Discharge: HOME | End: 2018-09-27
Attending: INTERNAL MEDICINE | Admitting: INTERNAL MEDICINE
Payer: COMMERCIAL

## 2018-09-27 ENCOUNTER — APPOINTMENT (OUTPATIENT)
Dept: ULTRASOUND IMAGING | Facility: CLINIC | Age: 43
End: 2018-09-27
Attending: PHYSICIAN ASSISTANT
Payer: COMMERCIAL

## 2018-09-27 VITALS
OXYGEN SATURATION: 95 % | DIASTOLIC BLOOD PRESSURE: 57 MMHG | TEMPERATURE: 97.9 F | RESPIRATION RATE: 16 BRPM | SYSTOLIC BLOOD PRESSURE: 106 MMHG | HEART RATE: 75 BPM

## 2018-09-27 DIAGNOSIS — N83.201 CYST OF RIGHT OVARY: ICD-10-CM

## 2018-09-27 LAB
ALBUMIN UR-MCNC: NEGATIVE MG/DL
ANION GAP SERPL CALCULATED.3IONS-SCNC: 6 MMOL/L (ref 3–14)
APPEARANCE UR: CLEAR
B-HCG FREE SERPL-ACNC: <5 IU/L
BASOPHILS # BLD AUTO: 0.1 10E9/L (ref 0–0.2)
BASOPHILS NFR BLD AUTO: 0.7 %
BILIRUB UR QL STRIP: NEGATIVE
BUN SERPL-MCNC: 11 MG/DL (ref 7–30)
CALCIUM SERPL-MCNC: 8.4 MG/DL (ref 8.5–10.1)
CHLORIDE SERPL-SCNC: 107 MMOL/L (ref 94–109)
CO2 SERPL-SCNC: 28 MMOL/L (ref 20–32)
COLOR UR AUTO: ABNORMAL
CREAT SERPL-MCNC: 0.82 MG/DL (ref 0.52–1.04)
DIFFERENTIAL METHOD BLD: ABNORMAL
EOSINOPHIL # BLD AUTO: 0.7 10E9/L (ref 0–0.7)
EOSINOPHIL NFR BLD AUTO: 6 %
ERYTHROCYTE [DISTWIDTH] IN BLOOD BY AUTOMATED COUNT: 15.9 % (ref 10–15)
GFR SERPL CREATININE-BSD FRML MDRD: 76 ML/MIN/1.7M2
GLUCOSE SERPL-MCNC: 91 MG/DL (ref 70–99)
GLUCOSE UR STRIP-MCNC: NEGATIVE MG/DL
HCT VFR BLD AUTO: 36.6 % (ref 35–47)
HGB BLD-MCNC: 11.5 G/DL (ref 11.7–15.7)
HGB UR QL STRIP: NEGATIVE
IMM GRANULOCYTES # BLD: 0 10E9/L (ref 0–0.4)
IMM GRANULOCYTES NFR BLD: 0.2 %
KETONES UR STRIP-MCNC: NEGATIVE MG/DL
LEUKOCYTE ESTERASE UR QL STRIP: NEGATIVE
LYMPHOCYTES # BLD AUTO: 4.2 10E9/L (ref 0.8–5.3)
LYMPHOCYTES NFR BLD AUTO: 36.2 %
MCH RBC QN AUTO: 29 PG (ref 26.5–33)
MCHC RBC AUTO-ENTMCNC: 31.4 G/DL (ref 31.5–36.5)
MCV RBC AUTO: 92 FL (ref 78–100)
MONOCYTES # BLD AUTO: 0.8 10E9/L (ref 0–1.3)
MONOCYTES NFR BLD AUTO: 6.6 %
MUCOUS THREADS #/AREA URNS LPF: PRESENT /LPF
NEUTROPHILS # BLD AUTO: 5.8 10E9/L (ref 1.6–8.3)
NEUTROPHILS NFR BLD AUTO: 50.3 %
NITRATE UR QL: NEGATIVE
NRBC # BLD AUTO: 0 10*3/UL
NRBC BLD AUTO-RTO: 0 /100
PH UR STRIP: 7 PH (ref 5–7)
PLATELET # BLD AUTO: 399 10E9/L (ref 150–450)
POTASSIUM SERPL-SCNC: 3.5 MMOL/L (ref 3.4–5.3)
RBC # BLD AUTO: 3.97 10E12/L (ref 3.8–5.2)
RBC #/AREA URNS AUTO: 1 /HPF (ref 0–2)
SODIUM SERPL-SCNC: 141 MMOL/L (ref 133–144)
SOURCE: ABNORMAL
SP GR UR STRIP: 1.01 (ref 1–1.03)
SQUAMOUS #/AREA URNS AUTO: 2 /HPF (ref 0–1)
UROBILINOGEN UR STRIP-MCNC: 0 MG/DL (ref 0–2)
WBC # BLD AUTO: 11.5 10E9/L (ref 4–11)
WBC #/AREA URNS AUTO: 1 /HPF (ref 0–5)

## 2018-09-27 PROCEDURE — 96361 HYDRATE IV INFUSION ADD-ON: CPT

## 2018-09-27 PROCEDURE — 96375 TX/PRO/DX INJ NEW DRUG ADDON: CPT

## 2018-09-27 PROCEDURE — 85025 COMPLETE CBC W/AUTO DIFF WBC: CPT | Performed by: PHYSICIAN ASSISTANT

## 2018-09-27 PROCEDURE — 80048 BASIC METABOLIC PNL TOTAL CA: CPT | Performed by: PHYSICIAN ASSISTANT

## 2018-09-27 PROCEDURE — 74176 CT ABD & PELVIS W/O CONTRAST: CPT

## 2018-09-27 PROCEDURE — 81001 URINALYSIS AUTO W/SCOPE: CPT | Performed by: PHYSICIAN ASSISTANT

## 2018-09-27 PROCEDURE — 96376 TX/PRO/DX INJ SAME DRUG ADON: CPT

## 2018-09-27 PROCEDURE — 25000128 H RX IP 250 OP 636: Performed by: PHYSICIAN ASSISTANT

## 2018-09-27 PROCEDURE — 84702 CHORIONIC GONADOTROPIN TEST: CPT

## 2018-09-27 PROCEDURE — 93976 VASCULAR STUDY: CPT

## 2018-09-27 PROCEDURE — 99285 EMERGENCY DEPT VISIT HI MDM: CPT | Mod: 25

## 2018-09-27 PROCEDURE — 96374 THER/PROPH/DIAG INJ IV PUSH: CPT

## 2018-09-27 RX ORDER — OXYCODONE AND ACETAMINOPHEN 5; 325 MG/1; MG/1
1-2 TABLET ORAL EVERY 4 HOURS PRN
Qty: 12 TABLET | Refills: 0 | Status: SHIPPED | OUTPATIENT
Start: 2018-09-27 | End: 2019-08-21

## 2018-09-27 RX ORDER — HYDROMORPHONE HYDROCHLORIDE 1 MG/ML
0.5 INJECTION, SOLUTION INTRAMUSCULAR; INTRAVENOUS; SUBCUTANEOUS ONCE
Status: COMPLETED | OUTPATIENT
Start: 2018-09-27 | End: 2018-09-27

## 2018-09-27 RX ORDER — ONDANSETRON 2 MG/ML
4 INJECTION INTRAMUSCULAR; INTRAVENOUS ONCE
Status: COMPLETED | OUTPATIENT
Start: 2018-09-27 | End: 2018-09-27

## 2018-09-27 RX ADMIN — Medication 0.5 MG: at 17:49

## 2018-09-27 RX ADMIN — SODIUM CHLORIDE 1000 ML: 9 INJECTION, SOLUTION INTRAVENOUS at 17:49

## 2018-09-27 RX ADMIN — Medication 0.5 MG: at 21:19

## 2018-09-27 RX ADMIN — ONDANSETRON 4 MG: 2 INJECTION INTRAMUSCULAR; INTRAVENOUS at 17:49

## 2018-09-27 ASSESSMENT — ENCOUNTER SYMPTOMS
NAUSEA: 1
DIARRHEA: 0
ABDOMINAL PAIN: 1
BACK PAIN: 1
VOMITING: 0
FEVER: 0
DYSURIA: 0

## 2018-09-27 NOTE — ED TRIAGE NOTES
Patient presents with complaints of right sided abdomen pain that started a few hours ago. Patient states she does have history of ovarian cysts and the pain feels similar. No medication PTA.. ABC intact without need for intervention at this time.

## 2018-09-27 NOTE — ED AVS SNAPSHOT
[facility] Emergency Department    [address]    [facility] [address]    Phone:  [phone]    Fax:  [phone]                                       Holli Thacker   MRN: [id number]    Department:  [facility] Emergency Department   Date of Visit:  9/27/2018           After Visit Summary Signature Page     I have received my discharge instructions, and my questions have been answered. I have discussed any challenges I see with this plan with the nurse or doctor.    ..........................................................................................................................................  Patient/Patient Representative Signature      ..........................................................................................................................................  Patient Representative Print Name and Relationship to Patient    ..................................................               ................................................  Date                                   Time    ..........................................................................................................................................  Reviewed by Signature/Title    ...................................................              ..............................................  Date                                               Time          22EPIC Rev 08/18

## 2018-09-27 NOTE — ED PROVIDER NOTES
History     Chief Complaint:  Abdominal Pain    HPI   Holli Thacker is a 43 year old female with a history of chronic migraines, RA, asthma and ovarian cysts who presents to the ED for evaluation of right sided abdominal pain. The patient notes an extensive history of ovarian cysts, 6 in the past, and she likens her current symptoms to previous incidences.  She states that she has had some low back pain with those prior episodes as well and she is experiencing this somewhat today.  With the acute onset of her current symptoms, around 1330 today, she reports a twisting sharp pain in her right side, radiating into her back and throughout her lower abdomen. She has worsening pain with movement and when standing, and endorses nausea, no vomiting. She has not yet taken anything for this pain, as intervention with over the counter med's has done little to improve her pain in the past. She otherwise denies diarrhea, constipation, changes in urination, hematuria, vaginal bleeding or discharge. No chest pain or shortness of breath.    Allergies:  Azithromycin  Erythromycin  Verapamil    Medications:    Albuterol  Amitriptyline  Flexeril  Aimovig  Advair  Singulair  Zoloft  Protonix  Pamelor  Xolair  Xeljanz  Zomig  Effexor  Zonegran    Past Medical History:    Chronic migraines  Ovarian cysts  Asthma  Depression  Anxiety  Arnold-chiari malformation, type I  Fibromyalgia  RA    Past Surgical History:    GYN surgery  Laparoscopic cystectomy ovarian    Family History:    History reviewed. No pertinent family history.     Social History:  The patient was accompanied to the ED by a friend.  Smoking Status: Current  Smokeless Tobacco: Never  Alcohol Use: No  Marital Status:       Review of Systems   Constitutional: Negative for fever.   Gastrointestinal: Positive for abdominal pain and nausea. Negative for diarrhea and vomiting.   Genitourinary: Negative for dysuria.   Musculoskeletal: Positive for back pain.    All other systems reviewed and are negative.    Physical Exam   First Vitals:  BP: 121/53  Pulse: 88  Heart Rate: 88  Temp: 97.9  F (36.6  C)  Resp: 18  SpO2: 100 %    Physical Exam  General: Alert and cooperative with exam.  Uncomfortable appearing female.  Normal mentation.  Head:  Scalp is NC/AT  Eyes:  No scleral icterus, PERRL  ENT:  The external nose and ears are normal.  Neck:  Normal range of motion without rigidity.  CV:  Regular rate and rhythm    No pathologic murmur, rubs, or gallops.  Resp:  Breath sounds are clear bilaterally.  No crackles, wheezes, rhonchi.    Non-labored, no retractions or accessory muscle use  GI:  Abdomen is soft, no distension, tenderness to palpation in right lower quadrant and suprapubic area. No peritoneal signs.  Bowel sounds present in all quadrants.  :  Left flank tenderness.  No right flank tenderness  MS:  No lower extremity edema     No midline Lumbar tenderness.  Skin:  Warm and dry, No rash or lesions noted.  Neuro: Oriented x 3. No gross motor deficits.    Emergency Department Course     Imaging:  Radiology findings were communicated with the patient who voiced understanding of the findings.  US Pelvis Cmplt w transvag & Doppler LmtPel Duplex:  There is a 2.7 x 1.7 x 2.1 cm right ovarian cyst. Left  ovary could not be visualized. Otherwise unremarkable.    As read by radiology    CT Abdomen Pelvis w/o Contrast:  1. Mild right base atelectasis.  2. A 0.5 cm right mid pole nonobstructing kidney stone. No ureteral  stones or hydronephrosis noted.    As read by radiology    Laboratory:  Laboratory findings were communicated with the patient who voiced understanding of the findings.    CBC: WBC 11.5 (H), HGB 11.5 (L), o/w WNL ()  BMP: Calcium 8.4 (L) o/w WNL (Creatinine 0.82)    UA: Straw and clear, squamous epithelial 2 (H), mucous present, o/w Negative    ISTAT HCG Quantitative pregnancy POCT: <5.0    Interventions:  1749 - Dilaudid 0.5 mg IV  1749 - Zofran 4  mg IV  1749 - NS Bolus 1,000mL IV    Emergency Department Course:  Nursing notes and vitals reviewed.    The patient was sent for a US and CT Abdomen/pelvis while in the emergency department, results above.     IV was inserted and blood was drawn for laboratory testing, results above.    The patient provided a urine sample here in the emergency department. This was sent for laboratory testing, findings above.    1716: I performed an exam of the patient as documented above.   2032: Patient rechecked and updated. Patient is comfortable and well appearing.     Findings and plan explained to the Patient. Patient discharged home with instructions regarding supportive care, medications, and reasons to return. The importance of close follow-up was reviewed.     Impression & Plan      Medical Decision Making:  Holli Tucker is a 44 yo female who presents with right lower abdominal pain and low back pain.  Patient history and records reviewed.  Broad differential was considered.  On examination, the patient is well-appearing with normal vitals and afebrile.  She has some tenderness over the right flank and the right lower quadrant area.  Lab work and urinalysis relatively unremarkable.  Patient stated that her symptoms did seem very similar to her prior ovarian cyst, however initially, decision was made to obtain noncontrast CT to additionally evaluate for possible kidney stone or other intra-abdominal process.  This demonstrated right intra-renal stone, but no ureteral stone or explanation for the patient's symptoms.  No evidence of appendicitis, diverticulitis, bowel obstruction, or free air.  I did order a pelvic ultrasound with Doppler which demonstrated small 2.7 cm right ovarian cyst, with no evidence of torsion.  Patient's pregnancy test negative.  She has no bowel symptoms to suggest colitis as an etiology for her pain.  Do not suspect pelvic inflammatory disease, as patient is afebrile, no vaginal discharge or  pain.    The patient's pain was improved after symptomatic treatment as above.  As she has had multiple prior issues with ovarian cysts and needed to have surgery in the past, I did give her the referral information for the OB/GYN who she had seen previously with instruction to contact him as an outpatient for follow-up.  Patient will be discharged home with a short course of pain medication as below, and instructed to use ibuprofen with this as well.  Discussed indications to return to ED if new or worsening symptoms.    Diagnosis:    ICD-10-CM    1. Cyst of right ovary N83.201        Disposition:  discharged to home    Discharge Medications:  New Prescriptions    OXYCODONE-ACETAMINOPHEN (PERCOCET) 5-325 MG PER TABLET    Take 1-2 tablets by mouth every 4 hours as needed for pain       Tiffany Lezama  9/27/2018   [facility] EMERGENCY DEPARTMENT  ITiffany, bernice serving as a scribe at 5:16 PM on 9/27/2018 to document services personally performed by MONROE Jose based on my observations and the provider's statements to me.       Tenzin Moscoso PA-C  09/27/18 [id number]

## 2018-09-27 NOTE — LETTER
09/27/18      To Whom it may concern:    Omkar Thacker was in our Emergency Department today, 09/27/18. with a patient who needed their assistance.  Please excuse him from work/school.      Sincerely,        Kristopher Mercado RN

## 2018-09-27 NOTE — ED AVS SNAPSHOT
[facility]    [address]    [facility] [address]    Phone:  [phone]    Fax:  [phone]                                       Holli Thacker   MRN: [id number]    Department:  [facility]   Date of Visit:  9/27/2018           Patient Information     Date Of Birth          1975        Your diagnoses for this visit were:     Cyst of right ovary        You were seen by Berta Baird MD.      Follow-up Information     Follow up with [facility].    Specialty:  EMERGENCY MEDICINE    Why:  As needed, If symptoms worsen    Contact information:    [address]  [facility] [address] [phone]        Follow up with Juice Gold MD.    Specialty:  OB/Gyn    Why:  As needed    Contact information:    [facility]  [address]  [facility] [address]  [phone]          Discharge Instructions       Discharge Instructions  Ovarian Cyst    Abdominal (belly) pain can be caused by many things. Your provider today has found that you have a cyst on the ovary. Women in their reproductive years form cysts every month, but only cause pain if they are very large, or if they rupture and release blood or fluid. Fortunately, they rarely require surgery or hospitalization. The pain from a ruptured cyst usually gets gradually better, and should be much better within a few days. If there is a large cyst, it will usually go away within 1-2 months, but needs to be watched to be sure it does go away, since sometimes a large cyst can become a cancer. There can be complications of a cyst, or other problems that cannot be found right away, so it is very important that you follow up as directed.      Generally, every Emergency Department visit should have a follow-up clinic visit with either a primary or a specialty clinic/provider. Please follow-up as instructed  by your emergency provider today.    Return to the Emergency Department right away if:    Your pain becomes much worse or constant    You get an oral temperature above 100.4 F or as directed by your provider.    You have frequent vomiting    You faint, or feel very weak.    You have new symptoms or anything that worries you.    What can I do to help myself?    Take any medication prescribed by your provider.    You may use Tylenol  (acetaminophen) or Advil , Motrin  (ibuprofen) for pain. Be sure to read and follow the package directions, and ask your provider if you have questions.    Avoid sex for several days, because it will probably be painful.    If you were given a prescription for medicine here today, be sure to read all of the information (including the package insert) that comes with your prescription.  This will include important information about the medicine, its side effects, and any warnings that you need to know about.  The pharmacist who fills the prescription can provide more information and answer questions you may have about the medicine.  If you have questions or concerns that the pharmacist cannot address, please call or return to the Emergency Department.     Remember that you can always come back to the Emergency Department if you are not able to see your regular provider in the amount of time listed above, if you get any new symptoms, or if there is anything that worries you.      Your next 10 appointments already scheduled     Oct 17, 2018  1:00 PM CDT   Level 1 with  INFUSION CHAIR 2   [facility] ([facility])    [facility]  [address]  [facility] [id number] [phone]              24 Hour Appointment Hotline       To make an appointment at any [place] clinic, call [phone] ([phone]). If you don't have a family doctor or clinic, we will help you find one. [facility] are  conveniently located to serve the needs of you and your family.             Review of your medicines      START taking        Dose / Directions Last dose taken    oxyCODONE-acetaminophen 5-325 MG per tablet   Commonly known as:  PERCOCET   Dose:  1-2 tablet   Quantity:  12 tablet        Take 1-2 tablets by mouth every 4 hours as needed for pain   Refills:  0          Our records show that you are taking the medicines listed below. If these are incorrect, please call your family doctor or clinic.        Dose / Directions Last dose taken    AIMOVIG 70 MG/ML injection   Dose:  70 mg   Generic drug:  erenumab-aooe        Inject 70 mg Subcutaneous every 30 days   Refills:  0        albuterol (2.5 MG/3ML) 0.083% neb solution   Dose:  1 vial   Quantity:  1 Box        Take 1 vial (2.5 mg) by nebulization every 4 hours as needed for shortness of breath / dyspnea or wheezing   Refills:  0        AMITRIPTYLINE HCL PO   Dose:  20 mg        Take 20 mg by mouth At Bedtime   Refills:  0        cyclobenzaprine 10 MG tablet   Commonly known as:  FLEXERIL   Dose:  10 mg        Take 10 mg by mouth At Bedtime   Refills:  0        EFFEXOR PO   Dose:  37.5 mg        Take 37.5 mg by mouth 2 times daily   Refills:  0        fluticasone 50 MCG/ACT spray   Commonly known as:  FLONASE   Dose:  1 spray        Spray 1 spray into both nostrils daily   Refills:  0        fluticasone-salmeterol 500-50 MCG/DOSE diskus inhaler   Commonly known as:  ADVAIR   Dose:  1 puff        Inhale 1 puff into the lungs 2 times daily   Refills:  0        ibuprofen 200 MG tablet   Commonly known as:  ADVIL/MOTRIN   Dose:  200-400 mg   Quantity:  30 tablet        Take 1-2 tablets (200-400 mg) by mouth every 6 hours as needed for other (mild pain)   Refills:  0        ipratropium - albuterol 0.5 mg/2.5 mg/3 mL 0.5-2.5 (3) MG/3ML neb solution   Commonly known as:  DUONEB   Dose:  1 vial   Quantity:  1 Box        Take 1 vial (3 mLs) by nebulization 3 times daily    Refills:  3        nortriptyline 50 MG capsule   Commonly known as:  PAMELOR        Take on capsule at betime   Refills:  0        omalizumab 150 MG injection   Commonly known as:  XOLAIR   Dose:  150 mg        Inject 150 mg Subcutaneous See Admin Instructions   Refills:  0        * oxyCODONE IR 5 MG tablet   Commonly known as:  ROXICODONE   Dose:  5-10 mg   Quantity:  15 tablet        Take 1-2 tablets (5-10 mg) by mouth every 4 hours as needed for pain   Refills:  0        * oxyCODONE IR 5 MG tablet   Commonly known as:  ROXICODONE   Dose:  5-10 mg   Quantity:  10 tablet        Take 1-2 tablets (5-10 mg) by mouth every 3 hours as needed for other (Moderate to Severe Pain)   Refills:  0        pantoprazole 40 MG EC tablet   Commonly known as:  PROTONIX   Dose:  40 mg        Take 40 mg by mouth every morning   Refills:  0        SINGULAIR PO   Dose:  10 mg        Take 10 mg by mouth At Bedtime   Refills:  0        Vitamin D3 3000 units Tabs   Dose:  5000 Units        Take 5,000 Units by mouth daily   Refills:  0        XELJANZ 5 MG tablet   Dose:  5 mg   Generic drug:  tofacitinib        Take 5 mg by mouth 2 times daily   Refills:  0        ZOLOFT PO   Dose:  200 mg        Take 200 mg by mouth At Bedtime   Refills:  0        ZOMIG PO   Dose:  5 mg        Take 5 mg by mouth at onset of headache for migraine   Refills:  0        ZONEGRAN PO   Dose:  100 mg        Take 100 mg by mouth At Bedtime   Refills:  0        * Notice:  This list has 2 medication(s) that are the same as other medications prescribed for you. Read the directions carefully, and ask your doctor or other care provider to review them with you.            Information about OPIOIDS     PRESCRIPTION OPIOIDS: WHAT YOU NEED TO KNOW   We gave you an opioid (narcotic) pain medicine. It is important to manage your pain, but opioids are not always the best choice. You should first try all the other options your care team gave you. Take this medicine for as  short a time (and as few doses) as possible.    Some activities can increase your pain, such as bandage changes or therapy sessions. It may help to take your pain medicine 30 to 60 minutes before these activities. Reduce your stress by getting enough sleep, working on hobbies you enjoy and practicing relaxation or meditation. Talk to your care team about ways to manage your pain beyond prescription opioids.    These medicines have risks:    DO NOT drive when on new or higher doses of pain medicine. These medicines can affect your alertness and reaction times, and you could be arrested for driving under the influence (DUI). If you need to use opioids long-term, talk to your care team about driving.    DO NOT operate heavy machinery    DO NOT do any other dangerous activities while taking these medicines.    DO NOT drink any alcohol while taking these medicines.     If the opioid prescribed includes acetaminophen, DO NOT take with any other medicines that contain acetaminophen. Read all labels carefully. Look for the word  acetaminophen  or  Tylenol.  Ask your pharmacist if you have questions or are unsure.    You can get addicted to pain medicines, especially if you have a history of addiction (chemical, alcohol or substance dependence). Talk to your care team about ways to reduce this risk.    All opioids tend to cause constipation. Drink plenty of water and eat foods that have a lot of fiber, such as fruits, vegetables, prune juice, apple juice and high-fiber cereal. Take a laxative (Miralax, milk of magnesia, Colace, Senna) if you don t move your bowels at least every other day. Other side effects include upset stomach, sleepiness, dizziness, throwing up, tolerance (needing more of the medicine to have the same effect), physical dependence and slowed breathing.    Store your pills in a secure place, locked if possible. We will not replace any lost or stolen medicine. If you don t finish your medicine, please throw  away (dispose) as directed by your pharmacist. The [place] [employer] has more information about safe disposal: https://www.pca.state.[place]/living-green/managing-unwanted-medications        Prescriptions were sent or printed at these locations (1 Prescription)                   Other Prescriptions                Printed at Department/Unit printer (1 of 1)         oxyCODONE-acetaminophen (PERCOCET) 5-325 MG per tablet                Procedures and tests performed during your visit     Basic metabolic panel    CBC with platelets differential    CT Abdomen Pelvis w/o Contrast    ISTAT HCG Quantitative Pregnancy Nursing POCT    ISTAT HCG Quantitative Pregnancy POCT    UA with Microscopic    US Pelvis Cmplt w Transvag & Doppler LmtPel Duplex Limited      Orders Needing Specimen Collection     None      Pending Results     Date and Time Order Name Status Description    9/27/2018 1846 CT Abdomen Pelvis w/o Contrast Preliminary     9/27/2018 1723 US Pelvis Cmplt w Transvag & Doppler LmtPel Duplex Limited Preliminary             Pending Culture Results     No orders found from 9/25/2018 to 9/28/2018.            Pending Results Instructions     If you had any lab results that were not finalized at the time of your Discharge, you can call the ED Lab Result RN at [phone]. You will be contacted by this team for any positive Lab results or changes in treatment. The nurses are available 7 days a week from 10A to 6:30P.  You can leave a message 24 hours per day and they will return your call.        Test Results From Your Hospital Stay        9/27/2018  5:43 PM      Component Results     Component Value Ref Range & Units Status    WBC 11.5 (H) 4.0 - 11.0 10e9/L Final    RBC Count 3.97 3.8 - 5.2 10e12/L Final    Hemoglobin 11.5 (L) 11.7 - 15.7 g/dL Final    Hematocrit 36.6 35.0 - 47.0 % Final    MCV 92 78 - 100 fl Final    MCH 29.0 26.5 - 33.0 pg Final    MCHC 31.4 (L) 31.5 - 36.5 g/dL Final    RDW 15.9 (H) 10.0  - 15.0 % Final    Platelet Count 399 150 - 450 10e9/L Final    Diff Method Automated Method  Final    % Neutrophils 50.3 % Final    % Lymphocytes 36.2 % Final    % Monocytes 6.6 % Final    % Eosinophils 6.0 % Final    % Basophils 0.7 % Final    % Immature Granulocytes 0.2 % Final    Nucleated RBCs 0 0 /100 Final    Absolute Neutrophil 5.8 1.6 - 8.3 10e9/L Final    Absolute Lymphocytes 4.2 0.8 - 5.3 10e9/L Final    Absolute Monocytes 0.8 0.0 - 1.3 10e9/L Final    Absolute Eosinophils 0.7 0.0 - 0.7 10e9/L Final    Absolute Basophils 0.1 0.0 - 0.2 10e9/L Final    Abs Immature Granulocytes 0.0 0 - 0.4 10e9/L Final    Absolute Nucleated RBC 0.0  Final         9/27/2018  5:57 PM      Component Results     Component Value Ref Range & Units Status    Sodium 141 133 - 144 mmol/L Final    Potassium 3.5 3.4 - 5.3 mmol/L Final    Chloride 107 94 - 109 mmol/L Final    Carbon Dioxide 28 20 - 32 mmol/L Final    Anion Gap 6 3 - 14 mmol/L Final    Glucose 91 70 - 99 mg/dL Final    Urea Nitrogen 11 7 - 30 mg/dL Final    Creatinine 0.82 0.52 - 1.04 mg/dL Final    GFR Estimate 76 >60 mL/min/1.7m2 Final    Non  GFR Calc    GFR Estimate If Black >90 >60 mL/min/1.7m2 Final    African American GFR Calc    Calcium 8.4 (L) 8.5 - 10.1 mg/dL Final         9/27/2018  8:50 PM      Component Results     Component Value Ref Range & Units Status    Color Urine Straw  Final    Appearance Urine Clear  Final    Glucose Urine Negative NEG^Negative mg/dL Final    Bilirubin Urine Negative NEG^Negative Final    Ketones Urine Negative NEG^Negative mg/dL Final    Specific Gravity Urine 1.009 1.003 - 1.035 Final    Blood Urine Negative NEG^Negative Final    pH Urine 7.0 5.0 - 7.0 pH Final    Protein Albumin Urine Negative NEG^Negative mg/dL Final    Urobilinogen mg/dL 0.0 0.0 - 2.0 mg/dL Final    Nitrite Urine Negative NEG^Negative Final    Leukocyte Esterase Urine Negative NEG^Negative Final    Source Midstream Urine  Final    WBC Urine 1 0  - 5 /HPF Final    RBC Urine 1 0 - 2 /HPF Final    Squamous Epithelial /HPF Urine 2 (H) 0 - 1 /HPF Final    Mucous Urine Present (A) NEG^Negative /LPF Final         9/27/2018  8:10 PM      Narrative     US PELVIS COMPLETE WITH TRANSVAGINAL AND DOPPLER LIMITED    9/27/2018  7:51 PM     HISTORY: Pelvic pain.    TECHNIQUE: Transvaginal images were performed to better evaluate the  patient's uterus, ovaries and endometrial stripe.    COMPARISON: None.    FINDINGS: The uterus is unremarkable measuring 8.2 x 4.7 x 5.3 cm. No  fibroids are evident. Endometrial stripe measures 0.7 cm and is within  normal limits for a premenopausal patient. The right ovary contains a  2.7 x 1.7 x 2.1 cm cyst. The left ovary could not be visualized. No  adnexal masses are identified. No free pelvic fluid is present. There  is normal blood flow using color Doppler and spectral waveform  analysis to the right ovary.        Impression     IMPRESSION: There is a 2.7 x 1.7 x 2.1 cm right ovarian cyst. Left  ovary could not be visualized. Otherwise unremarkable.         9/27/2018  5:53 PM      Component Results     Component Value Ref Range & Units Status    HCG Quantitative Serum <5.0 <5.0 IU/L Final         9/27/2018  7:47 PM      Narrative     CT ABDOMEN PELVIS WITHOUT CONTRAST   9/27/2018 7:28 PM     HISTORY: Right lower quadrant pain, right flank pain, history of  ovarian cysts.    TECHNIQUE: No IV contrast material. Radiation dose for this scan was  reduced using automated exposure control, adjustment of the mA and/or  kV according to patient size, or iterative reconstruction technique.    COMPARISON: None.    FINDINGS: Mild right base atelectasis.    The noncontrast appearance of the liver, spleen, pancreas, and  gallbladder are unremarkable.    No adrenal lesions. There is a 0.5 cm right mid pole nonobstructing  kidney stone. No left kidney stones. No ureteral stones. No  hydronephrosis. No retroperitoneal adenopathy or evidence of  aortic  aneurysm.    Scans through the pelvis demonstrate a normal-appearing somewhat  distended bladder. No adenopathy or evidence of adnexal mass.    No diverticulitis or appendicitis. No bowel obstruction, free air, or  free fluid.        Impression     IMPRESSION:  1. Mild right base atelectasis.  2. A 0.5 cm right mid pole nonobstructing kidney stone. No ureteral  stones or hydronephrosis noted.                Clinical Quality Measure: Blood Pressure Screening     Your blood pressure was checked while you were in the emergency department today. The last reading we obtained was  BP: 122/80 . Please read the guidelines below about what these numbers mean and what you should do about them.  If your systolic blood pressure (the top number) is less than 120 and your diastolic blood pressure (the bottom number) is less than 80, then your blood pressure is normal. There is nothing more that you need to do about it.  If your systolic blood pressure (the top number) is 120-139 or your diastolic blood pressure (the bottom number) is 80-89, your blood pressure may be higher than it should be. You should have your blood pressure rechecked within a year by a primary care provider.  If your systolic blood pressure (the top number) is 140 or greater or your diastolic blood pressure (the bottom number) is 90 or greater, you may have high blood pressure. High blood pressure is treatable, but if left untreated over time it can put you at risk for heart attack, stroke, or kidney failure. You should have your blood pressure rechecked by a primary care provider within the next 4 weeks.  If your provider in the emergency department today gave you specific instructions to follow-up with your doctor or provider even sooner than that, you should follow that instruction and not wait for up to 4 weeks for your follow-up visit.        Thank you for choosing [facility]       Thank you for choosing [facility] for your care. Our goal is always  to provide you with excellent care. Hearing back from our patients is one way we can continue to improve our services. Please take a few minutes to complete the written survey that you may receive in the mail after you visit with us. Thank you!        [employer] Information     [employer] gives you secure access to your electronic health record. If you see a primary care provider, you can also send messages to your care team and make appointments. If you have questions, please call your primary care clinic.  If you do not have a primary care provider, please call [phone] and they will assist you.        Care EveryWhere ID     This is your Care EveryWhere ID. This could be used by other organizations to access your [facility] medical records  [id number]        Equal Access to Services     SHANTA KAT : Toni López, terence trammell, maxx schmidt, anaid nunes. So [facility] [phone].    ATENCIÓN: Si habla español, tiene a trinidad disposición servicios gratuitos de asistencia lingüística. Llame al [phone].    We comply with applicable federal civil rights laws and [place] laws. We do not discriminate on the basis of race, color, national origin, age, disability, sex, sexual orientation, or gender identity.            After Visit Summary       This is your record. Keep this with you and show to your community pharmacist(s) and doctor(s) at your next visit.

## 2018-09-28 NOTE — DISCHARGE INSTRUCTIONS
Discharge Instructions  Ovarian Cyst    Abdominal (belly) pain can be caused by many things. Your provider today has found that you have a cyst on the ovary. Women in their reproductive years form cysts every month, but only cause pain if they are very large, or if they rupture and release blood or fluid. Fortunately, they rarely require surgery or hospitalization. The pain from a ruptured cyst usually gets gradually better, and should be much better within a few days. If there is a large cyst, it will usually go away within 1-2 months, but needs to be watched to be sure it does go away, since sometimes a large cyst can become a cancer. There can be complications of a cyst, or other problems that cannot be found right away, so it is very important that you follow up as directed.      Generally, every Emergency Department visit should have a follow-up clinic visit with either a primary or a specialty clinic/provider. Please follow-up as instructed by your emergency provider today.    Return to the Emergency Department right away if:    Your pain becomes much worse or constant    You get an oral temperature above 100.4 F or as directed by your provider.    You have frequent vomiting    You faint, or feel very weak.    You have new symptoms or anything that worries you.    What can I do to help myself?    Take any medication prescribed by your provider.    You may use Tylenol  (acetaminophen) or Advil , Motrin  (ibuprofen) for pain. Be sure to read and follow the package directions, and ask your provider if you have questions.    Avoid sex for several days, because it will probably be painful.    If you were given a prescription for medicine here today, be sure to read all of the information (including the package insert) that comes with your prescription.  This will include important information about the medicine, its side effects, and any warnings that you need to know about.  The pharmacist who fills the  prescription can provide more information and answer questions you may have about the medicine.  If you have questions or concerns that the pharmacist cannot address, please call or return to the Emergency Department.     Remember that you can always come back to the Emergency Department if you are not able to see your regular provider in the amount of time listed above, if you get any new symptoms, or if there is anything that worries you.

## 2018-09-28 NOTE — ED PROVIDER NOTES
Emergency Department Attending Supervision Note  9/27/2018  10:39 PM      I evaluated this patient in conjunction with Tenzin Moscoso PA-C    Briefly, the patient presented with  RLQ pain.    On my exam, she had tenderness but no peritoneal signs. She is interested in surgery, but discussed no need for immediate operative intervention.     My impression is :      Diagnosis    ICD-10-CM    1. Cyst of right ovary N83.201          MD Oli Gu Susan Gail, MD  09/27/18 2245

## 2018-09-28 NOTE — ED NOTES
Assume care of patient. Introduce self as patient nurse. Patient currently laying in bed. Patient stated pain is still present, but improved, currently rate pain 6/10. Updated patient with point of care. Call light within reach. VS stable. Will continue to monitor.

## 2018-10-12 ENCOUNTER — HOSPITAL PATHOLOGY (OUTPATIENT)
Dept: OTHER | Facility: CLINIC | Age: 43
End: 2018-10-12

## 2018-10-15 LAB — COPATH REPORT: NORMAL

## 2018-10-17 ENCOUNTER — INFUSION THERAPY VISIT (OUTPATIENT)
Dept: INFUSION THERAPY | Facility: CLINIC | Age: 43
End: 2018-10-17
Attending: ALLERGY & IMMUNOLOGY
Payer: COMMERCIAL

## 2018-10-17 VITALS
RESPIRATION RATE: 16 BRPM | OXYGEN SATURATION: 100 % | DIASTOLIC BLOOD PRESSURE: 69 MMHG | SYSTOLIC BLOOD PRESSURE: 108 MMHG | TEMPERATURE: 97.9 F

## 2018-10-17 DIAGNOSIS — L50.1 URTICARIA, IDIOPATHIC: Primary | ICD-10-CM

## 2018-10-17 PROCEDURE — 96372 THER/PROPH/DIAG INJ SC/IM: CPT

## 2018-10-17 PROCEDURE — 25000128 H RX IP 250 OP 636: Performed by: INTERNAL MEDICINE

## 2018-10-17 RX ADMIN — OMALIZUMAB 300 MG: 202.5 INJECTION, SOLUTION SUBCUTANEOUS at 13:04

## 2018-10-17 NOTE — MR AVS SNAPSHOT
After Visit Summary   10/17/2018    Holli Thacker    MRN: [id number]           Patient Information     Date Of Birth          1975        Visit Information        Provider Department      10/17/2018 1:00 PM RH INFUSION CHAIR 11 [facility] Infusion Services        Today's Diagnoses     Urticaria, idiopathic    -  1       Follow-ups after your visit        Who to contact     If you have questions or need follow up information about today's clinic visit or your schedule please contact [facility] INFUSION SERVICES directly at [phone].  Normal or non-critical lab and imaging results will be communicated to you by [employer], letter or phone within 4 business days after the clinic has received the results. If you do not hear from us within 7 days, please contact the clinic through [employer] or phone. If you have a critical or abnormal lab result, we will notify you by phone as soon as possible.  Submit refill requests through [employer] or call your pharmacy and they will forward the refill request to us. Please allow 3 business days for your refill to be completed.          Additional Information About Your Visit        MyChart Information     [employer] gives you secure access to your electronic health record. If you see a primary care provider, you can also send messages to your care team and make appointments. If you have questions, please call your primary care clinic.  If you do not have a primary care provider, please call [phone] and they will assist you.        Care EveryWhere ID     This is your Care EveryWhere ID. This could be used by other organizations to access your [place] medical records  [id number]        Your Vitals Were     Temperature Respirations Pulse Oximetry             97.9  F (36.6  C) (Tympanic) 16 100%          Blood Pressure from Last 3 Encounters:   10/17/18 108/69   09/27/18 106/57   09/21/18 122/72    Weight from Last 3  Encounters:   03/15/18 74.8 kg (165 lb)   11/29/17 83.3 kg (183 lb 9.6 oz)   03/10/17 90.7 kg (200 lb)              Today, you had the following     No orders found for display       Primary Care Provider Office Phone # Fax #    Hansa Boles [phone]       [place] [address] [place] [address]        Equal Access to Services     SHANTA KAT : Hadii aad ku hadasho Soomaali, waaxda luqadaha, qaybta kaalmada adeegyada, waxay idiin hayaan adeeg woodroseannekriss reyes . So [facility] [phone].    ATENCIÓN: Si habla español, tiene a trinidad disposición servicios gratuitos de asistencia lingüística. [facility] [phone].    We comply with applicable federal civil rights laws and [place] laws. We do not discriminate on the basis of race, color, national origin, age, disability, sex, sexual orientation, or gender identity.            Thank you!     Thank you for choosing [facility] SPECIAL[facility] INFUSION SERVICES  for your care. Our goal is always to provide you with excellent care. Hearing back from our patients is one way we can continue to improve our services. Please take a few minutes to complete the written survey that you may receive in the mail after your visit with us. Thank you!             Your Updated Medication List - Protect others around you: Learn how to safely use, store and throw away your medicines at www.disposemymeds.org.          This list is accurate as of 10/17/18  1:43 PM.  Always use your most recent med list.                   Brand Name Dispense Instructions for use Diagnosis    AIMOVIG 70 MG/ML injection   Generic drug:  erenumab-aooe      Inject 70 mg Subcutaneous every 30 days        albuterol (2.5 MG/3ML) 0.083% neb solution     1 Box    Take 1 vial (2.5 mg) by nebulization every 4 hours as needed for shortness of breath / dyspnea or wheezing        AMITRIPTYLINE HCL PO      Take 20 mg by mouth At Bedtime        cyclobenzaprine 10 MG tablet    FLEXERIL      Take 10 mg by mouth At Bedtime        EFFEXOR PO      Take 37.5 mg by mouth 2 times daily        fluticasone 50 MCG/ACT spray    FLONASE     Spray 1 spray into both nostrils daily        fluticasone-salmeterol 500-50 MCG/DOSE diskus inhaler    ADVAIR     Inhale 1 puff into the lungs 2 times daily        ibuprofen 200 MG tablet    ADVIL/MOTRIN    30 tablet    Take 1-2 tablets (200-400 mg) by mouth every 6 hours as needed for other (mild pain)    Post-op pain       ipratropium - albuterol 0.5 mg/2.5 mg/3 mL 0.5-2.5 (3) MG/3ML neb solution    DUONEB    1 Box    Take 1 vial (3 mLs) by nebulization 3 times daily        nortriptyline 50 MG capsule    PAMELOR     Take on capsule at betime        omalizumab 150 MG injection    XOLAIR     Inject 150 mg Subcutaneous See Admin Instructions        * oxyCODONE IR 5 MG tablet    ROXICODONE    15 tablet    Take 1-2 tablets (5-10 mg) by mouth every 4 hours as needed for pain        * oxyCODONE IR 5 MG tablet    ROXICODONE    10 tablet    Take 1-2 tablets (5-10 mg) by mouth every 3 hours as needed for other (Moderate to Severe Pain)    Post-op pain       oxyCODONE-acetaminophen 5-325 MG per tablet    PERCOCET    12 tablet    Take 1-2 tablets by mouth every 4 hours as needed for pain        pantoprazole 40 MG EC tablet    PROTONIX     Take 40 mg by mouth every morning        SINGULAIR PO      Take 10 mg by mouth At Bedtime        Vitamin D3 3000 units Tabs      Take 5,000 Units by mouth daily        XELJANZ 5 MG tablet   Generic drug:  tofacitinib      Take 5 mg by mouth 2 times daily        ZOLOFT PO      Take 200 mg by mouth At Bedtime        ZOMIG PO      Take 5 mg by mouth at onset of headache for migraine        ZONEGRAN PO      Take 100 mg by mouth At Bedtime        * Notice:  This list has 2 medication(s) that are the same as other medications prescribed for you. Read the directions carefully, and ask your doctor or other care provider to review them with you.

## 2018-10-17 NOTE — PROGRESS NOTES
Infusion Nursing Note:  Holli Thacker presents today for Xolair.    Patient seen by provider today: No   present during visit today: Not Applicable.    Note: N/A.    Intravenous Access:  No Intravenous access/labs at this visit.    Treatment Conditions:  Not Applicable.      Post Infusion Assessment:  Patient tolerated injection without incident.  Patient observed for 30 minutes post Xolair per protocol.    Discharge Plan:   Discharge instructions reviewed with: Patient.  Patient and/or family verbalized understanding of discharge instructions and all questions answered.  AVS to patient via [employer].  Patient will call for next appointment.   Patient discharged in stable condition accompanied by: self.  Departure Mode: Ambulatory.    Cheryl Billings RN

## 2018-11-21 ENCOUNTER — INFUSION THERAPY VISIT (OUTPATIENT)
Dept: INFUSION THERAPY | Facility: CLINIC | Age: 43
End: 2018-11-21
Attending: ALLERGY & IMMUNOLOGY
Payer: COMMERCIAL

## 2018-11-21 VITALS
TEMPERATURE: 98.4 F | RESPIRATION RATE: 16 BRPM | OXYGEN SATURATION: 99 % | SYSTOLIC BLOOD PRESSURE: 116 MMHG | DIASTOLIC BLOOD PRESSURE: 69 MMHG

## 2018-11-21 DIAGNOSIS — L50.1 URTICARIA, IDIOPATHIC: Primary | ICD-10-CM

## 2018-11-21 PROCEDURE — 96372 THER/PROPH/DIAG INJ SC/IM: CPT

## 2018-11-21 PROCEDURE — 25000128 H RX IP 250 OP 636: Performed by: INTERNAL MEDICINE

## 2018-11-21 RX ADMIN — OMALIZUMAB 300 MG: 202.5 INJECTION, SOLUTION SUBCUTANEOUS at 12:54

## 2018-11-21 NOTE — PROGRESS NOTES
Infusion Nursing Note:  Holli Thacker presents today for Xolair.    Patient seen by provider today: No   present during visit today: Not Applicable.    Note: Patient has a red, itchy rash around her right ankle that started 2 days ago.     Intravenous Access:  No Intravenous access/labs at this visit.    Treatment Conditions:  Not Applicable.      Post Infusion Assessment:  Patient tolerated injection without incident.  Patient observed for 30 minutes post Xolair per protocol.    Discharge Plan:   Discharge instructions reviewed with: Patient.  Patient and/or family verbalized understanding of discharge instructions and all questions answered.  AVS to patient via [employer].  Patient will return 12/12/18 for next appointment.   Patient discharged in stable condition accompanied by: self.  Departure Mode: Ambulatory.    Cheryl Billings RN

## 2018-11-21 NOTE — MR AVS SNAPSHOT
After Visit Summary   11/21/2018    Holli Thacker    MRN: [id number]           Patient Information     Date Of Birth          1975        Visit Information        Provider Department      11/21/2018 12:30 PM RH INFUSION CHAIR 12 [facility] Infusion Services        Today's Diagnoses     Urticaria, idiopathic    -  1       Follow-ups after your visit        Your next 10 appointments already scheduled     Dec 12, 2018 11:00 AM CST   Level 1 with RH INFUSION CHAIR 7   [facility] Infusion Services ([facility])    [facility]  [address]  [facility] [id number] [phone]              Who to contact     If you have questions or need follow up information about today's clinic visit or your schedule please contact [facility] INFUSION SERVICES directly at [phone].  Normal or non-critical lab and imaging results will be communicated to you by [employer], letter or phone within 4 business days after the clinic has received the results. If you do not hear from us within 7 days, please contact the clinic through [employer] or phone. If you have a critical or abnormal lab result, we will notify you by phone as soon as possible.  Submit refill requests through [employer] or call your pharmacy and they will forward the refill request to us. Please allow 3 business days for your refill to be completed.          Additional Information About Your Visit        MyChart Information     [employer] gives you secure access to your electronic health record. If you see a primary care provider, you can also send messages to your care team and make appointments. If you have questions, please call your primary care clinic.  If you do not have a primary care provider, please call [phone] and they will assist you.        Care EveryWhere ID     This is your Care EveryWhere ID. This could be used by other organizations  to access your [place] medical records  [id number]        Your Vitals Were     Temperature Respirations Pulse Oximetry             98.4  F (36.9  C) (Tympanic) 16 99%          Blood Pressure from Last 3 Encounters:   11/21/18 116/69   10/17/18 108/69   09/27/18 106/57    Weight from Last 3 Encounters:   03/15/18 74.8 kg (165 lb)   11/29/17 83.3 kg (183 lb 9.6 oz)   03/10/17 90.7 kg (200 lb)              Today, you had the following     No orders found for display       Primary Care Provider Office Phone # Fax #    Hansa Boles [phone]       [place] [address]        Equal Access to Services     SHANTA KAT : Toni murrietao Sofrankie, waaxda luqadaha, qaybta kaalmada adeegyada, anaid nunes. So [facility] [phone].    ATENCIÓN: Si habla español, tiene a trinidad disposición servicios gratuitos de asistencia lingüística. Llame al [phone].    We comply with applicable federal civil rights laws and [place] laws. We do not discriminate on the basis of race, color, national origin, age, disability, sex, sexual orientation, or gender identity.            Thank you!     Thank you for choosing [facility] INFUSION SERVICES  for your care. Our goal is always to provide you with excellent care. Hearing back from our patients is one way we can continue to improve our services. Please take a few minutes to complete the written survey that you may receive in the mail after your visit with us. Thank you!             Your Updated Medication List - Protect others around you: Learn how to safely use, store and throw away your medicines at www.disposemymeds.org.          This list is accurate as of 11/21/18  1:06 PM.  Always use your most recent med list.                   Brand Name Dispense Instructions for use Diagnosis    AIMOVIG 70 MG/ML injection   Generic drug:  erenumab-aooe      Inject 70 mg Subcutaneous  every 30 days        albuterol (2.5 MG/3ML) 0.083% neb solution     1 Box    Take 1 vial (2.5 mg) by nebulization every 4 hours as needed for shortness of breath / dyspnea or wheezing        AMITRIPTYLINE HCL PO      Take 20 mg by mouth At Bedtime        cyclobenzaprine 10 MG tablet    FLEXERIL     Take 10 mg by mouth At Bedtime        EFFEXOR PO      Take 37.5 mg by mouth 2 times daily        fluticasone 50 MCG/ACT spray    FLONASE     Spray 1 spray into both nostrils daily        fluticasone-salmeterol 500-50 MCG/DOSE diskus inhaler    ADVAIR     Inhale 1 puff into the lungs 2 times daily        ibuprofen 200 MG tablet    ADVIL/MOTRIN    30 tablet    Take 1-2 tablets (200-400 mg) by mouth every 6 hours as needed for other (mild pain)    Post-op pain       ipratropium - albuterol 0.5 mg/2.5 mg/3 mL 0.5-2.5 (3) MG/3ML neb solution    DUONEB    1 Box    Take 1 vial (3 mLs) by nebulization 3 times daily        nortriptyline 50 MG capsule    PAMELOR     Take on capsule at betime        omalizumab 150 MG injection    XOLAIR     Inject 150 mg Subcutaneous See Admin Instructions        * oxyCODONE 5 MG tablet    ROXICODONE    15 tablet    Take 1-2 tablets (5-10 mg) by mouth every 4 hours as needed for pain        * oxyCODONE 5 MG tablet    ROXICODONE    10 tablet    Take 1-2 tablets (5-10 mg) by mouth every 3 hours as needed for other (Moderate to Severe Pain)    Post-op pain       oxyCODONE-acetaminophen 5-325 MG tablet    PERCOCET    12 tablet    Take 1-2 tablets by mouth every 4 hours as needed for pain        pantoprazole 40 MG EC tablet    PROTONIX     Take 40 mg by mouth every morning        SINGULAIR PO      Take 10 mg by mouth At Bedtime        Vitamin D3 3000 units Tabs      Take 5,000 Units by mouth daily        XELJANZ 5 MG tablet   Generic drug:  tofacitinib      Take 5 mg by mouth 2 times daily        ZOLOFT PO      Take 200 mg by mouth At Bedtime        ZOMIG PO      Take 5 mg by mouth at onset of headache  for migraine        ZONEGRAN PO      Take 100 mg by mouth At Bedtime        * Notice:  This list has 2 medication(s) that are the same as other medications prescribed for you. Read the directions carefully, and ask your doctor or other care provider to review them with you.

## 2018-12-13 ENCOUNTER — INFUSION THERAPY VISIT (OUTPATIENT)
Dept: INFUSION THERAPY | Facility: CLINIC | Age: 43
End: 2018-12-13
Attending: ALLERGY & IMMUNOLOGY
Payer: COMMERCIAL

## 2018-12-13 VITALS — DIASTOLIC BLOOD PRESSURE: 61 MMHG | SYSTOLIC BLOOD PRESSURE: 110 MMHG | RESPIRATION RATE: 16 BRPM | TEMPERATURE: 97.4 F

## 2018-12-13 DIAGNOSIS — L50.1 URTICARIA, IDIOPATHIC: Primary | ICD-10-CM

## 2018-12-13 PROCEDURE — 96372 THER/PROPH/DIAG INJ SC/IM: CPT

## 2018-12-13 PROCEDURE — 25000128 H RX IP 250 OP 636: Performed by: INTERNAL MEDICINE

## 2018-12-13 RX ADMIN — OMALIZUMAB 300 MG: 202.5 INJECTION, SOLUTION SUBCUTANEOUS at 14:19

## 2018-12-13 NOTE — PROGRESS NOTES
Infusion Nursing Note:  Holli Thacker presents today for xolair.    Patient seen by provider today: No   present during visit today: Not Applicable.    Note: N/A.    Intravenous Access:  No Intravenous access/labs at this visit.    Treatment Conditions:  Not Applicable.      Post Infusion Assessment:  Patient tolerated injection without incident.  Patient observed for 30 minutes post xolair per protocol.    Discharge Plan:   [facility]  Patient will return 1/3/18 for next appointment.   Patient discharged in stable condition accompanied by: self.  Departure Mode: Ambulatory.    Mena Monte RN

## 2019-01-03 ENCOUNTER — INFUSION THERAPY VISIT (OUTPATIENT)
Dept: INFUSION THERAPY | Facility: CLINIC | Age: 44
End: 2019-01-03
Attending: ALLERGY & IMMUNOLOGY
Payer: COMMERCIAL

## 2019-01-03 VITALS
TEMPERATURE: 98.8 F | RESPIRATION RATE: 16 BRPM | SYSTOLIC BLOOD PRESSURE: 124 MMHG | HEART RATE: 86 BPM | OXYGEN SATURATION: 98 % | DIASTOLIC BLOOD PRESSURE: 70 MMHG

## 2019-01-03 DIAGNOSIS — L50.1 URTICARIA, IDIOPATHIC: Primary | ICD-10-CM

## 2019-01-03 PROCEDURE — 25000128 H RX IP 250 OP 636: Performed by: INTERNAL MEDICINE

## 2019-01-03 PROCEDURE — 96372 THER/PROPH/DIAG INJ SC/IM: CPT

## 2019-01-03 RX ADMIN — OMALIZUMAB 300 MG: 202.5 INJECTION, SOLUTION SUBCUTANEOUS at 14:16

## 2019-01-03 ASSESSMENT — PAIN SCALES - GENERAL: PAINLEVEL: NO PAIN (0)

## 2019-01-03 NOTE — PROGRESS NOTES
Infusion Nursing Note:  Holli Thacker presents today for Xolair.    Patient seen by provider today: No   present during visit today: Not Applicable.    Note: Indicates she starts to get hives just prior to her next injection.  Denies SE to Xolair.  Feels like it is effective.    Intravenous Access:  No Intravenous access/labs at this visit.    Treatment Conditions:  Not Applicable.      Post Infusion Assessment:  Patient tolerated injection without incident.  Patient observed for 30 minutes post Xolair per protocol.    Discharge Plan:   Discharge instructions reviewed with: Patient.  Patient discharged in stable condition accompanied by: self.  Departure Mode: Ambulatory.  Next injection scheduled for 1/24/19.    DARYL SETHI RN

## 2019-01-28 ENCOUNTER — INFUSION THERAPY VISIT (OUTPATIENT)
Dept: INFUSION THERAPY | Facility: CLINIC | Age: 44
End: 2019-01-28
Attending: ALLERGY & IMMUNOLOGY
Payer: COMMERCIAL

## 2019-01-28 VITALS
TEMPERATURE: 98.7 F | HEART RATE: 92 BPM | RESPIRATION RATE: 16 BRPM | SYSTOLIC BLOOD PRESSURE: 129 MMHG | OXYGEN SATURATION: 97 % | DIASTOLIC BLOOD PRESSURE: 77 MMHG

## 2019-01-28 DIAGNOSIS — L50.1 URTICARIA, IDIOPATHIC: Primary | ICD-10-CM

## 2019-01-28 PROCEDURE — 96372 THER/PROPH/DIAG INJ SC/IM: CPT

## 2019-01-28 PROCEDURE — 25000128 H RX IP 250 OP 636: Performed by: INTERNAL MEDICINE

## 2019-01-28 RX ADMIN — OMALIZUMAB 300 MG: 150 INJECTION, SOLUTION SUBCUTANEOUS at 11:06

## 2019-01-28 NOTE — PROGRESS NOTES
Infusion Nursing Note:  Holli ROOSEVELT Linoey Kedar presents today for Xolair.    Patient seen by provider today: No   present during visit today: Not Applicable.    Note: N/A.    Intravenous Access:  No Intravenous access/labs at this visit.    Treatment Conditions:  Not Applicable.      Post Infusion Assessment:  Patient tolerated injection without incident.  Site patent and intact, free from redness, edema or discomfort.  Patient observed for 30 minutes post Xolair.    Discharge Plan:   Copy of AVS reviewed with patient and/or family.  Patient will return 2/14 for next appointment.  Patient discharged in stable condition accompanied by: self.  Departure Mode: Ambulatory.    Mercedes Breaux RN

## 2019-02-15 ENCOUNTER — INFUSION THERAPY VISIT (OUTPATIENT)
Dept: INFUSION THERAPY | Facility: CLINIC | Age: 44
End: 2019-02-15
Attending: ALLERGY & IMMUNOLOGY
Payer: COMMERCIAL

## 2019-02-15 VITALS
HEART RATE: 93 BPM | RESPIRATION RATE: 18 BRPM | TEMPERATURE: 98.2 F | DIASTOLIC BLOOD PRESSURE: 73 MMHG | OXYGEN SATURATION: 97 % | SYSTOLIC BLOOD PRESSURE: 122 MMHG

## 2019-02-15 DIAGNOSIS — L50.1 URTICARIA, IDIOPATHIC: Primary | ICD-10-CM

## 2019-02-15 PROCEDURE — 96372 THER/PROPH/DIAG INJ SC/IM: CPT

## 2019-02-15 PROCEDURE — 25000128 H RX IP 250 OP 636: Performed by: INTERNAL MEDICINE

## 2019-02-15 RX ADMIN — OMALIZUMAB 300 MG: 150 INJECTION, SOLUTION SUBCUTANEOUS at 11:14

## 2019-02-15 ASSESSMENT — PAIN SCALES - GENERAL: PAINLEVEL: MILD PAIN (3)

## 2019-02-15 NOTE — PROGRESS NOTES
Infusion Nursing Note:  Holli ROOSEVELT Linoneida Thacker presents today for Xolair.    Patient seen by provider today: No   present during visit today: Not Applicable.    Note: Has c/o a potential sinus infection.  Has an appointment to have this evaluated later today.  Has clear nasal drainage and sinus congestion.  Denies other SE.  Feels like Xolair is effective for the hives.    Intravenous Access:  No Intravenous access/labs at this visit.    Treatment Conditions:  Not Applicable.      Post Infusion Assessment:  Patient tolerated injection without incident.    Discharge Plan:   Discharge instructions reviewed with: Patient.  Patient discharged in stable condition accompanied by: self.  Departure Mode: Ambulatory.  Next injection scheduled for 3/7/19.    DARYL SETHI RN

## 2019-03-06 ENCOUNTER — HOSPITAL ENCOUNTER (EMERGENCY)
Facility: CLINIC | Age: 44
Discharge: HOME | End: 2019-03-06
Admitting: PHYSICIAN ASSISTANT
Payer: COMMERCIAL

## 2019-03-06 ENCOUNTER — APPOINTMENT (OUTPATIENT)
Dept: CT IMAGING | Facility: CLINIC | Age: 44
End: 2019-03-06
Attending: PHYSICIAN ASSISTANT
Payer: COMMERCIAL

## 2019-03-06 ENCOUNTER — APPOINTMENT (OUTPATIENT)
Dept: ULTRASOUND IMAGING | Facility: CLINIC | Age: 44
End: 2019-03-06
Attending: PHYSICIAN ASSISTANT
Payer: COMMERCIAL

## 2019-03-06 VITALS
SYSTOLIC BLOOD PRESSURE: 114 MMHG | HEIGHT: 66 IN | DIASTOLIC BLOOD PRESSURE: 69 MMHG | TEMPERATURE: 98.2 F | OXYGEN SATURATION: 96 % | RESPIRATION RATE: 16 BRPM | WEIGHT: 179 LBS | HEART RATE: 71 BPM | BODY MASS INDEX: 28.77 KG/M2

## 2019-03-06 DIAGNOSIS — R10.11 ABDOMINAL PAIN, RIGHT UPPER QUADRANT: ICD-10-CM

## 2019-03-06 LAB
ALBUMIN SERPL-MCNC: 3.2 G/DL (ref 3.4–5)
ALBUMIN UR-MCNC: NEGATIVE MG/DL
ALP SERPL-CCNC: 117 U/L (ref 40–150)
ALT SERPL W P-5'-P-CCNC: 24 U/L (ref 0–50)
AMORPH CRY #/AREA URNS HPF: ABNORMAL /HPF
ANION GAP SERPL CALCULATED.3IONS-SCNC: 4 MMOL/L (ref 3–14)
APPEARANCE UR: ABNORMAL
AST SERPL W P-5'-P-CCNC: 20 U/L (ref 0–45)
BASOPHILS # BLD AUTO: 0.1 10E9/L (ref 0–0.2)
BASOPHILS NFR BLD AUTO: 1.2 %
BILIRUB SERPL-MCNC: 0.1 MG/DL (ref 0.2–1.3)
BILIRUB UR QL STRIP: NEGATIVE
BUN SERPL-MCNC: 13 MG/DL (ref 7–30)
CALCIUM SERPL-MCNC: 8 MG/DL (ref 8.5–10.1)
CHLORIDE SERPL-SCNC: 113 MMOL/L (ref 94–109)
CO2 SERPL-SCNC: 24 MMOL/L (ref 20–32)
COLOR UR AUTO: YELLOW
CREAT SERPL-MCNC: 0.89 MG/DL (ref 0.52–1.04)
DIFFERENTIAL METHOD BLD: ABNORMAL
EOSINOPHIL # BLD AUTO: 1.4 10E9/L (ref 0–0.7)
EOSINOPHIL NFR BLD AUTO: 15.1 %
ERYTHROCYTE [DISTWIDTH] IN BLOOD BY AUTOMATED COUNT: 16.2 % (ref 10–15)
GFR SERPL CREATININE-BSD FRML MDRD: 79 ML/MIN/{1.73_M2}
GLUCOSE SERPL-MCNC: 97 MG/DL (ref 70–99)
GLUCOSE UR STRIP-MCNC: NEGATIVE MG/DL
HCT VFR BLD AUTO: 42 % (ref 35–47)
HGB BLD-MCNC: 13 G/DL (ref 11.7–15.7)
HGB UR QL STRIP: NEGATIVE
IMM GRANULOCYTES # BLD: 0 10E9/L (ref 0–0.4)
IMM GRANULOCYTES NFR BLD: 0.2 %
KETONES UR STRIP-MCNC: NEGATIVE MG/DL
LEUKOCYTE ESTERASE UR QL STRIP: ABNORMAL
LIPASE SERPL-CCNC: 161 U/L (ref 73–393)
LYMPHOCYTES # BLD AUTO: 3.1 10E9/L (ref 0.8–5.3)
LYMPHOCYTES NFR BLD AUTO: 33 %
MCH RBC QN AUTO: 28.3 PG (ref 26.5–33)
MCHC RBC AUTO-ENTMCNC: 31 G/DL (ref 31.5–36.5)
MCV RBC AUTO: 92 FL (ref 78–100)
MONOCYTES # BLD AUTO: 0.6 10E9/L (ref 0–1.3)
MONOCYTES NFR BLD AUTO: 6.9 %
NEUTROPHILS # BLD AUTO: 4.1 10E9/L (ref 1.6–8.3)
NEUTROPHILS NFR BLD AUTO: 43.6 %
NITRATE UR QL: NEGATIVE
NRBC # BLD AUTO: 0 10*3/UL
NRBC BLD AUTO-RTO: 0 /100
PH UR STRIP: 7 PH (ref 5–7)
PLATELET # BLD AUTO: 370 10E9/L (ref 150–450)
POTASSIUM SERPL-SCNC: 3.9 MMOL/L (ref 3.4–5.3)
PROT SERPL-MCNC: 6.7 G/DL (ref 6.8–8.8)
RBC # BLD AUTO: 4.59 10E12/L (ref 3.8–5.2)
RBC #/AREA URNS AUTO: 4 /HPF (ref 0–2)
SODIUM SERPL-SCNC: 141 MMOL/L (ref 133–144)
SOURCE: ABNORMAL
SP GR UR STRIP: 1.01 (ref 1–1.03)
SQUAMOUS #/AREA URNS AUTO: 9 /HPF (ref 0–1)
UROBILINOGEN UR STRIP-MCNC: 0 MG/DL (ref 0–2)
WBC # BLD AUTO: 9.3 10E9/L (ref 4–11)
WBC #/AREA URNS AUTO: 2 /HPF (ref 0–5)

## 2019-03-06 PROCEDURE — 25000125 ZZHC RX 250: Performed by: PHYSICIAN ASSISTANT

## 2019-03-06 PROCEDURE — 25000128 H RX IP 250 OP 636: Performed by: PHYSICIAN ASSISTANT

## 2019-03-06 PROCEDURE — 25000132 ZZH RX MED GY IP 250 OP 250 PS 637: Performed by: PHYSICIAN ASSISTANT

## 2019-03-06 PROCEDURE — 96374 THER/PROPH/DIAG INJ IV PUSH: CPT

## 2019-03-06 PROCEDURE — 96361 HYDRATE IV INFUSION ADD-ON: CPT

## 2019-03-06 PROCEDURE — 81001 URINALYSIS AUTO W/SCOPE: CPT | Performed by: PHYSICIAN ASSISTANT

## 2019-03-06 PROCEDURE — 74176 CT ABD & PELVIS W/O CONTRAST: CPT

## 2019-03-06 PROCEDURE — 36415 COLL VENOUS BLD VENIPUNCTURE: CPT | Performed by: PHYSICIAN ASSISTANT

## 2019-03-06 PROCEDURE — 99285 EMERGENCY DEPT VISIT HI MDM: CPT | Mod: 25

## 2019-03-06 PROCEDURE — 80053 COMPREHEN METABOLIC PANEL: CPT | Performed by: PHYSICIAN ASSISTANT

## 2019-03-06 PROCEDURE — 96375 TX/PRO/DX INJ NEW DRUG ADDON: CPT

## 2019-03-06 PROCEDURE — 76705 ECHO EXAM OF ABDOMEN: CPT

## 2019-03-06 PROCEDURE — 83690 ASSAY OF LIPASE: CPT | Performed by: PHYSICIAN ASSISTANT

## 2019-03-06 PROCEDURE — 85025 COMPLETE CBC W/AUTO DIFF WBC: CPT | Performed by: PHYSICIAN ASSISTANT

## 2019-03-06 RX ORDER — KETOROLAC TROMETHAMINE 15 MG/ML
15 INJECTION, SOLUTION INTRAMUSCULAR; INTRAVENOUS ONCE
Status: COMPLETED | OUTPATIENT
Start: 2019-03-06 | End: 2019-03-06

## 2019-03-06 RX ORDER — ONDANSETRON 2 MG/ML
4 INJECTION INTRAMUSCULAR; INTRAVENOUS ONCE
Status: COMPLETED | OUTPATIENT
Start: 2019-03-06 | End: 2019-03-06

## 2019-03-06 RX ORDER — HYDROMORPHONE HYDROCHLORIDE 1 MG/ML
0.5 INJECTION, SOLUTION INTRAMUSCULAR; INTRAVENOUS; SUBCUTANEOUS
Status: COMPLETED | OUTPATIENT
Start: 2019-03-06 | End: 2019-03-06

## 2019-03-06 RX ADMIN — KETOROLAC TROMETHAMINE 15 MG: 15 INJECTION, SOLUTION INTRAMUSCULAR; INTRAVENOUS at 16:45

## 2019-03-06 RX ADMIN — SODIUM CHLORIDE 1000 ML: 9 INJECTION, SOLUTION INTRAVENOUS at 16:45

## 2019-03-06 RX ADMIN — Medication 0.5 MG: at 16:39

## 2019-03-06 RX ADMIN — ONDANSETRON 4 MG: 2 INJECTION INTRAMUSCULAR; INTRAVENOUS at 16:45

## 2019-03-06 RX ADMIN — LIDOCAINE HYDROCHLORIDE 30 ML: 20 SOLUTION ORAL; TOPICAL at 18:47

## 2019-03-06 ASSESSMENT — ENCOUNTER SYMPTOMS
FLANK PAIN: 0
APPETITE CHANGE: 1
NAUSEA: 1
DIARRHEA: 0
SHORTNESS OF BREATH: 0
VOMITING: 0
ABDOMINAL PAIN: 1
DYSURIA: 0
CONSTIPATION: 0

## 2019-03-06 ASSESSMENT — MIFFLIN-ST. JEOR: SCORE: 1483.69

## 2019-03-06 NOTE — ED AVS SNAPSHOT
[facility] Emergency Department  [address]  [facility] [address]  Phone:  [phone]  Fax:  [phone]                                    Holli Thacker   MRN: [id number]    Department:  [facility] Emergency Department   Date of Visit:  3/6/2019           After Visit Summary Signature Page    I have received my discharge instructions, and my questions have been answered. I have discussed any challenges I see with this plan with the nurse or doctor.    ..........................................................................................................................................  Patient/Patient Representative Signature      ..........................................................................................................................................  Patient Representative Print Name and Relationship to Patient    ..................................................               ................................................  Date                                   Time    ..........................................................................................................................................  Reviewed by Signature/Title    ...................................................              ..............................................  Date                                               Time          22EPIC Rev 08/18

## 2019-03-06 NOTE — ED TRIAGE NOTES
Patient presents with right-sided abdominal pain that goes into her back for the past 2 days. Patient states it is worse with eating.

## 2019-03-06 NOTE — ED PROVIDER NOTES
History     Chief Complaint:  Abdominal Pain    HPI   Holli Thacker is a 43 year old female with a history of Arnold-Chiari malformation who presents with abdominal pain. The patient notes she has had epigastric pain that radiates to her back at times. She notes the pain is stronger on the right. The patient notes the pain is worse when she eats. The patient notes the pain has been worse. The patient also notes nausea. The patient also notes some right flank tenderness. The patient denies vomiting, fever, constipation, diarrhea, or dysuria. The patient notes she has had a hysterectomy. The patient notes she has a history of kidney stones and a family history of gall stones. The patient denies chest pain or shortness of breath.     Allergies:  Azithromycin  Erythromycin  Verapamil     Medications:    Albuterol   Amitriptyline   Aimovig  Duoneb  Advair  Singulair  protonix  Zoloft  Effexor    Past Medical History:    Anxiety   Arnold-Chiari malformation, type I    Depressive disorder   Fibromyalgia   Migraine   PONV (postoperative nausea and vomiting)   Rheumatoid arthritis flare    Uncomplicated asthma  Utricaria  Chaiari malformation    Past Surgical History:    Hysterectomy  Ovarian removal, bilateral    Family History:    Mother: gall stones     Social History:  The patient was accompanied to the ED by .  Smoking Status: Current Every Day Smoker   Smokeless Tobacco: Never Used  Alcohol Use: No  Marital Status:       Review of Systems   Constitutional: Positive for appetite change.   Respiratory: Negative for shortness of breath.    Cardiovascular: Negative for chest pain.   Gastrointestinal: Positive for abdominal pain and nausea. Negative for constipation, diarrhea and vomiting.   Genitourinary: Negative for dysuria and flank pain.   All other systems reviewed and are negative.    Physical Exam     Patient Vitals for the past 24 hrs:   BP Temp Temp src Pulse Heart Rate Resp SpO2 Height  "Weight   03/06/19 1859 114/69 98.2  F (36.8  C) Oral 71 71 16 96 % -- --   03/06/19 1740 92/65 98.2  F (36.8  C) Oral -- 75 -- 94 % -- --   03/06/19 1601 114/71 99  F (37.2  C) Temporal -- 79 20 97 % 1.676 m (5' 6\") 81.2 kg (179 lb)     Physical Exam  General: Alert and cooperative with exam. Resting comfortably on gurney  Head:  Scalp is NC/AT  Eyes:  No scleral icterus, PERRL,   ENT:  The external nose and ears are normal.   Neck:  Normal range of motion without rigidity.  CV:  Regular rate and rhythm    No pathologic murmur, rubs, or gallops.  Resp:  Breath sounds are clear bilaterally.  No crackles, wheezes, rhonchi.    Non-labored, no retractions or accessory muscle use  GI:  Abdomen is soft, no distension,  no masses. No peritoneal signs.  Bowel sounds present in all quadrants Diffuse abdominal tenderness most pronounced in RUQ.  :  Minimal right flank tenderness.  No suprapubic tenderness.  MS:  No lower extremity edema or asymmetric calf swelling.  Skin:  Warm and dry, No rash or lesions noted.  Neuro: Oriented x 3. No gross motor deficits.  Psych: Awake. Alert. Normal affect. Appropriate interactions.    Emergency Department Course   Imaging:  Radiology findings were communicated with the patient who voiced understanding of the findings.    CT Abdomen Pelvis w/o Contrast  1. 4 mm nonobstructing stone mid right kidney, no ureteral stones or  hydronephrosis bilaterally.  2. Small hiatal hernia.  3. No acute process demonstrated in the abdomen and pelvis.  Reading per radiology     US Abdomen Limited  No cholelithiasis or cholecystitis.  REJI JONES MD  Reading per radiology     Laboratory:  Laboratory findings were communicated with the patient who voiced understanding of the findings.    CBC: WBC 9.3, HGB 13.0,   CMP: chloride 133(H), calcium 8.9(L), Albumin 3.2(L), protein total 6.2(L)o/w WNL (Creatinine 0.89)  Lipase: 161    UA with microscopic: leukocyte esterase small(A), RBC 4(H), squamous " epithelial 9(H), amorphous crystals few (A) o/w WNL    Interventions:  1639 Dilaudid 0.5 mg IV  1645 Toradol 15 mg IV  1645 Zofran 4 mg IV  1645 NS 1000 mL IV  GI Cocktail 30 mL Oral    Emergency Department Course:    1617 Nursing notes and vitals reviewed.    1620 I performed an exam of the patient as documented above.     1635 The patient provided a urine sample here in the emergency department. This was sent for laboratory testing, findings above.    1636 IV was inserted and blood was drawn for laboratory testing, results above.    1655 The patient was sent for a US Abdomen Limited while in the emergency department, results above.     1724 I returned to update the patient. She was amenable to further study with CT.     1819 The patient was sent for a CT Abdomen Pelvis while in the emergency department, results above.     1835 I personally reviewed the laboratory and imaging results with the patient and answered all related questions prior to discharge.    Impression & Plan      Medical Decision Making:  Holli Thacker is a 43 year old female who presents to the emergency department today for evaluation of abdominal pain.  Patient history and records reviewed.  On examination, the patient is well-appearing with normal vitals.  Broad differential was considered.  Workup today is reassuring, with labs obtained as above.  Right upper quadrant ultrasound was performed which shows no evidence of gallbladder disease.  Given her flank tenderness and concern for possible kidney stone I did perform a CT scan of the abdomen and pelvis which showed small stone within the right kidney not contributing to symptoms, as well as known hiatal hernia but otherwise unremarkable.  She does not have any chest pain, shortness of breath, or cough and therefore I doubt referred symptoms from cardiac or pulmonary process.  The patient has had both ovaries removed previously and has no vaginal symptoms.  Suspect gastritis versus  peptic ulcer disease is most likely.  The patient is already on PPI and H2 blocker, and I recommended that she continue these and follow-up with her primary care provider in 2 days if symptoms not improving.  Recommended Tylenol as needed for pain.  Offered prescription for Zofran for nausea, however the patient states she is not significantly nauseous and declined.  Stressed the importance of returning to the emergency department if any new or worsening symptoms develop.    Diagnosis:    ICD-10-CM    1. Abdominal pain, right upper quadrant R10.11         Disposition:   The patient is discharged to home.    Discharge Medications:  No discharge medication.     Scribe Disclosure:  I, Ester Israel, am serving as a scribe at 4:28 PM on 3/6/2019 to document services personally performed by Tenzin Moscoso based on my observations and the provider's statements to me.  [facility] EMERGENCY DEPARTMENT       Tenzin Moscoso PA-C  03/06/19 1919

## 2019-03-12 ENCOUNTER — INFUSION THERAPY VISIT (OUTPATIENT)
Dept: INFUSION THERAPY | Facility: CLINIC | Age: 44
End: 2019-03-12
Attending: ALLERGY & IMMUNOLOGY
Payer: COMMERCIAL

## 2019-03-12 VITALS
TEMPERATURE: 98.4 F | RESPIRATION RATE: 16 BRPM | DIASTOLIC BLOOD PRESSURE: 70 MMHG | HEART RATE: 90 BPM | OXYGEN SATURATION: 97 % | SYSTOLIC BLOOD PRESSURE: 119 MMHG

## 2019-03-12 DIAGNOSIS — L50.1 URTICARIA, IDIOPATHIC: Primary | ICD-10-CM

## 2019-03-12 PROCEDURE — 25000128 H RX IP 250 OP 636: Performed by: INTERNAL MEDICINE

## 2019-03-12 PROCEDURE — 96372 THER/PROPH/DIAG INJ SC/IM: CPT

## 2019-03-12 RX ORDER — PREDNISONE 20 MG/1
10 TABLET ORAL DAILY
COMMUNITY
End: 2020-05-06 | Stop reason: DRUGHIGH

## 2019-03-12 RX ADMIN — OMALIZUMAB 300 MG: 150 INJECTION, SOLUTION SUBCUTANEOUS at 09:05

## 2019-03-12 NOTE — PROGRESS NOTES
Infusion Nursing Note:  Holli ROOSEVELT Thacker presents today for Xolair.    Patient seen by provider today: No   present during visit today: Not Applicable.    Note: N/A.    Intravenous Access:  No Intravenous access/labs at this visit.    Treatment Conditions:  Not Applicable.      Post Infusion Assessment:  Patient tolerated injection without incident.  Patient observed for 30 minutes post Xolair per protocol.    Discharge Plan:   Discharge instructions reviewed with: Patient.  Patient and/or family verbalized understanding of discharge instructions and all questions answered.  AVS to patient via [employer].  Patient will return 3/28/19 for next appointment.   Patient discharged in stable condition accompanied by: self.  Departure Mode: Ambulatory.    Cheryl Billings RN

## 2019-03-28 ENCOUNTER — HOSPITAL ENCOUNTER (OUTPATIENT)
Facility: CLINIC | Age: 44
Setting detail: SPECIMEN
End: 2019-03-28
Attending: PHYSICIAN ASSISTANT
Payer: COMMERCIAL

## 2019-03-28 ENCOUNTER — INFUSION THERAPY VISIT (OUTPATIENT)
Dept: INFUSION THERAPY | Facility: CLINIC | Age: 44
End: 2019-03-28
Attending: ALLERGY & IMMUNOLOGY
Payer: COMMERCIAL

## 2019-03-28 VITALS
RESPIRATION RATE: 16 BRPM | TEMPERATURE: 98.5 F | OXYGEN SATURATION: 96 % | DIASTOLIC BLOOD PRESSURE: 74 MMHG | HEART RATE: 99 BPM | SYSTOLIC BLOOD PRESSURE: 118 MMHG

## 2019-03-28 DIAGNOSIS — L50.1 URTICARIA, IDIOPATHIC: Primary | ICD-10-CM

## 2019-03-28 PROCEDURE — 25000128 H RX IP 250 OP 636: Performed by: INTERNAL MEDICINE

## 2019-03-28 PROCEDURE — 96372 THER/PROPH/DIAG INJ SC/IM: CPT

## 2019-03-28 RX ADMIN — OMALIZUMAB 300 MG: 150 INJECTION, SOLUTION SUBCUTANEOUS at 11:04

## 2019-03-28 NOTE — PROGRESS NOTES
Infusion Nursing Note:  Holli ROOSEVELT Thacker presents today for Xolair.    Patient seen by provider today: No   present during visit today: Not Applicable.    Note: N/A.    Intravenous Access:  No Intravenous access/labs at this visit.    Treatment Conditions:  Not Applicable.      Post Infusion Assessment:  Patient tolerated injection without incident.  Patient observed for 30 minutes post Xolair per protocol.       Discharge Plan:   Discharge instructions reviewed with: Patient.  Patient and/or family verbalized understanding of discharge instructions and all questions answered.  AVS to patient via [facility].  Patient will call to schedule next appointment.   Patient discharged in stable condition accompanied by: self.  Departure Mode: Ambulatory.    Cheryl Billings RN

## 2019-04-24 ENCOUNTER — INFUSION THERAPY VISIT (OUTPATIENT)
Dept: INFUSION THERAPY | Facility: CLINIC | Age: 44
End: 2019-04-24
Attending: ALLERGY & IMMUNOLOGY
Payer: COMMERCIAL

## 2019-04-24 VITALS
OXYGEN SATURATION: 97 % | RESPIRATION RATE: 16 BRPM | HEART RATE: 86 BPM | DIASTOLIC BLOOD PRESSURE: 51 MMHG | SYSTOLIC BLOOD PRESSURE: 110 MMHG | TEMPERATURE: 97.5 F

## 2019-04-24 DIAGNOSIS — L50.1 URTICARIA, IDIOPATHIC: Primary | ICD-10-CM

## 2019-04-24 PROCEDURE — 25000128 H RX IP 250 OP 636: Performed by: INTERNAL MEDICINE

## 2019-04-24 PROCEDURE — 96372 THER/PROPH/DIAG INJ SC/IM: CPT

## 2019-04-24 RX ADMIN — OMALIZUMAB 300 MG: 150 INJECTION, SOLUTION SUBCUTANEOUS at 12:22

## 2019-04-24 NOTE — PROGRESS NOTES
Infusion Nursing Note:  Holli Thacker presents today for Xolair.    Patient seen by provider today: No   present during visit today: Not Applicable.    Note: N/A.    Intravenous Access:  No Intravenous access/labs at this visit.    Treatment Conditions:  Not Applicable.      Post Infusion Assessment:  Patient tolerated injection without incident.  Patient monitored for 30 minutes post Xolair per protocol.    Discharge Plan:   Discharge instructions reviewed with: Patient.  Patient and/or family verbalized understanding of discharge instructions and all questions answered.  AVS to patient via [employer].  Patient will return 5/13/19 for next appointment.   Patient discharged in stable condition accompanied by: self.  Departure Mode: Ambulatory.    Althea Garduno RN

## 2019-05-13 ENCOUNTER — INFUSION THERAPY VISIT (OUTPATIENT)
Dept: INFUSION THERAPY | Facility: CLINIC | Age: 44
End: 2019-05-13
Attending: ALLERGY & IMMUNOLOGY
Payer: COMMERCIAL

## 2019-05-13 VITALS
RESPIRATION RATE: 16 BRPM | TEMPERATURE: 98.8 F | DIASTOLIC BLOOD PRESSURE: 75 MMHG | SYSTOLIC BLOOD PRESSURE: 122 MMHG | OXYGEN SATURATION: 96 % | HEART RATE: 65 BPM

## 2019-05-13 DIAGNOSIS — L50.1 URTICARIA, IDIOPATHIC: Primary | ICD-10-CM

## 2019-05-13 PROCEDURE — 25000128 H RX IP 250 OP 636: Performed by: INTERNAL MEDICINE

## 2019-05-13 PROCEDURE — 96372 THER/PROPH/DIAG INJ SC/IM: CPT

## 2019-05-13 RX ADMIN — OMALIZUMAB 300 MG: 150 INJECTION, SOLUTION SUBCUTANEOUS at 12:47

## 2019-05-13 NOTE — PROGRESS NOTES
Infusion Nursing Note:  Holli Thacker presents today for Xolair.    Patient seen by provider today: No   present during visit today: Not Applicable.    Note: Pt complains of generalized itching. She states itching usually increases around the time she is due for her Xolair injection.    Intravenous Access:  No Intravenous access/labs at this visit.    Treatment Conditions:  Not Applicable.      Post Infusion Assessment:  Patient tolerated injection without incident.  Patient observed for 30 minutes post Xolair per protocol.       Discharge Plan:   Discharge instructions reviewed with: Patient.  Patient and/or family verbalized understanding of discharge instructions and all questions answered.  Copy of AVS reviewed with patient and/or family.  Patient will return 6/3/19 for next appointment.  Patient discharged in stable condition accompanied by: self.  Departure Mode: Ambulatory.    Valerie Husain RN

## 2019-06-03 ENCOUNTER — INFUSION THERAPY VISIT (OUTPATIENT)
Dept: INFUSION THERAPY | Facility: CLINIC | Age: 44
End: 2019-06-03
Attending: ALLERGY & IMMUNOLOGY
Payer: COMMERCIAL

## 2019-06-03 VITALS
RESPIRATION RATE: 18 BRPM | SYSTOLIC BLOOD PRESSURE: 114 MMHG | OXYGEN SATURATION: 96 % | TEMPERATURE: 98.9 F | HEART RATE: 76 BPM | DIASTOLIC BLOOD PRESSURE: 71 MMHG

## 2019-06-03 DIAGNOSIS — L50.1 URTICARIA, IDIOPATHIC: Primary | ICD-10-CM

## 2019-06-03 PROCEDURE — 96372 THER/PROPH/DIAG INJ SC/IM: CPT

## 2019-06-03 PROCEDURE — 25000128 H RX IP 250 OP 636: Performed by: INTERNAL MEDICINE

## 2019-06-03 RX ADMIN — OMALIZUMAB 300 MG: 150 INJECTION, SOLUTION SUBCUTANEOUS at 12:25

## 2019-06-03 NOTE — PROGRESS NOTES
Infusion Nursing Note:  Holli ROOSEVELT Linoey Kedar presents today for xolair.    Patient seen by provider today: No   present during visit today: Not Applicable.    Note: patient had fallen walking up a bleacher and sprained her foot and injured her knee.    Intravenous Access:  No Intravenous access/labs at this visit.    Treatment Conditions:  Not Applicable.      Post Infusion Assessment:  Patient tolerated injection without incident.       Discharge Plan:   Copy of AVS reviewed with patient and/or family.  Patient will return 6/19/19 for next appointment.  Patient discharged in stable condition accompanied by: self.  Departure Mode: Ambulatory.    Julia Huertas RN

## 2019-06-18 ENCOUNTER — INFUSION THERAPY VISIT (OUTPATIENT)
Dept: INFUSION THERAPY | Facility: CLINIC | Age: 44
End: 2019-06-18
Attending: ALLERGY & IMMUNOLOGY
Payer: COMMERCIAL

## 2019-06-18 VITALS
HEART RATE: 87 BPM | OXYGEN SATURATION: 96 % | SYSTOLIC BLOOD PRESSURE: 115 MMHG | TEMPERATURE: 98.5 F | DIASTOLIC BLOOD PRESSURE: 67 MMHG | RESPIRATION RATE: 16 BRPM

## 2019-06-18 DIAGNOSIS — L50.1 URTICARIA, IDIOPATHIC: Primary | ICD-10-CM

## 2019-06-18 PROCEDURE — 96372 THER/PROPH/DIAG INJ SC/IM: CPT

## 2019-06-18 PROCEDURE — 25000128 H RX IP 250 OP 636: Performed by: INTERNAL MEDICINE

## 2019-06-18 RX ADMIN — OMALIZUMAB 300 MG: 150 INJECTION, SOLUTION SUBCUTANEOUS at 15:15

## 2019-06-18 NOTE — PROGRESS NOTES
Infusion Nursing Note:  Holli ROOSEVELT Thacker presents today for Xolair.    Patient seen by provider today: No   present during visit today: Not Applicable.    Note: N/A.    Intravenous Access:  No Intravenous access/labs at this visit.    Treatment Conditions:  Not Applicable.      Post Infusion Assessment:  Patient tolerated injection without incident.  Patient observed for 30 minutes post xolair per protocol.       Discharge Plan:   [facility]  Patient will return 7/3 for next appointment.   Patient discharged in stable condition accompanied by: self.  Departure Mode: Ambulatory.    Mena Alvarez RN

## 2019-07-10 ENCOUNTER — INFUSION THERAPY VISIT (OUTPATIENT)
Dept: INFUSION THERAPY | Facility: CLINIC | Age: 44
End: 2019-07-10
Attending: ALLERGY & IMMUNOLOGY
Payer: COMMERCIAL

## 2019-07-10 VITALS — TEMPERATURE: 97.8 F | SYSTOLIC BLOOD PRESSURE: 111 MMHG | RESPIRATION RATE: 16 BRPM | DIASTOLIC BLOOD PRESSURE: 69 MMHG

## 2019-07-10 DIAGNOSIS — L50.1 URTICARIA, IDIOPATHIC: Primary | ICD-10-CM

## 2019-07-10 PROCEDURE — 96372 THER/PROPH/DIAG INJ SC/IM: CPT

## 2019-07-10 PROCEDURE — 25000128 H RX IP 250 OP 636: Performed by: INTERNAL MEDICINE

## 2019-07-10 RX ADMIN — OMALIZUMAB 300 MG: 150 INJECTION, SOLUTION SUBCUTANEOUS at 15:38

## 2019-07-10 NOTE — PROGRESS NOTES
Infusion Nursing Note:  Holli Thacker presents today for xolair.    Patient seen by provider today: No   present during visit today: Not Applicable.    Note: N/A.    Intravenous Access:  No Intravenous access/labs at this visit.    Treatment Conditions:  Not Applicable.      Post Infusion Assessment:  Patient tolerated injection without incident.  Patient observed for 30 minutes post xolair per protocol.       Discharge Plan:   Patient discharged in stable condition accompanied by: self.  Departure Mode: Ambulatory.    Mena Motne RN

## 2019-07-31 ENCOUNTER — HOSPITAL ENCOUNTER (OUTPATIENT)
Facility: CLINIC | Age: 44
Setting detail: SPECIMEN
End: 2019-07-31
Attending: ALLERGY & IMMUNOLOGY
Payer: COMMERCIAL

## 2019-08-02 ENCOUNTER — HOSPITAL ENCOUNTER (OUTPATIENT)
Facility: CLINIC | Age: 44
Setting detail: SPECIMEN
End: 2019-08-02
Attending: ALLERGY & IMMUNOLOGY

## 2019-08-05 ENCOUNTER — INFUSION THERAPY VISIT (OUTPATIENT)
Dept: INFUSION THERAPY | Facility: CLINIC | Age: 44
End: 2019-08-05
Attending: ALLERGY & IMMUNOLOGY
Payer: COMMERCIAL

## 2019-08-05 VITALS
HEART RATE: 88 BPM | TEMPERATURE: 98.1 F | DIASTOLIC BLOOD PRESSURE: 64 MMHG | OXYGEN SATURATION: 96 % | SYSTOLIC BLOOD PRESSURE: 109 MMHG

## 2019-08-05 DIAGNOSIS — L50.1 URTICARIA, IDIOPATHIC: Primary | ICD-10-CM

## 2019-08-05 PROCEDURE — 96372 THER/PROPH/DIAG INJ SC/IM: CPT

## 2019-08-05 PROCEDURE — 25000128 H RX IP 250 OP 636: Performed by: INTERNAL MEDICINE

## 2019-08-05 RX ADMIN — OMALIZUMAB 300 MG: 150 INJECTION, SOLUTION SUBCUTANEOUS at 10:32

## 2019-08-05 NOTE — PROGRESS NOTES
"Infusion Nursing Note:  Holli Thacker presents today for Xolair.    Patient seen by provider today: No   present during visit today: Not Applicable.    Note: Pt reports her hives are \"few and far between\" since starting on Xolair three years ago.  Did have a small breakout on bottom of right foot and left wrist last night. None now.    Intravenous Access:  No Intravenous access/labs at this visit.    Treatment Conditions:  Not Applicable.      Post Infusion Assessment:  Patient tolerated injection without incident.  Patient observed for 30 minutes post Xolair per protocol.  Site patent and intact, free from redness, edema or discomfort.       Discharge Plan:   Discharge instructions reviewed with: Patient.  Patient and/or family verbalized understanding of discharge instructions and all questions answered.  AVS to patient via [employer]  Patient will return 8/21/19 for next Xolair injection for next appointment.   Patient discharged in stable condition accompanied by: self.  Departure Mode: Ambulatory.    Mercedes Lane RN                        "

## 2019-08-21 ENCOUNTER — INFUSION THERAPY VISIT (OUTPATIENT)
Dept: INFUSION THERAPY | Facility: CLINIC | Age: 44
End: 2019-08-21
Attending: ALLERGY & IMMUNOLOGY
Payer: COMMERCIAL

## 2019-08-21 ENCOUNTER — HOSPITAL ENCOUNTER (OUTPATIENT)
Facility: CLINIC | Age: 44
Setting detail: SPECIMEN
End: 2019-08-21
Attending: ALLERGY & IMMUNOLOGY
Payer: COMMERCIAL

## 2019-08-21 VITALS
DIASTOLIC BLOOD PRESSURE: 59 MMHG | SYSTOLIC BLOOD PRESSURE: 111 MMHG | HEART RATE: 93 BPM | TEMPERATURE: 98.6 F | OXYGEN SATURATION: 96 % | RESPIRATION RATE: 16 BRPM

## 2019-08-21 DIAGNOSIS — L50.1 URTICARIA, IDIOPATHIC: Primary | ICD-10-CM

## 2019-08-21 PROCEDURE — 25000128 H RX IP 250 OP 636: Performed by: INTERNAL MEDICINE

## 2019-08-21 PROCEDURE — 96372 THER/PROPH/DIAG INJ SC/IM: CPT

## 2019-08-21 RX ADMIN — OMALIZUMAB 300 MG: 150 INJECTION, SOLUTION SUBCUTANEOUS at 11:20

## 2019-08-21 NOTE — PROGRESS NOTES
Infusion Nursing Note:  Holli Thacker presents today for Xolair.    Patient seen by provider today: No   present during visit today: Not Applicable.    Note: patient reports breathing worse with humid weather.    Intravenous Access:  No Intravenous access/labs at this visit.    Treatment Conditions:  Not Applicable.      Post Infusion Assessment:  Patient tolerated injection without incident.  Patient observed for 30 minutes post Xolair per protocol.  Site patent and intact, free from redness, edema or discomfort.       Discharge Plan:   Copy of AVS reviewed with patient and/or family.  Patient will return 2 weeks for next appointment.Needs to call scheduling to make appt.  Patient discharged in stable condition accompanied by: self.  Departure Mode: Ambulatory.    Merceeds Breaux, RN, RN

## 2019-10-02 ENCOUNTER — INFUSION THERAPY VISIT (OUTPATIENT)
Dept: INFUSION THERAPY | Facility: CLINIC | Age: 44
End: 2019-10-02
Attending: ALLERGY & IMMUNOLOGY
Payer: COMMERCIAL

## 2019-10-02 VITALS
OXYGEN SATURATION: 96 % | DIASTOLIC BLOOD PRESSURE: 55 MMHG | TEMPERATURE: 99 F | HEART RATE: 87 BPM | SYSTOLIC BLOOD PRESSURE: 95 MMHG | RESPIRATION RATE: 16 BRPM

## 2019-10-02 DIAGNOSIS — L50.1 URTICARIA, IDIOPATHIC: Primary | ICD-10-CM

## 2019-10-02 PROCEDURE — 25000128 H RX IP 250 OP 636: Performed by: INTERNAL MEDICINE

## 2019-10-02 PROCEDURE — 96372 THER/PROPH/DIAG INJ SC/IM: CPT

## 2019-10-02 RX ADMIN — OMALIZUMAB 300 MG: 150 INJECTION, SOLUTION SUBCUTANEOUS at 13:45

## 2019-10-02 NOTE — PROGRESS NOTES
Infusion Nursing Note:  Holli ROOSEVELT Thacker presents today for xolair.    Patient seen by provider today: No   present during visit today: Not Applicable.    Note: N/A.    Intravenous Access:  No Intravenous access/labs at this visit.    Treatment Conditions:  Not Applicable.      Post Infusion Assessment:  Patient tolerated injection without incident.  Patient observed for 30 minutes post xolair per protocol.       Discharge Plan:   Patient will call for next appointment.  Patient discharged in stable condition accompanied by: self.  Departure Mode: Ambulatory.    Mena Monte, RN, RN

## 2019-11-06 ENCOUNTER — INFUSION THERAPY VISIT (OUTPATIENT)
Dept: INFUSION THERAPY | Facility: CLINIC | Age: 44
End: 2019-11-06
Attending: ALLERGY & IMMUNOLOGY
Payer: COMMERCIAL

## 2019-11-06 VITALS
HEART RATE: 93 BPM | OXYGEN SATURATION: 96 % | DIASTOLIC BLOOD PRESSURE: 74 MMHG | TEMPERATURE: 98.2 F | RESPIRATION RATE: 16 BRPM | SYSTOLIC BLOOD PRESSURE: 128 MMHG

## 2019-11-06 DIAGNOSIS — L50.1 URTICARIA, IDIOPATHIC: Primary | ICD-10-CM

## 2019-11-06 PROCEDURE — 96372 THER/PROPH/DIAG INJ SC/IM: CPT

## 2019-11-06 PROCEDURE — 25000128 H RX IP 250 OP 636: Performed by: INTERNAL MEDICINE

## 2019-11-06 RX ADMIN — OMALIZUMAB 300 MG: 150 INJECTION, SOLUTION SUBCUTANEOUS at 10:56

## 2019-11-06 ASSESSMENT — PAIN SCALES - GENERAL: PAINLEVEL: MILD PAIN (2)

## 2019-11-06 NOTE — PROGRESS NOTES
Infusion Nursing Note:  Holli Thacker presents today for Xolair.   Patient seen by provider today: No   present during visit today: Not Applicable.    Note: Feels like Xolair has been effective.  Just started to get hives again over the past wk.  Knows she is due for her injection.    Intravenous Access:  No Intravenous access/labs at this visit.    Treatment Conditions:  Not Applicable.      Post Infusion Assessment:  Patient tolerated injection without incident.  Patient observed for 30 minutes post Xolair per protocol.       Discharge Plan:   Discharge instructions reviewed with: Patient.  Patient discharged in stable condition accompanied by: self.  Departure Mode: Ambulatory.  Next injection is scheduled for 12/4/19.    DARYL SETHI RN

## 2019-12-04 ENCOUNTER — INFUSION THERAPY VISIT (OUTPATIENT)
Dept: INFUSION THERAPY | Facility: CLINIC | Age: 44
End: 2019-12-04
Attending: ALLERGY & IMMUNOLOGY
Payer: COMMERCIAL

## 2019-12-04 VITALS
DIASTOLIC BLOOD PRESSURE: 75 MMHG | TEMPERATURE: 98.6 F | RESPIRATION RATE: 16 BRPM | OXYGEN SATURATION: 96 % | HEART RATE: 95 BPM | SYSTOLIC BLOOD PRESSURE: 134 MMHG

## 2019-12-04 DIAGNOSIS — L50.1 URTICARIA, IDIOPATHIC: Primary | ICD-10-CM

## 2019-12-04 PROCEDURE — 25000128 H RX IP 250 OP 636: Performed by: INTERNAL MEDICINE

## 2019-12-04 PROCEDURE — 96372 THER/PROPH/DIAG INJ SC/IM: CPT

## 2019-12-04 RX ADMIN — OMALIZUMAB 300 MG: 150 INJECTION, SOLUTION SUBCUTANEOUS at 11:32

## 2019-12-04 NOTE — PROGRESS NOTES
Infusion Nursing Note:  Holli ROOSEVELT Linoey Kedar presents today for Xolair.    Patient seen by provider today: No   present during visit today: Not Applicable.    Note: N/A.    Intravenous Access:  No Intravenous access/labs at this visit.    Treatment Conditions:  Not Applicable.      Post Infusion Assessment:  Patient tolerated injection without incident.  Patient observed for 30 minutes post Xolair per protocol.       Discharge Plan:   Discharge instructions reviewed with: Patient.  Patient and/or family verbalized understanding of discharge instructions and all questions answered.  AVS to patient via [employer].  Patient will return 1/2/2019 for next appointment.   Patient discharged in stable condition accompanied by: self.  Departure Mode: Ambulatory.    Cheryl Billings RN

## 2019-12-08 ENCOUNTER — HEALTH MAINTENANCE LETTER (OUTPATIENT)
Age: 44
End: 2019-12-08

## 2019-12-21 ENCOUNTER — NURSE TRIAGE (OUTPATIENT)
Dept: NURSING | Facility: CLINIC | Age: 44
End: 2019-12-21

## 2019-12-21 NOTE — TELEPHONE ENCOUNTER
"[phone]= Pt phone.  [facility]ly [facility]. =Dr Wright = PCP/ Hx of chronic vertigo and takes effexor for vertigo / has colonoscopy scheduled for 12/23/19 by [facility] Dr Melendez .  FNA triage call :   Presenting problem :  Pt called. Just have BM that turn toilet water bright red x 1 @ 1030am today .  Currently : no fever but having intermittent , lower  abdominal pain is 4/10 on pain scale for the last 2 weeks , intermittent  Severe vertigo .   Guideline used : rectal bleeding A AH   Disposition and recommendations : Have someone drive you to ED and Pt unsure what she will do to evaluate rectal bleeding  , severe vertigo , and if she should  still have  colonoscopy scheduled 12/23/19 or not .   Caller verbalizes understanding and denies further questions and will call back if further symptoms to triage or questions  . Nancy Landa RN  - [place] Nurse Advisor     Reason for Disposition    Patient sounds very sick or weak to the triager    Additional Information    Negative: Shock suspected (e.g., cold/pale/clammy skin, too weak to stand, low BP, rapid pulse)    Negative: Difficult to awaken or acting confused (e.g., disoriented, slurred speech)    Negative: Passed out (i.e., lost consciousness, collapsed and was not responding)    Negative: [1] Vomiting AND [2] contains red blood or black (\"coffee ground\") material  (Exception: few red streaks in vomit that only happened once)    Negative: Sounds like a life-threatening emergency to the triager    Negative: Diarrhea is main symptom    Negative: Stool color other than brown or tan is main concern  (no bleeding and no melena)    Negative: SEVERE rectal bleeding (large blood clots; on and off, or constant bleeding)    Negative: SEVERE dizziness (e.g., unable to stand, requires support to walk, feels like passing out now)    Negative: [1] MODERATE rectal bleeding (small blood clots, passing blood without stool, or toilet water turns red) AND [2] more than once a day    " Negative: Pale skin (pallor) of new onset or worsening    Negative: Tarry or jet black-colored stool (not dark green)    Negative: [1] Constant abdominal pain AND [2] present > 2 hours    Negative: Rectal foreign body (i.e., now or within past week;  inserted or swallowed)    Negative: High-risk adult (e.g., prior surgery on aorta, abdominal aortic aneurysm)    Negative: Taking Coumadin (warfarin) or other strong blood thinner, or known bleeding disorder (e.g., thrombocytopenia)    Negative: Known cirrhosis of the liver (or history of liver failure or ascites)    Negative: [1] Colonoscopy AND [2] in past 72 hours    Protocols used: RECTAL BLEEDING-A-AH

## 2019-12-27 ENCOUNTER — INFUSION THERAPY VISIT (OUTPATIENT)
Dept: INFUSION THERAPY | Facility: CLINIC | Age: 44
End: 2019-12-27
Attending: ALLERGY & IMMUNOLOGY
Payer: COMMERCIAL

## 2019-12-27 VITALS
RESPIRATION RATE: 16 BRPM | SYSTOLIC BLOOD PRESSURE: 99 MMHG | TEMPERATURE: 98.1 F | OXYGEN SATURATION: 97 % | DIASTOLIC BLOOD PRESSURE: 61 MMHG | HEART RATE: 78 BPM

## 2019-12-27 DIAGNOSIS — L50.1 URTICARIA, IDIOPATHIC: Primary | ICD-10-CM

## 2019-12-27 PROCEDURE — 96372 THER/PROPH/DIAG INJ SC/IM: CPT

## 2019-12-27 PROCEDURE — 25000128 H RX IP 250 OP 636: Performed by: INTERNAL MEDICINE

## 2019-12-27 RX ADMIN — OMALIZUMAB 300 MG: 150 INJECTION, SOLUTION SUBCUTANEOUS at 13:11

## 2019-12-27 NOTE — PROGRESS NOTES
Infusion Nursing Note:  Holli Thacker presents today for Xolair.    Patient seen by provider today: No   present during visit today: Not Applicable.    Note: Patient has a flare of redness and swelling of both hands, has been on prednisone which helps the itching but not the redness and swelling.  She is certain the xolair today with help improve symptoms.    Intravenous Access:  No Intravenous access/labs at this visit.    Treatment Conditions:  Not Applicable.      Post Infusion Assessment:  Patient tolerated injection without incident.  Patient observed for 30 minutes post xolair per protocol.       Discharge Plan:   [facility]  Patient will return 1/17/20 for next appointment.   Patient discharged in stable condition accompanied by: self.  Departure Mode: Ambulatory.    Mena Monte RN

## 2020-01-06 ENCOUNTER — OFFICE VISIT (OUTPATIENT)
Dept: URGENT CARE | Facility: URGENT CARE | Age: 45
End: 2020-01-06
Payer: COMMERCIAL

## 2020-01-06 VITALS — DIASTOLIC BLOOD PRESSURE: 78 MMHG | HEART RATE: 79 BPM | OXYGEN SATURATION: 97 % | SYSTOLIC BLOOD PRESSURE: 131 MMHG

## 2020-01-06 DIAGNOSIS — Z92.25 PERSONAL HISTORY OF IMMUNOSUPPRESSIVE THERAPY: ICD-10-CM

## 2020-01-06 DIAGNOSIS — J45.51 SEVERE PERSISTENT ASTHMA WITH (ACUTE) EXACERBATION (H): Primary | ICD-10-CM

## 2020-01-06 PROCEDURE — 99204 OFFICE O/P NEW MOD 45 MIN: CPT | Performed by: FAMILY MEDICINE

## 2020-01-06 RX ORDER — AZELASTINE HYDROCHLORIDE 137 UG/1
SPRAY, METERED NASAL
COMMUNITY
Start: 2019-11-18 | End: 2021-03-06

## 2020-01-06 RX ORDER — PREGABALIN 25 MG/1
25 CAPSULE ORAL
COMMUNITY
Start: 2019-08-13

## 2020-01-06 RX ORDER — DOXYCYCLINE 100 MG/1
100 CAPSULE ORAL 2 TIMES DAILY
Qty: 20 CAPSULE | Refills: 0 | Status: SHIPPED | OUTPATIENT
Start: 2020-01-06 | End: 2020-01-16

## 2020-01-06 RX ORDER — PREDNISONE 20 MG/1
40 TABLET ORAL DAILY
Qty: 10 TABLET | Refills: 0 | Status: SHIPPED | OUTPATIENT
Start: 2020-01-06 | End: 2020-01-11

## 2020-01-06 NOTE — PATIENT INSTRUCTIONS
"  Patient Education     Asthma (Adult)  Asthma is a disease where the medium and  small air passages within the lung go into spasm and restrict the flow of air. Inflammation and swelling of the airways cause further blockage. During an acute asthma attack, these factors cause trouble breathing, wheezing, cough and chest tightness.    An asthma attack can be triggered by many things. Common triggers include infections such as the common cold, bronchitis, and pneumonia. Irritants such as smoke or pollutants in the air, very cold air, emotional upset, and exercise can also trigger an attack. In many adults with asthma, allergies to dust, mold, pollen and animal dander can cause an asthma attack. Skipping doses of daily asthma medicine can also bring on an asthma attack.  Asthma can be controlled using the proper medicines prescribed by your healthcare provider and avoiding exposure to known triggers including allergens and irritants.  Home care    Take prescribed medicine exactly at the times advised. If you need medicine such as from a hand held inhaler or aerosol breathing machine more than every 4 hours, contact your healthcare provider or seek immediate medical attention. If prescribed an antibiotic or prednisone, take all of the medicine as prescribed, even if you are feeling better after a few days.    Don't smoke. Avoid being exposed to the smoke of others.    Some people with asthma have worsening of their symptoms when they take aspirin and non-steroidal or fever-reducing medicines like ibuprofen and naproxen. Talk to your healthcare provider if you think this may apply to you.  Follow-up care  Follow up with your healthcare provider, or as advised. Always bring all of your current medicines to any appointments with your healthcare provider. Also bring a complete list of medicines even those not taken for asthma. If you don't already have one, talk to your healthcare provider about developing your own \"Asthma " "Action Plan.\"  A pneumococcal (pneumonia) vaccine and yearly flu shot (every fall) are recommended. Ask your doctor about this.  When to seek medical advice  Call your healthcare provider right away if any of these occur:     Increased wheezing or shortness of breath    Need to use your inhalers more often than usual without relief    Fever of 100.4 F (38 C) or higher, or as directed by your healthcare provider    Coughing up lots of dark-colored or bloody sputum (mucus)    Chest pain with each breath    If you use a peak flow meter as part of an Asthma Action Plan, and you are still in the yellow zone (50% to 80%) 15 minutes after using inhaler medicine.  Call 911  Call 911 if any of the following occur    Trouble walking or talking because of shortness of breath    If you use a peak flow meter as part of an Asthma Action Plan and you are still in the red zone (less than 50%) 15 minutes after using inhaler medicine    Lips or fingernails turning gray or blue  Date Last Reviewed: 5/1/2017 2000-2019 [employer]. [address] [place] [address], [place] [address]. All rights reserved. This information is not intended as a substitute for professional medical care. Always follow your healthcare professional's instructions.           "

## 2020-01-07 NOTE — PROGRESS NOTES
"Chief Complaint   Patient presents with     Urgent Care     Asthma     \"asthma flare-up\"-SOB, wheezing,-tried Nebs/inhaler-started yesterday, Chest pain, back feels \"tight\"     Holli Thacker is a 44 year old female who presents for shortness of breath with wheezing that started 1 days ago.     A previous diagnosis of asthma was made concerning this patient on the basis of   symptoms, pulmonary function testing and response to medications.   Current symptoms include wheezing, non-productive cough, dyspnea on exertion and chest tightness.   Precipitating factors are uri infections and pollens.      Asthma treatment that is used daily/ chronically throughout the year ( singulair, advair 500-50, Xolair  daily)  Additional treatments to control this asthma episode  ( home duonebs,  Albuterol nebs/ inhaler )    Hx rheumatoid arthritis-  Immunosuppression with  Xeljanz       Past Medical History:   Diagnosis Date     Anxiety      Arnold-Chiari malformation, type I (H)      Depressive disorder      Fibromyalgia      Migraine      PONV (postoperative nausea and vomiting)      Rheumatoid arthritis flare (H)      Uncomplicated asthma      Patient Active Problem List   Diagnosis     Chiari malformation     Urticaria, idiopathic     Post-op pain     Spasm of muscle     Chronic migraine without aura, with intractable migraine, so stated, with status migrainosus     Classical migraine       ALLERGIES:  Azithromycin; Erythromycin; and Verapamil    MEDs  Azelastine HCl 137 MCG/SPRAY SOLN,   Cholecalciferol (VITAMIN D3) 3000 UNITS TABS, Take 5,000 Units by mouth daily  cyclobenzaprine (FLEXERIL) 10 MG tablet, Take 10 mg by mouth At Bedtime   fluticasone-salmeterol (ADVAIR) 500-50 MCG/DOSE diskus inhaler, Inhale 1 puff into the lungs 2 times daily  ibuprofen (ADVIL/MOTRIN) 200 MG tablet, Take 1-2 tablets (200-400 mg) by mouth every 6 hours as needed for other (mild pain)  ipratropium - albuterol 0.5 mg/2.5 mg/3 mL (DUONEB) " 0.5-2.5 (3) MG/3ML nebulization, Take 1 vial (3 mLs) by nebulization 3 times daily  Montelukast Sodium (SINGULAIR PO), Take 10 mg by mouth At Bedtime   omalizumab (XOLAIR) 150 MG injection, Inject 300 mg Subcutaneous See Admin Instructions   pantoprazole (PROTONIX) 40 MG EC tablet, Take 40 mg by mouth every morning   predniSONE (DELTASONE) 20 MG tablet, Take 10 mg by mouth daily   pregabalin (LYRICA) 25 MG capsule, Take 25 mg by mouth  ranitidine (ZANTAC) 150 MG tablet, Take 150 mg by mouth  Sertraline HCl (ZOLOFT PO), Take 200 mg by mouth At Bedtime   tofacitinib (XELJANZ) 5 MG tablet, Take 5 mg by mouth 2 times daily  Venlafaxine HCl (EFFEXOR PO), Take 37.5 mg by mouth 2 times daily  ZOLMitriptan (ZOMIG PO), Take 5 mg by mouth at onset of headache for migraine  Zonisamide (ZONEGRAN PO), Take 100 mg by mouth At Bedtime   albuterol (2.5 MG/3ML) 0.083% nebulizer solution, Take 1 vial (2.5 mg) by nebulization every 4 hours as needed for shortness of breath / dyspnea or wheezing    No current facility-administered medications on file prior to visit.       Social History     Tobacco Use     Smoking status: Current Every Day Smoker     Smokeless tobacco: Never Used   Substance Use Topics     Alcohol use: No     Family History:  Non-contributory,  No associated family health conditions    Review Of Systems    Constitutional:  Negative for fevers, chills , some fatigue  Skin: negative for rash or lesions  Eyes: negative for eye pain, visual changes  Ears/Nose/Throat: negative for earache  , sinus trouble, persistent sore throat  Respiratory: No shortness of breath, dyspnea on exertion  or hemoptysis  Cardiovascular: negative for, palpitations, tachycardia  -  Has some chest pain with respiration  Gastrointestinal: negative for vomiting, abdominal pain or diarrhea  Genitourinary: negative for dysuria or hematuria  Back: negative for pain with back movement,  CVA pain  Musculoskeletal:  Hx rheumatoid arthritis  Neurologic:  negative for generalized or local weakness or incoordination  Endocrine: negative for thyroid disorder or diabetes      OBJECTIVE: Initial physical exam  /78 (BP Location: Right arm, Patient Position: Sitting, Cuff Size: Adult Regular)   Pulse 79   SpO2 97%   Breastfeeding No    GENERAL: alert, moderate distress, cooperative,  Occasional cough  SKIN: skin is clear, no rashes noted  HEAD: The head is normocephalic.   EYES: conjunctivae and cornea normal.without erythema or discharge  EARS: The canals are clear, tympanic membranes normal with no erythema/effusion.  NOSE: Clear, no discharge or congestion: THROAT: moist mucous membranes, no erythema.  NECK: The neck is supple, no masses or significant adenopathy noted  LUNGS: clear to auscultation, no rales, rhonchi, wheezing or retractions  CV: regular rate and rhythm. S1 and S2 are normal. No murmurs.  ABDOMEN:  Abdomen soft, non-tender, non-distended, no masses. bowel sound normal       ASSESSMENT:  Severe persistent asthma with (acute) exacerbation     - doxycycline hyclate (VIBRAMYCIN) 100 MG capsule; Take 1 capsule (100 mg) by mouth 2 times daily for 10 days  - predniSONE (DELTASONE) 20 MG tablet; Take 2 tablets (40 mg) by mouth daily for 5 days       Medication: continue current asthma medication regimen unchanged-  Singulair/ xolair/ advair.   QID duonebs -  Prn albuterol        Discussed medication dosage, usage, side effects, and goals of   treatment in detail.     Avoidance of precipitants.    Return if worsening shortness of breath.    Follow-up with primary physician for chronic management of asthma symptoms    Patient Education: Instructed to return to urgent care or notify primary MD office of fever >101, blood in sputum, chest pain, dyspnea at rest, or other symptoms of concern to patient.       Personal history of immunosuppressive therapy    increased risk of more severe bacterial and viral infections requiring prompt antibiotic  treatment.

## 2020-01-17 ENCOUNTER — INFUSION THERAPY VISIT (OUTPATIENT)
Dept: INFUSION THERAPY | Facility: CLINIC | Age: 45
End: 2020-01-17
Attending: ALLERGY & IMMUNOLOGY
Payer: COMMERCIAL

## 2020-01-17 VITALS
RESPIRATION RATE: 16 BRPM | TEMPERATURE: 97.1 F | OXYGEN SATURATION: 95 % | DIASTOLIC BLOOD PRESSURE: 72 MMHG | HEART RATE: 86 BPM | SYSTOLIC BLOOD PRESSURE: 119 MMHG

## 2020-01-17 DIAGNOSIS — L50.1 URTICARIA, IDIOPATHIC: Primary | ICD-10-CM

## 2020-01-17 PROCEDURE — 96372 THER/PROPH/DIAG INJ SC/IM: CPT

## 2020-01-17 PROCEDURE — 25000128 H RX IP 250 OP 636: Performed by: INTERNAL MEDICINE

## 2020-01-17 RX ADMIN — OMALIZUMAB 300 MG: 150 INJECTION, SOLUTION SUBCUTANEOUS at 13:06

## 2020-01-17 NOTE — PROGRESS NOTES
Infusion Nursing Note:  Holli Thacker presents today for xolair.     present during visit today: Not Applicable.    Note: N/A.    Intravenous Access:  No Intravenous access/labs at this visit.    Treatment Conditions:  NA    Post Lab Assessment  Patient tolerated injection   Observed 30 minutes post injection  Site patent and intact, free from redness, edema or discomfort.      Discharge Plan:   Patient and/or family verbalized understanding of  instructions and all questions answered.  Patient  to lobby in stable condition accompanied by: self.  Patient to see provider today: No  Departure Mode: Ambulatory.  Valentina Irene, RN, RN

## 2020-01-20 ENCOUNTER — ANCILLARY PROCEDURE (OUTPATIENT)
Dept: GENERAL RADIOLOGY | Facility: CLINIC | Age: 45
End: 2020-01-20
Attending: FAMILY MEDICINE
Payer: COMMERCIAL

## 2020-01-20 ENCOUNTER — OFFICE VISIT (OUTPATIENT)
Dept: URGENT CARE | Facility: URGENT CARE | Age: 45
End: 2020-01-20
Payer: COMMERCIAL

## 2020-01-20 VITALS
SYSTOLIC BLOOD PRESSURE: 117 MMHG | TEMPERATURE: 99.2 F | OXYGEN SATURATION: 99 % | DIASTOLIC BLOOD PRESSURE: 64 MMHG | HEART RATE: 82 BPM

## 2020-01-20 DIAGNOSIS — R05.9 COUGH: ICD-10-CM

## 2020-01-20 DIAGNOSIS — R06.02 SOB (SHORTNESS OF BREATH): ICD-10-CM

## 2020-01-20 DIAGNOSIS — R07.89 CHEST TIGHTNESS: ICD-10-CM

## 2020-01-20 DIAGNOSIS — J45.901 MODERATE ASTHMA WITH EXACERBATION, UNSPECIFIED WHETHER PERSISTENT: Primary | ICD-10-CM

## 2020-01-20 DIAGNOSIS — R50.9 FEVER IN ADULT: ICD-10-CM

## 2020-01-20 PROCEDURE — 99214 OFFICE O/P EST MOD 30 MIN: CPT | Mod: 25 | Performed by: FAMILY MEDICINE

## 2020-01-20 PROCEDURE — 94640 AIRWAY INHALATION TREATMENT: CPT | Performed by: FAMILY MEDICINE

## 2020-01-20 PROCEDURE — 71046 X-RAY EXAM CHEST 2 VIEWS: CPT

## 2020-01-20 RX ORDER — PREDNISONE 10 MG/1
40 TABLET ORAL ONCE
Status: COMPLETED | OUTPATIENT
Start: 2020-01-20 | End: 2020-01-20

## 2020-01-20 RX ORDER — PREDNISONE 20 MG/1
20 TABLET ORAL 2 TIMES DAILY
Qty: 8 TABLET | Refills: 0 | Status: SHIPPED | OUTPATIENT
Start: 2020-01-21 | End: 2020-01-25

## 2020-01-20 RX ORDER — IPRATROPIUM BROMIDE AND ALBUTEROL SULFATE 2.5; .5 MG/3ML; MG/3ML
3 SOLUTION RESPIRATORY (INHALATION) ONCE
Status: COMPLETED | OUTPATIENT
Start: 2020-01-20 | End: 2020-01-20

## 2020-01-20 RX ADMIN — PREDNISONE 40 MG: 10 TABLET ORAL at 14:56

## 2020-01-20 RX ADMIN — IPRATROPIUM BROMIDE AND ALBUTEROL SULFATE 3 ML: 2.5; .5 SOLUTION RESPIRATORY (INHALATION) at 14:28

## 2020-01-20 NOTE — PROGRESS NOTES
SUBJECTIVE:  Holli Thacker is a 44 year old female history of asthma who presents to the clinic today with a chief complaint of chest congestion chest tightness and wheezing symptoms with some cough.  And shortness of breath has been noticing it for last 7 days.  Symptoms are worsening.  She has been doing her inhalers on a regular basis.  Denies any high fever chills shortness of breath. and wheezing. for 1 week(s).  Her cough is described as occasional.    The patient's symptoms are moderate and worsening.  Associated symptoms include congestion and shortness of breath. The patient's symptoms are exacerbated by no particular triggers  Patient has been using inhaler  to improve symptoms.    Past Medical History:   Diagnosis Date     Anxiety      Arnold-Chiari malformation, type I (H)      Depressive disorder      Fibromyalgia      Migraine      PONV (postoperative nausea and vomiting)      Rheumatoid arthritis flare (H)      Uncomplicated asthma        Current Outpatient Medications   Medication Sig Dispense Refill     Azelastine HCl 137 MCG/SPRAY SOLN        Cholecalciferol (VITAMIN D3) 3000 UNITS TABS Take 5,000 Units by mouth daily       cyclobenzaprine (FLEXERIL) 10 MG tablet Take 10 mg by mouth At Bedtime        fluticasone-salmeterol (ADVAIR) 500-50 MCG/DOSE diskus inhaler Inhale 1 puff into the lungs 2 times daily       ibuprofen (ADVIL/MOTRIN) 200 MG tablet Take 1-2 tablets (200-400 mg) by mouth every 6 hours as needed for other (mild pain) 30 tablet 0     ipratropium - albuterol 0.5 mg/2.5 mg/3 mL (DUONEB) 0.5-2.5 (3) MG/3ML nebulization Take 1 vial (3 mLs) by nebulization 3 times daily 1 Box 3     Montelukast Sodium (SINGULAIR PO) Take 10 mg by mouth At Bedtime        omalizumab (XOLAIR) 150 MG injection Inject 300 mg Subcutaneous See Admin Instructions        pantoprazole (PROTONIX) 40 MG EC tablet Take 40 mg by mouth every morning        predniSONE (DELTASONE) 20 MG tablet Take 10 mg by mouth  daily        pregabalin (LYRICA) 25 MG capsule Take 25 mg by mouth       ranitidine (ZANTAC) 150 MG tablet Take 150 mg by mouth       Sertraline HCl (ZOLOFT PO) Take 200 mg by mouth At Bedtime        tofacitinib (XELJANZ) 5 MG tablet Take 5 mg by mouth 2 times daily       Venlafaxine HCl (EFFEXOR PO) Take 37.5 mg by mouth 2 times daily       ZOLMitriptan (ZOMIG PO) Take 5 mg by mouth at onset of headache for migraine       Zonisamide (ZONEGRAN PO) Take 100 mg by mouth At Bedtime        albuterol (2.5 MG/3ML) 0.083% nebulizer solution Take 1 vial (2.5 mg) by nebulization every 4 hours as needed for shortness of breath / dyspnea or wheezing 1 Box 0       Social History     Tobacco Use     Smoking status: Current Every Day Smoker     Smokeless tobacco: Never Used   Substance Use Topics     Alcohol use: No       ROS  10 point ROS of systems including Constitutional, Eyes,  Cardiovascular, Gastroenterology, Genitourinary, Integumentary, Muscularskeletal, Psychiatric were all negative except for pertinent positives noted in my HPI           OBJECTIVE:  /64 (BP Location: Right arm, Patient Position: Sitting, Cuff Size: Adult Large)   Pulse 82   Temp 99.2  F (37.3  C) (Tympanic)   SpO2 97%   Breastfeeding No   GENERAL APPEARANCE: healthy, alert and no distress  EYES: EOMI,  PERRL, conjunctiva clear  HENT: ear canals and TM's normal.  Nose and mouth without ulcers, erythema or lesions  NECK: supple, nontender, no lymphadenopathy  RESP: lungs clear to auscultation - no rales, rhonchi or wheezes  CV: regular rates and rhythm, normal S1 S2, no murmur noted  ABDOMEN:  soft, nontender, no HSM or masses and bowel sounds normal  SKIN: no suspicious lesions or rashes  PSYCH: mentation appears normal    ASSESSMENT:   Holli was seen today for urgent care and uri.    Diagnoses and all orders for this visit:    Cough    Fever in adult    SOB (shortness of breath)  -     ipratropium - albuterol 0.5 mg/2.5 mg/3 mL (DUONEB) neb  solution 3 mL  -     predniSONE (DELTASONE) tablet 40 mg  -     XR Chest 2 Views  -     INHALATION/NEBULIZER TREATMENT, INITIAL    Chest tightness          PLAN:  See orders in Epic  Symptomatic measures encouraged, humidified air, plenty of fluids.  Chest x-ray there was no infiltrate or pneumothorax or pleural effusion noted I did discuss with patient of the clinical findings and also the chest x-ray findings.  Patient will be treated with prednisone for 4 more days starting tomorrow.  Continue on inhaler as directed.  If symptoms do not get better over the next 2 to 3 days should follow-up for further evaluation and treatment.  Was advised to continue doing neb treatment every 4-6 hours with chest tightness symptoms.  For neb treatment in the clinic patient did feel way better and lungs sounded much clearer  Follow up if  symptoms fail to improve or worsens   Pt understood and agreed with plan     Mariola Mujica MD

## 2020-01-20 NOTE — NURSING NOTE
The following medication was given:     Pt verified using name and .     MEDICATION: prednisone  ROUTE: PO  SITE: mouth  DOSE: 40 mg Total (2 20mg Tablets Given)  LOT #: [id number]  :  Not Listed  EXPIRATION DATE:    NDC#: 29332-884-36    The following nebulizer treatment was given:     MEDICATION: Duoneb  : AccuRev  LOT #: [id number]  EXPIRATION DATE:    NDC # 5253-5167-65     Nebulizer Start Time:  1428  Nebulizer Stop Time:  1436  See Vital Signs Flowsheet    Sarbjit Giles CMA ([facility])

## 2020-02-05 ENCOUNTER — OFFICE VISIT (OUTPATIENT)
Dept: URGENT CARE | Facility: URGENT CARE | Age: 45
End: 2020-02-05
Payer: COMMERCIAL

## 2020-02-05 VITALS
HEART RATE: 72 BPM | DIASTOLIC BLOOD PRESSURE: 78 MMHG | RESPIRATION RATE: 14 BRPM | BODY MASS INDEX: 32.51 KG/M2 | TEMPERATURE: 99.6 F | WEIGHT: 201.4 LBS | SYSTOLIC BLOOD PRESSURE: 130 MMHG | OXYGEN SATURATION: 98 %

## 2020-02-05 DIAGNOSIS — G43.909 MIGRAINE WITHOUT STATUS MIGRAINOSUS, NOT INTRACTABLE, UNSPECIFIED MIGRAINE TYPE: ICD-10-CM

## 2020-02-05 DIAGNOSIS — Z20.828 EXPOSURE TO INFLUENZA: Primary | ICD-10-CM

## 2020-02-05 DIAGNOSIS — R58 ECCHYMOSIS: ICD-10-CM

## 2020-02-05 LAB
ERYTHROCYTE [DISTWIDTH] IN BLOOD BY AUTOMATED COUNT: 16.2 % (ref 10–15)
FLUAV+FLUBV AG SPEC QL: NEGATIVE
FLUAV+FLUBV AG SPEC QL: NEGATIVE
HCT VFR BLD AUTO: 39.7 % (ref 35–47)
HGB BLD-MCNC: 12.5 G/DL (ref 11.7–15.7)
MCH RBC QN AUTO: 28.8 PG (ref 26.5–33)
MCHC RBC AUTO-ENTMCNC: 31.5 G/DL (ref 31.5–36.5)
MCV RBC AUTO: 92 FL (ref 78–100)
PLATELET # BLD AUTO: 348 10E9/L (ref 150–450)
RBC # BLD AUTO: 4.34 10E12/L (ref 3.8–5.2)
SPECIMEN SOURCE: NORMAL
WBC # BLD AUTO: 10.1 10E9/L (ref 4–11)

## 2020-02-05 PROCEDURE — 36415 COLL VENOUS BLD VENIPUNCTURE: CPT | Performed by: PHYSICIAN ASSISTANT

## 2020-02-05 PROCEDURE — 99214 OFFICE O/P EST MOD 30 MIN: CPT | Mod: 25 | Performed by: PHYSICIAN ASSISTANT

## 2020-02-05 PROCEDURE — 87804 INFLUENZA ASSAY W/OPTIC: CPT | Performed by: PHYSICIAN ASSISTANT

## 2020-02-05 PROCEDURE — 85027 COMPLETE CBC AUTOMATED: CPT | Performed by: PHYSICIAN ASSISTANT

## 2020-02-05 PROCEDURE — 96372 THER/PROPH/DIAG INJ SC/IM: CPT | Performed by: PHYSICIAN ASSISTANT

## 2020-02-05 RX ORDER — KETOROLAC TROMETHAMINE 30 MG/ML
60 INJECTION, SOLUTION INTRAMUSCULAR; INTRAVENOUS ONCE
Status: COMPLETED | OUTPATIENT
Start: 2020-02-05 | End: 2020-02-05

## 2020-02-05 RX ADMIN — KETOROLAC TROMETHAMINE 60 MG: 30 INJECTION, SOLUTION INTRAMUSCULAR; INTRAVENOUS at 16:53

## 2020-02-05 ASSESSMENT — ENCOUNTER SYMPTOMS
CHILLS: 1
DIARRHEA: 0
COUGH: 0
SORE THROAT: 0
HEADACHES: 1
VOMITING: 0
FEVER: 0
NAUSEA: 0

## 2020-02-05 NOTE — PROGRESS NOTES
SUBJECTIVE:   Holli Thacker is a 44 year old female presenting with a chief complaint of   Chief Complaint   Patient presents with     Urgent Care     headache and possible sinus infect, exposed to influenza B. Can't keep her eyes open       She is an established patient of [place].    URI Adult    Onset of symptoms was 1 day(s) ago.  Course of illness is worsening.    Severity moderate  Current and Associated symptoms: HA, chills. No recorded fever. HA is persistent. Has a history of migraine HA. Took her zomig without any improvement. Reports photosensitivity.   Denies sore throat, cough. No n/v.  Treatment measures tried include Tylenol/Ibuprofen.  Predisposing factors include exposure to influenza.  Has noted bruises on arms. No trauma or injury.      Review of Systems   Constitutional: Positive for chills. Negative for fever.   HENT: Negative for sore throat.    Respiratory: Negative for cough.    Gastrointestinal: Negative for diarrhea, nausea and vomiting.   Neurological: Positive for headaches.       Past Medical History:   Diagnosis Date     Anxiety      Arnold-Chiari malformation, type I (H)      Depressive disorder      Fibromyalgia      Migraine      PONV (postoperative nausea and vomiting)      Rheumatoid arthritis flare (H)      Uncomplicated asthma      History reviewed. No pertinent family history.  Current Outpatient Medications   Medication Sig Dispense Refill     Azelastine HCl 137 MCG/SPRAY SOLN        Cholecalciferol (VITAMIN D3) 3000 UNITS TABS Take 5,000 Units by mouth daily       cyclobenzaprine (FLEXERIL) 10 MG tablet Take 10 mg by mouth At Bedtime        fluticasone-salmeterol (ADVAIR) 500-50 MCG/DOSE diskus inhaler Inhale 1 puff into the lungs 2 times daily       ibuprofen (ADVIL/MOTRIN) 200 MG tablet Take 1-2 tablets (200-400 mg) by mouth every 6 hours as needed for other (mild pain) 30 tablet 0     ipratropium - albuterol 0.5 mg/2.5 mg/3 mL (DUONEB) 0.5-2.5 (3) MG/3ML  nebulization Take 1 vial (3 mLs) by nebulization 3 times daily 1 Box 3     Montelukast Sodium (SINGULAIR PO) Take 10 mg by mouth At Bedtime        omalizumab (XOLAIR) 150 MG injection Inject 300 mg Subcutaneous See Admin Instructions        pantoprazole (PROTONIX) 40 MG EC tablet Take 40 mg by mouth every morning        predniSONE (DELTASONE) 20 MG tablet Take 10 mg by mouth daily        pregabalin (LYRICA) 25 MG capsule Take 25 mg by mouth       ranitidine (ZANTAC) 150 MG tablet Take 150 mg by mouth       Sertraline HCl (ZOLOFT PO) Take 200 mg by mouth At Bedtime        tofacitinib (XELJANZ) 5 MG tablet Take 5 mg by mouth 2 times daily       Venlafaxine HCl (EFFEXOR PO) Take 37.5 mg by mouth 2 times daily       ZOLMitriptan (ZOMIG PO) Take 5 mg by mouth at onset of headache for migraine       Zonisamide (ZONEGRAN PO) Take 100 mg by mouth At Bedtime        albuterol (2.5 MG/3ML) 0.083% nebulizer solution Take 1 vial (2.5 mg) by nebulization every 4 hours as needed for shortness of breath / dyspnea or wheezing 1 Box 0     Social History     Tobacco Use     Smoking status: Current Every Day Smoker     Smokeless tobacco: Never Used   Substance Use Topics     Alcohol use: No       OBJECTIVE  /78 (BP Location: Right arm, Patient Position: Chair, Cuff Size: Adult Regular)   Pulse 72   Temp 99.6  F (37.6  C) (Oral)   Resp 14   Wt 91.4 kg (201 lb 6.4 oz)   SpO2 98%   BMI 32.51 kg/m      Physical Exam  Constitutional:       General: She is not in acute distress.     Appearance: She is well-developed.   HENT:      Head: Normocephalic and atraumatic.      Right Ear: Tympanic membrane and external ear normal.      Left Ear: Tympanic membrane and external ear normal.      Mouth/Throat:      Mouth: Mucous membranes are moist.      Pharynx: Oropharynx is clear.   Eyes:      Conjunctiva/sclera: Conjunctivae normal.   Neck:      Musculoskeletal: Normal range of motion.   Cardiovascular:      Rate and Rhythm: Regular  rhythm.      Heart sounds: Normal heart sounds.   Pulmonary:      Effort: Pulmonary effort is normal. No respiratory distress.      Breath sounds: Normal breath sounds. No wheezing, rhonchi or rales.   Skin:     General: Skin is warm and dry.      Findings: Bruising (noted on arms) present.   Neurological:      General: No focal deficit present.      Mental Status: She is alert and oriented to person, place, and time.         Labs:  Results for orders placed or performed in visit on 02/05/20 (from the past 24 hour(s))   Influenza A/B antigen   Result Value Ref Range    Influenza A/B Agn Specimen Nasal     Influenza A Negative NEG^Negative    Influenza B Negative NEG^Negative   CBC with platelets   Result Value Ref Range    WBC 10.1 4.0 - 11.0 10e9/L    RBC Count 4.34 3.8 - 5.2 10e12/L    Hemoglobin 12.5 11.7 - 15.7 g/dL    Hematocrit 39.7 35.0 - 47.0 %    MCV 92 78 - 100 fl    MCH 28.8 26.5 - 33.0 pg    MCHC 31.5 31.5 - 36.5 g/dL    RDW 16.2 (H) 10.0 - 15.0 %    Platelet Count 348 150 - 450 10e9/L           ASSESSMENT:      ICD-10-CM    1. Exposure to influenza Z20.828 Influenza A/B antigen   2. Migraine without status migrainosus, not intractable, unspecified migraine type G43.909 ketorolac (TORADOL) injection 60 mg   3. Ecchymosis R58 CBC with platelets          PLAN:    Exposure to influenza: influenza test is negative today. Advised supportive cares. Tylenol or motrin as needed. Push fluids. Rest. Follow up if any worsening symptoms. Patient agrees.  Migraine HA: I believe patient has an influenza like illness that has triggered her migraine HA. She is given in urgent care 60 mg Toradol IM. Noted some improvement. Advised rest. Fluids. Follow up if any worsening symptoms. She agrees.   Ecchymosis:  Patient does not recall injury or trauma. Hgb and platelets count within normal limits. Keep monitoring. Follow up if any worsening symptoms. She agrees.     Followup:    If not improving or if condition worsens,  follow up with your Primary Care Provider

## 2020-02-05 NOTE — PROGRESS NOTES
Clinic Administered Medication Documentation    MEDICATION LIST:   Injectable Medication Documentation    Patient was given Ketorolac Tromethamine (Toradol). Prior to medication administration, verified patients identity using patient s name and date of birth. Please see MAR and medication order for additional information. Patient instructed to remain in clinic for 15 minutes.      Was entire vial of medication used? Yes  Vial/Syringe: Single dose vial  Expiration Date:  5/21  Was this medication supplied by the patient? No     Gio Fuentes CMA

## 2020-02-07 ENCOUNTER — INFUSION THERAPY VISIT (OUTPATIENT)
Dept: INFUSION THERAPY | Facility: CLINIC | Age: 45
End: 2020-02-07
Attending: ALLERGY & IMMUNOLOGY
Payer: COMMERCIAL

## 2020-02-07 VITALS
TEMPERATURE: 98.9 F | SYSTOLIC BLOOD PRESSURE: 122 MMHG | HEART RATE: 90 BPM | RESPIRATION RATE: 16 BRPM | DIASTOLIC BLOOD PRESSURE: 66 MMHG | OXYGEN SATURATION: 95 %

## 2020-02-07 DIAGNOSIS — L50.1 URTICARIA, IDIOPATHIC: Primary | ICD-10-CM

## 2020-02-07 PROCEDURE — 25000128 H RX IP 250 OP 636: Performed by: INTERNAL MEDICINE

## 2020-02-07 PROCEDURE — 96372 THER/PROPH/DIAG INJ SC/IM: CPT

## 2020-02-07 RX ADMIN — OMALIZUMAB 300 MG: 150 INJECTION, SOLUTION SUBCUTANEOUS at 13:17

## 2020-02-07 NOTE — PROGRESS NOTES
Infusion Nursing Note:  Holli Thacker presents today for Xolair.    Patient seen by provider today: No   present during visit today: Not Applicable.    Note: N/A.    Intravenous Access:  No Intravenous access/labs at this visit.    Treatment Conditions:  Not Applicable.      Post Infusion Assessment:  Patient tolerated injection without incident.  Patient observed for 30 minutes post Xolair per protocol.       Discharge Plan:   Discharge instructions reviewed with: Patient.  Patient and/or family verbalized understanding of discharge instructions and all questions answered.  Copy of AVS reviewed with patient and/or family.  Patient will return 2/28/2020 for next appointment.  Patient discharged in stable condition accompanied by: self.  Departure Mode: Ambulatory.    Valerie Husain RN

## 2020-03-02 ENCOUNTER — INFUSION THERAPY VISIT (OUTPATIENT)
Dept: INFUSION THERAPY | Facility: CLINIC | Age: 45
End: 2020-03-02
Attending: INTERNAL MEDICINE
Payer: COMMERCIAL

## 2020-03-02 VITALS
OXYGEN SATURATION: 96 % | DIASTOLIC BLOOD PRESSURE: 75 MMHG | SYSTOLIC BLOOD PRESSURE: 119 MMHG | HEART RATE: 69 BPM | RESPIRATION RATE: 16 BRPM | TEMPERATURE: 98 F

## 2020-03-02 DIAGNOSIS — L50.1 URTICARIA, IDIOPATHIC: Primary | ICD-10-CM

## 2020-03-02 PROCEDURE — 96372 THER/PROPH/DIAG INJ SC/IM: CPT

## 2020-03-02 PROCEDURE — 25000128 H RX IP 250 OP 636: Performed by: INTERNAL MEDICINE

## 2020-03-02 RX ADMIN — OMALIZUMAB 300 MG: 150 INJECTION, SOLUTION SUBCUTANEOUS at 13:12

## 2020-03-02 NOTE — PROGRESS NOTES
Infusion Nursing Note:  Holli ROOSEVELT Thacker presents today for Xolair SQ.    Patient seen by provider today: No   present during visit today: Not Applicable.    Note: 1 injection given in right arm and 1 given in left arm per patient request.    Intravenous Access:  No Intravenous access/labs at this visit.    Treatment Conditions:  Not Applicable.      Post Infusion Assessment:  Patient tolerated injection without incident.  Patient observed for 30 minutes post Xolair per protocol.       Discharge Plan:   Discharge instructions reviewed with: Patient.  Patient and/or family verbalized understanding of discharge instructions and all questions answered.  Patient discharged in stable condition accompanied by: self.  Departure Mode: Ambulatory.    Holly Mendoza RN

## 2020-03-11 ENCOUNTER — HOSPITAL ENCOUNTER (EMERGENCY)
Facility: CLINIC | Age: 45
Discharge: HOME | End: 2020-03-11
Attending: PHYSICIAN ASSISTANT | Admitting: PHYSICIAN ASSISTANT
Payer: COMMERCIAL

## 2020-03-11 ENCOUNTER — APPOINTMENT (OUTPATIENT)
Dept: CT IMAGING | Facility: CLINIC | Age: 45
End: 2020-03-11
Attending: PHYSICIAN ASSISTANT
Payer: COMMERCIAL

## 2020-03-11 VITALS
DIASTOLIC BLOOD PRESSURE: 65 MMHG | HEART RATE: 88 BPM | SYSTOLIC BLOOD PRESSURE: 115 MMHG | RESPIRATION RATE: 16 BRPM | OXYGEN SATURATION: 94 % | TEMPERATURE: 97 F

## 2020-03-11 DIAGNOSIS — R10.32 LLQ ABDOMINAL PAIN: ICD-10-CM

## 2020-03-11 DIAGNOSIS — N20.0 CALCULUS OF RIGHT KIDNEY: ICD-10-CM

## 2020-03-11 LAB
ALBUMIN UR-MCNC: NEGATIVE MG/DL
ANION GAP SERPL CALCULATED.3IONS-SCNC: 5 MMOL/L (ref 3–14)
APPEARANCE UR: CLEAR
BACTERIA #/AREA URNS HPF: ABNORMAL /HPF
BASOPHILS # BLD AUTO: 0.1 10E9/L (ref 0–0.2)
BASOPHILS NFR BLD AUTO: 1.2 %
BILIRUB UR QL STRIP: NEGATIVE
BUN SERPL-MCNC: 13 MG/DL (ref 7–30)
CALCIUM SERPL-MCNC: 8.6 MG/DL (ref 8.5–10.1)
CHLORIDE SERPL-SCNC: 110 MMOL/L (ref 94–109)
CO2 SERPL-SCNC: 26 MMOL/L (ref 20–32)
COLOR UR AUTO: ABNORMAL
CREAT SERPL-MCNC: 0.83 MG/DL (ref 0.52–1.04)
DIFFERENTIAL METHOD BLD: ABNORMAL
EOSINOPHIL # BLD AUTO: 0.7 10E9/L (ref 0–0.7)
EOSINOPHIL NFR BLD AUTO: 10.8 %
ERYTHROCYTE [DISTWIDTH] IN BLOOD BY AUTOMATED COUNT: 15.8 % (ref 10–15)
GFR SERPL CREATININE-BSD FRML MDRD: 85 ML/MIN/{1.73_M2}
GLUCOSE SERPL-MCNC: 89 MG/DL (ref 70–99)
GLUCOSE UR STRIP-MCNC: NEGATIVE MG/DL
HCG UR QL: NEGATIVE
HCT VFR BLD AUTO: 41.4 % (ref 35–47)
HGB BLD-MCNC: 12.8 G/DL (ref 11.7–15.7)
HGB UR QL STRIP: NEGATIVE
IMM GRANULOCYTES # BLD: 0 10E9/L (ref 0–0.4)
IMM GRANULOCYTES NFR BLD: 0.1 %
KETONES UR STRIP-MCNC: NEGATIVE MG/DL
LEUKOCYTE ESTERASE UR QL STRIP: NEGATIVE
LYMPHOCYTES # BLD AUTO: 2.8 10E9/L (ref 0.8–5.3)
LYMPHOCYTES NFR BLD AUTO: 41.6 %
MCH RBC QN AUTO: 27.9 PG (ref 26.5–33)
MCHC RBC AUTO-ENTMCNC: 30.9 G/DL (ref 31.5–36.5)
MCV RBC AUTO: 90 FL (ref 78–100)
MONOCYTES # BLD AUTO: 0.5 10E9/L (ref 0–1.3)
MONOCYTES NFR BLD AUTO: 8.1 %
MUCOUS THREADS #/AREA URNS LPF: PRESENT /LPF
NEUTROPHILS # BLD AUTO: 2.6 10E9/L (ref 1.6–8.3)
NEUTROPHILS NFR BLD AUTO: 38.2 %
NITRATE UR QL: NEGATIVE
NRBC # BLD AUTO: 0 10*3/UL
NRBC BLD AUTO-RTO: 0 /100
PH UR STRIP: 5.5 PH (ref 5–7)
PLATELET # BLD AUTO: 362 10E9/L (ref 150–450)
POTASSIUM SERPL-SCNC: 3.9 MMOL/L (ref 3.4–5.3)
RBC # BLD AUTO: 4.58 10E12/L (ref 3.8–5.2)
RBC #/AREA URNS AUTO: <1 /HPF (ref 0–2)
SODIUM SERPL-SCNC: 141 MMOL/L (ref 133–144)
SOURCE: ABNORMAL
SP GR UR STRIP: 1.01 (ref 1–1.03)
SQUAMOUS #/AREA URNS AUTO: <1 /HPF (ref 0–1)
UROBILINOGEN UR STRIP-MCNC: NORMAL MG/DL (ref 0–2)
WBC # BLD AUTO: 6.7 10E9/L (ref 4–11)
WBC #/AREA URNS AUTO: <1 /HPF (ref 0–5)

## 2020-03-11 PROCEDURE — 96374 THER/PROPH/DIAG INJ IV PUSH: CPT | Mod: 59

## 2020-03-11 PROCEDURE — 99285 EMERGENCY DEPT VISIT HI MDM: CPT | Mod: 25

## 2020-03-11 PROCEDURE — 85025 COMPLETE CBC W/AUTO DIFF WBC: CPT | Performed by: EMERGENCY MEDICINE

## 2020-03-11 PROCEDURE — 25000125 ZZHC RX 250: Performed by: PHYSICIAN ASSISTANT

## 2020-03-11 PROCEDURE — 81025 URINE PREGNANCY TEST: CPT | Performed by: PHYSICIAN ASSISTANT

## 2020-03-11 PROCEDURE — 80048 BASIC METABOLIC PNL TOTAL CA: CPT | Performed by: EMERGENCY MEDICINE

## 2020-03-11 PROCEDURE — 25000128 H RX IP 250 OP 636: Performed by: PHYSICIAN ASSISTANT

## 2020-03-11 PROCEDURE — 81001 URINALYSIS AUTO W/SCOPE: CPT | Performed by: PHYSICIAN ASSISTANT

## 2020-03-11 PROCEDURE — 74177 CT ABD & PELVIS W/CONTRAST: CPT

## 2020-03-11 PROCEDURE — 25800030 ZZH RX IP 258 OP 636: Performed by: PHYSICIAN ASSISTANT

## 2020-03-11 PROCEDURE — 96361 HYDRATE IV INFUSION ADD-ON: CPT

## 2020-03-11 RX ORDER — HYDROMORPHONE HYDROCHLORIDE 1 MG/ML
0.5 INJECTION, SOLUTION INTRAMUSCULAR; INTRAVENOUS; SUBCUTANEOUS ONCE
Status: COMPLETED | OUTPATIENT
Start: 2020-03-11 | End: 2020-03-11

## 2020-03-11 RX ORDER — IOPAMIDOL 755 MG/ML
500 INJECTION, SOLUTION INTRAVASCULAR ONCE
Status: COMPLETED | OUTPATIENT
Start: 2020-03-11 | End: 2020-03-11

## 2020-03-11 RX ADMIN — SODIUM CHLORIDE 65 ML: 9 INJECTION, SOLUTION INTRAVENOUS at 18:40

## 2020-03-11 RX ADMIN — IOPAMIDOL 100 ML: 755 INJECTION, SOLUTION INTRAVENOUS at 18:40

## 2020-03-11 RX ADMIN — HYDROMORPHONE HYDROCHLORIDE 0.5 MG: 1 INJECTION, SOLUTION INTRAMUSCULAR; INTRAVENOUS; SUBCUTANEOUS at 18:30

## 2020-03-11 RX ADMIN — SODIUM CHLORIDE 1000 ML: 9 INJECTION, SOLUTION INTRAVENOUS at 18:26

## 2020-03-11 ASSESSMENT — ENCOUNTER SYMPTOMS
ABDOMINAL PAIN: 1
CONSTIPATION: 0
DIARRHEA: 0
VOMITING: 0
NAUSEA: 1

## 2020-03-11 NOTE — ED PROVIDER NOTES
"  History     Chief Complaint:  Abdominal Pain    HPI   Holli Thacker is a 44 year old female with a history of fibromyalgia, dysmenorrhea, ovarian cyst, among others who presents with  for evaluation lower abdominal pain, associated with nausea, that began two days ago. The patient states two days ago she began having lower abdominal pain, worse on the left lower quadrant. The pain is exacerbated by eating and bowel movements. She also endorses a mild headache and \"hot and cold flashes\". She presented for evaluation as her symptoms have not subsided.    Here, the patient notes her pain is similar to her previous ovarian cysts, although not as severe and she notes she had her ovaries removed. She denies any emesis, diarrhea, constipation, or other symptoms prompting her presentation.     Allergies:  Azithromycin  Erythromycin  Verapamil      Medications:    Albuterol  Flexeril  Advair  Duoneb  Singulair  Xolair  Protonix  Deltasone  Lyrica  Zoloft  Xeljanz  Effexor  Zomig  Zonegran      Past Medical History:    Anxiety  Arnold-Chiari malformation, type 1   Depression  Fibromyalgia  Migraine  PONV  Rheumatoid arthritis   Asthma   Eczema  Ovarian cyst  Dysmenorrhea  Tobacco abuse     Past Surgical History:    Gyn surgery  Cystectomy ovarian, right  Pelvic laparoscopy   Dilation and curettage  Dental surgery   section  Tubal ligation  Salpingo-oophorectomy, bilateral    Family History:    Father - Alcohol/drug abuse, heart disease, hypertension  Mother - Hyperlipidemia, GI disease, asthma  Brother(s) - Asthma, Gi disease    Social History:  The patient was accompanied to the ED by .  Smoking Status: Current, 0.50 packs/day  Smokeless Tobacco: Never  Alcohol Use: Yes  Drug Use: No   Marital Status:   [2]     Review of Systems   Gastrointestinal: Positive for abdominal pain and nausea. Negative for constipation, diarrhea and vomiting.   All other systems reviewed and are " negative.    Physical Exam     Patient Vitals for the past 24 hrs:   BP Temp Temp src Pulse Heart Rate Resp SpO2   03/11/20 1918 -- -- -- -- -- 16 --   03/11/20 1900 115/65 -- -- 88 -- -- 94 %   03/11/20 1830 -- -- -- -- -- -- 96 %   03/11/20 1628 119/60 97  F (36.1  C) Temporal -- 81 18 99 %     Physical Exam  General: Alert, interactive. No acute distress.   Head:  Scalp is atraumatic.  Eyes:  EOM intact. The pupils are equal, round, and reactive to light. No scleral icterus.   ENT:                                      Ears:  The external ears are normal.    Nose:  The external nose is normal.  Throat:  The oropharynx is normal. Mucus membranes are moist.                 Neck:  Normal range of motion. There is no rigidity.   CV:  Regular rate and rhythm. No murmur. 2+ radial pulses  Resp:  Breath sounds are clear bilaterally. Non-labored, no retractions or accessory muscle use.  GI:  Abdomen is soft, no distension. Diffuse lower abdominal tenderness. No localized tenderness.   MS:  Normal range of motion.   Skin:  Warm and dry.   Neuro:  Strength and sensation grossly intact.   Psych:  Awake. Alert.  Appropriate interactions.     Emergency Department Course   Imaging:  Radiology findings were communicated with the patient who voiced understanding of the findings.    Abd/pelvis CT, IV contrast only TRAUMA/AAA   Abd/pelvis CT,  IV  contrast only TRAUMA / AAA   Final Result   IMPRESSION:    1.  5 mm nonobstructing stone right kidney. 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 No ureteric stone or hydronephrosis.      2.  No appendicitis. The cecum is located in the right upper abdomen without evidence for obstruction.              Laboratory:  Laboratory findings were communicated with the patient who voiced understanding of the findings.    CBC: AWNL (WBC 6.7, HGB 12.8, )   BMP: Chloride 110 (H) o/w WNL (Creatinine 0.83)     UA with Microscopic: Bacteria Few (A),  Mucous urine Present (A) o/w WNL   HCG Qualitative Urine: Negative     Interventions:  Medications   0.9% sodium chloride BOLUS (0 mLs Intravenous Stopped 3/11/20 1859)   HYDROmorphone (PF) (DILAUDID) injection 0.5 mg (0.5 mg Intravenous Given 3/11/20 1830)   CT Scan Flush (65 mLs Intravenous Given 3/11/20 1840)   iopamidol (ISOVUE-370) solution 500 mL (100 mLs Intravenous Given 3/11/20 1840)      Emergency Department Course:  Past medical records, nursing notes, and vitals reviewed.  The patient was sent for a Abd/pelvis CT, IV contrast only, while in the emergency department, results above.    IV was inserted and blood was drawn for laboratory testing, results above.   The patient provided a urine sample here in the emergency department. This was sent for laboratory testing, findings above.     (1653)   I performed an exam of the patient as documented above. History obtained from patient.       I rechecked the patient and discussed results and plan of care.     Findings and plan explained to the Patient. Patient discharged home, status improved, with instructions regarding supportive care, medications, and reasons to return as well as the importance of close follow-up was reviewed.     Impression & Plan   Medical Decision Making:  Holli Thacker is a 44 year old female with medical history as noted above presents emergency department with 2 days of lower abdominal pain.  On exam, she has diffuse low abdominal tenderness without localized tenderness.  There is no rebound or guarding.  Vitals normal on arrival.  Afebrile. . Differential diagnosis is broad and includes diverticulitis, nephrolithiasis, UTI/pyelonephritis, appendicitis, bowel obstruction, colitis, IBD, IBS, etc.  Patient is s/p salpingo-oophorectomy ruling out ovarian etiology.  Lab work including CBC and BMP overall unremarkable with no leukocytosis to suggest infection.  No electrolyte abnormality or acute kidney injury.  UA with no sign of  infection.  CT with no acute findings to suggest cause of patient's pain.  Incidental finding of right renal stone discussed.  Patient was given 1 dose of Dilaudid and on recheck felt improved.  She felt appropriate for discharge home with close follow-up with primary care provider.  I discussed the unclear source of the patient's pain at this time and emphasized importance of close follow-up with primary care provider.  She understands importance of returning to the emergency department should abdominal pain worsen, fevers develop, or any other new/concerning symptoms develop.  She agrees with this plan all questions and concerns addressed prior to discharge home.    Diagnosis:    ICD-10-CM    1. LLQ abdominal pain  R10.32    2. Calculus of right kidney  N20.0      Disposition:  Discharge      Scribe Disclosure:  I, Raymond Herzog, am serving as a scribe at 4:47 PM on 3/11/2020 to document services personally performed by Mercedez Stevens PA-C based on my observations and the provider's statements to me.    3/11/2020   [facility] EMERGENCY DEPARTMENT       Mercedez Stevens PA-C  03/11/20 3823

## 2020-03-11 NOTE — ED AVS SNAPSHOT
[facility] Emergency Department  [address]  [facility] [address]  Phone:  [phone]  Fax:  [phone]                                    Holli Thacker   MRN: [id number]    Department:  [facility] Emergency Department   Date of Visit:  3/11/2020           After Visit Summary Signature Page    I have received my discharge instructions, and my questions have been answered. I have discussed any challenges I see with this plan with the nurse or doctor.    ..........................................................................................................................................  Patient/Patient Representative Signature      ..........................................................................................................................................  Patient Representative Print Name and Relationship to Patient    ..................................................               ................................................  Date                                   Time    ..........................................................................................................................................  Reviewed by Signature/Title    ...................................................              ..............................................  Date                                               Time          22EPIC Rev 08/18

## 2020-03-11 NOTE — ED TRIAGE NOTES
Pt arrives with LLQ abd pain x2 days. Tender to touch, +nausea, no vomiting or diarrhea. ABCs intact.

## 2020-03-11 NOTE — DISCHARGE INSTRUCTIONS
*Get plenty of rest and avoid strenuous activities.  *drink plenty of fluids.   *Follow-up with your doctor for a recheck within 1-2 days.  *Return to the ER if you develop fever, worsening pain, pain that moves to the right lower abdomen, faint or feel like you will faint or become worse in any way.       Discharge Instructions  Abdominal Pain    Abdominal pain (belly pain) can be caused by many things. Your evaluation today does not show the exact cause for your pain. Your provider today has decided that it is unlikely your pain is due to a life threatening problem, or a problem requiring surgery or hospital admission. Sometimes those problems cannot be found right away, so it is very important that you follow up as directed.  Sometimes only the changes which occur over time allow the cause of your pain to be found.    Generally, every Emergency Department visit should have a follow-up clinic visit with either a primary or a specialty clinic/provider. Please follow-up as instructed by your emergency provider today. With abdominal pain, we often recommend very close follow-up, such as the following day.    ADULTS:  Return to the Emergency Department right away if:    You get an oral temperature above 102oF or as directed by your provider.  You have blood in your stools. This may be bright red or appear as black, tarry stools.    You keep vomiting (throwing up) or cannot drink liquids.  You see blood when you vomit.   You cannot have a bowel movement or you cannot pass gas.  Your stomach gets bloated or bigger.  Your skin or the whites of your eyes look yellow.  You faint.  You have bloody, frequent or painful urination (peeing).  You have new symptoms or anything that worries you.    CHILDREN:  Return to the Emergency Department right away if your child has any of the above-listed symptoms or the following:    Pushes your hand away or screams/cries when his/her belly is touched.  You notice your child is very fussy  or weak.  Your child is very tired and is too tired to eat or drink.  Your child is dehydrated.  Signs of dehydration can be:  Significant change in the amount of wet diapers/urine.  Your infant or child starts to have dry mouth and lips, or no saliva (spit) or tears.    PREGNANT WOMEN:  Return to the Emergency Department right away if you have any of the above-listed symptoms or the following:    You have bleeding, leaking fluid or passing tissue from the vagina.  You have worse pain or cramping, or pain in your shoulder or back.  You have vomiting that will not stop.  You have a temperature of 100oF or more.  Your baby is not moving as much as usual.  You faint.  You get a bad headache with or without eye problems and abdominal pain.  You have a seizure.  You have unusual discharge from your vagina and abdominal pain.    Abdominal pain is pretty common during pregnancy.  Your pain may or may not be related to your pregnancy. You should follow-up closely with your OB provider so they can evaluate you and your baby.  Until you follow-up with your regular provider, do the following:     Avoid sex and do not put anything in your vagina.  Drink clear fluids.  Only take medications approved by your provider.    MORE INFORMATION:    Appendicitis:  A possible cause of abdominal pain in any person who still has their appendix is acute appendicitis. Appendicitis is often hard to diagnose.  Testing does not always rule out early appendicitis or other causes of abdominal pain. Close follow-up with your provider and re-evaluations may be needed to figure out the reason for your abdominal pain.    Follow-up:  It is very important that you make an appointment with your clinic and go to the appointment.  If you do not follow-up with your primary provider, it may result in missing an important development which could result in permanent injury or disability and/or lasting pain.  If there is any problem keeping your appointment,  "call your provider or return to the Emergency Department.    Medications:  Take your medications as directed by your provider today.  Before using over-the-counter medications, ask your provider and make sure to take the medications as directed.  If you have any questions about medications, ask your provider.    Diet:  Resume your normal diet as much as possible, but do not eat fried, fatty or spicy foods while you have pain.  Do not drink alcohol or have caffeine.  Do not smoke tobacco.    Probiotics: If you have been given an antibiotic, you may want to also take a probiotic pill or eat yogurt with live cultures. Probiotics have \"good bacteria\" to help your intestines stay healthy. Studies have shown that probiotics help prevent diarrhea (loose stools) and other intestine problems (including C. diff infection) when you take antibiotics. You can buy these without a prescription in the pharmacy section of the store.     If you were given a prescription for medicine here today, be sure to read all of the information (including the package insert) that comes with your prescription.  This will include important information about the medicine, its side effects, and any warnings that you need to know about.  The pharmacist who fills the prescription can provide more information and answer questions you may have about the medicine.  If you have questions or concerns that the pharmacist cannot address, please call or return to the Emergency Department.       Remember that you can always come back to the Emergency Department if you are not able to see your regular provider in the amount of time listed above, if you get any new symptoms, or if there is anything that worries you.   "

## 2020-03-23 ENCOUNTER — INFUSION THERAPY VISIT (OUTPATIENT)
Dept: INFUSION THERAPY | Facility: CLINIC | Age: 45
End: 2020-03-23
Attending: ALLERGY & IMMUNOLOGY
Payer: COMMERCIAL

## 2020-03-23 VITALS
RESPIRATION RATE: 16 BRPM | OXYGEN SATURATION: 100 % | HEART RATE: 74 BPM | DIASTOLIC BLOOD PRESSURE: 86 MMHG | TEMPERATURE: 97.7 F | SYSTOLIC BLOOD PRESSURE: 132 MMHG

## 2020-03-23 DIAGNOSIS — L50.1 URTICARIA, IDIOPATHIC: Primary | ICD-10-CM

## 2020-03-23 PROCEDURE — 25000128 H RX IP 250 OP 636: Performed by: INTERNAL MEDICINE

## 2020-03-23 PROCEDURE — 96372 THER/PROPH/DIAG INJ SC/IM: CPT

## 2020-03-23 RX ADMIN — OMALIZUMAB 300 MG: 150 INJECTION, SOLUTION SUBCUTANEOUS at 13:16

## 2020-03-23 NOTE — PROGRESS NOTES
Infusion Nursing Note:  Holli ROOSEVELT Thacker presents today for Xolair.    Patient seen by provider today: No   present during visit today: Not Applicable.    Note: N/A.    Intravenous Access:  No Intravenous access/labs at this visit.    Treatment Conditions:  Not Applicable.      Post Infusion Assessment:  Patient tolerated injection without incident.  Patient observed for 30 minutes post Xolair per protocol.       Discharge Plan:   Discharge instructions reviewed with: Patient.  Patient and/or family verbalized understanding of discharge instructions and all questions answered.  AVS to patient via [employer].  Patient will return 4/13/2020 for next appointment.   Patient discharged in stable condition accompanied by: self.  Departure Mode: Ambulatory.    Althea Garduno RN

## 2020-04-13 ENCOUNTER — INFUSION THERAPY VISIT (OUTPATIENT)
Dept: INFUSION THERAPY | Facility: CLINIC | Age: 45
End: 2020-04-13
Attending: ALLERGY & IMMUNOLOGY
Payer: COMMERCIAL

## 2020-04-13 VITALS
RESPIRATION RATE: 16 BRPM | DIASTOLIC BLOOD PRESSURE: 71 MMHG | OXYGEN SATURATION: 95 % | TEMPERATURE: 99.1 F | HEART RATE: 77 BPM | SYSTOLIC BLOOD PRESSURE: 126 MMHG

## 2020-04-13 DIAGNOSIS — L50.1 URTICARIA, IDIOPATHIC: Primary | ICD-10-CM

## 2020-04-13 PROCEDURE — 96372 THER/PROPH/DIAG INJ SC/IM: CPT

## 2020-04-13 PROCEDURE — 25000128 H RX IP 250 OP 636: Performed by: PHYSICIAN ASSISTANT

## 2020-04-13 RX ADMIN — OMALIZUMAB 300 MG: 150 INJECTION, SOLUTION SUBCUTANEOUS at 13:13

## 2020-04-13 NOTE — PROGRESS NOTES
Infusion Nursing Note:  Holli Thacker presents today for Xolair.    Patient seen by provider today: No   present during visit today: Not Applicable.    Note: N/A.    Intravenous Access:  No Intravenous access/labs at this visit.    Treatment Conditions:  Not Applicable.      Post Infusion Assessment:  Patient tolerated injection without incident.  Patient observed for 30 minutes post Xolair per protocol.       Discharge Plan:   Discharge instructions reviewed with: Patient.  Patient and/or family verbalized understanding of discharge instructions and all questions answered.  AVS to patient via [employer].  Patient will return 5/4/2020 for next appointment.   Patient discharged in stable condition accompanied by: self.  Departure Mode: Ambulatory.    Althea Garduno RN

## 2020-05-04 ENCOUNTER — INFUSION THERAPY VISIT (OUTPATIENT)
Dept: INFUSION THERAPY | Facility: CLINIC | Age: 45
End: 2020-05-04
Attending: ALLERGY & IMMUNOLOGY
Payer: COMMERCIAL

## 2020-05-04 VITALS
DIASTOLIC BLOOD PRESSURE: 73 MMHG | TEMPERATURE: 98.9 F | HEART RATE: 87 BPM | SYSTOLIC BLOOD PRESSURE: 118 MMHG | RESPIRATION RATE: 16 BRPM | OXYGEN SATURATION: 96 %

## 2020-05-04 DIAGNOSIS — L50.1 URTICARIA, IDIOPATHIC: Primary | ICD-10-CM

## 2020-05-04 PROCEDURE — 25000128 H RX IP 250 OP 636: Performed by: INTERNAL MEDICINE

## 2020-05-04 PROCEDURE — 96372 THER/PROPH/DIAG INJ SC/IM: CPT

## 2020-05-04 RX ADMIN — OMALIZUMAB 300 MG: 150 INJECTION, SOLUTION SUBCUTANEOUS at 13:07

## 2020-05-04 NOTE — PROGRESS NOTES
Infusion Nursing Note:  Holli Thacker presents today for Xolair.    Patient seen by provider today: No   present during visit today: Not Applicable.    Note: N/A.    Intravenous Access:  No Intravenous access/labs at this visit.    Treatment Conditions:  Not Applicable.      Post Infusion Assessment:  Patient tolerated injection without incident.  Patient observed for 30 minutes post xolair per protocol.       Discharge Plan:   AVS to patient via [employer].  Patient stop at  to make next appointments  Patient discharged in stable condition accompanied by: self.  Departure Mode: Ambulatory.    Mena Alvarez RN

## 2020-05-06 ENCOUNTER — HOSPITAL ENCOUNTER (EMERGENCY)
Facility: CLINIC | Age: 45
Discharge: HOME | End: 2020-05-06
Attending: PHYSICIAN ASSISTANT | Admitting: PHYSICIAN ASSISTANT
Payer: COMMERCIAL

## 2020-05-06 ENCOUNTER — APPOINTMENT (OUTPATIENT)
Dept: GENERAL RADIOLOGY | Facility: CLINIC | Age: 45
End: 2020-05-06
Attending: PHYSICIAN ASSISTANT
Payer: COMMERCIAL

## 2020-05-06 VITALS
WEIGHT: 195.33 LBS | HEART RATE: 94 BPM | RESPIRATION RATE: 16 BRPM | OXYGEN SATURATION: 96 % | DIASTOLIC BLOOD PRESSURE: 85 MMHG | BODY MASS INDEX: 31.53 KG/M2 | TEMPERATURE: 98 F | SYSTOLIC BLOOD PRESSURE: 117 MMHG

## 2020-05-06 DIAGNOSIS — J45.901 ASTHMA EXACERBATION: ICD-10-CM

## 2020-05-06 LAB
ANION GAP SERPL CALCULATED.3IONS-SCNC: 1 MMOL/L (ref 3–14)
BASE EXCESS BLDV CALC-SCNC: 1.4 MMOL/L
BASOPHILS # BLD AUTO: 0 10E9/L (ref 0–0.2)
BASOPHILS NFR BLD AUTO: 0.4 %
BUN SERPL-MCNC: 11 MG/DL (ref 7–30)
CALCIUM SERPL-MCNC: 9 MG/DL (ref 8.5–10.1)
CHLORIDE SERPL-SCNC: 109 MMOL/L (ref 94–109)
CO2 SERPL-SCNC: 28 MMOL/L (ref 20–32)
CREAT SERPL-MCNC: 0.74 MG/DL (ref 0.52–1.04)
DIFFERENTIAL METHOD BLD: ABNORMAL
EOSINOPHIL # BLD AUTO: 0 10E9/L (ref 0–0.7)
EOSINOPHIL NFR BLD AUTO: 0.1 %
ERYTHROCYTE [DISTWIDTH] IN BLOOD BY AUTOMATED COUNT: 16.4 % (ref 10–15)
GFR SERPL CREATININE-BSD FRML MDRD: >90 ML/MIN/{1.73_M2}
GLUCOSE SERPL-MCNC: 97 MG/DL (ref 70–99)
HCO3 BLDV-SCNC: 26 MMOL/L (ref 21–28)
HCT VFR BLD AUTO: 43 % (ref 35–47)
HGB BLD-MCNC: 13.4 G/DL (ref 11.7–15.7)
IMM GRANULOCYTES # BLD: 0 10E9/L (ref 0–0.4)
IMM GRANULOCYTES NFR BLD: 0.2 %
INTERPRETATION ECG - MUSE: NORMAL
LYMPHOCYTES # BLD AUTO: 1.2 10E9/L (ref 0.8–5.3)
LYMPHOCYTES NFR BLD AUTO: 14.2 %
MCH RBC QN AUTO: 29.3 PG (ref 26.5–33)
MCHC RBC AUTO-ENTMCNC: 31.2 G/DL (ref 31.5–36.5)
MCV RBC AUTO: 94 FL (ref 78–100)
MONOCYTES # BLD AUTO: 0.4 10E9/L (ref 0–1.3)
MONOCYTES NFR BLD AUTO: 4.6 %
NEUTROPHILS # BLD AUTO: 6.8 10E9/L (ref 1.6–8.3)
NEUTROPHILS NFR BLD AUTO: 80.5 %
NRBC # BLD AUTO: 0 10*3/UL
NRBC BLD AUTO-RTO: 0 /100
O2/TOTAL GAS SETTING VFR VENT: ABNORMAL %
OXYHGB MFR BLDV: 89 %
PCO2 BLDV: 42 MM HG (ref 40–50)
PH BLDV: 7.41 PH (ref 7.32–7.43)
PLATELET # BLD AUTO: 369 10E9/L (ref 150–450)
PO2 BLDV: 60 MM HG (ref 25–47)
POTASSIUM SERPL-SCNC: 4.2 MMOL/L (ref 3.4–5.3)
RBC # BLD AUTO: 4.57 10E12/L (ref 3.8–5.2)
SARS-COV-2 PCR COMMENT: NORMAL
SARS-COV-2 RNA SPEC QL NAA+PROBE: NEGATIVE
SARS-COV-2 RNA SPEC QL NAA+PROBE: NORMAL
SODIUM SERPL-SCNC: 138 MMOL/L (ref 133–144)
SPECIMEN SOURCE: NORMAL
SPECIMEN SOURCE: NORMAL
TROPONIN I SERPL-MCNC: <0.015 UG/L (ref 0–0.04)
WBC # BLD AUTO: 8.4 10E9/L (ref 4–11)

## 2020-05-06 PROCEDURE — 87635 SARS-COV-2 COVID-19 AMP PRB: CPT | Performed by: PHYSICIAN ASSISTANT

## 2020-05-06 PROCEDURE — 96374 THER/PROPH/DIAG INJ IV PUSH: CPT

## 2020-05-06 PROCEDURE — 84484 ASSAY OF TROPONIN QUANT: CPT | Performed by: PHYSICIAN ASSISTANT

## 2020-05-06 PROCEDURE — 71045 X-RAY EXAM CHEST 1 VIEW: CPT

## 2020-05-06 PROCEDURE — 99285 EMERGENCY DEPT VISIT HI MDM: CPT | Mod: 25

## 2020-05-06 PROCEDURE — 85025 COMPLETE CBC W/AUTO DIFF WBC: CPT | Performed by: PHYSICIAN ASSISTANT

## 2020-05-06 PROCEDURE — 25000132 ZZH RX MED GY IP 250 OP 250 PS 637: Performed by: PHYSICIAN ASSISTANT

## 2020-05-06 PROCEDURE — 25000128 H RX IP 250 OP 636: Performed by: PHYSICIAN ASSISTANT

## 2020-05-06 PROCEDURE — 93005 ELECTROCARDIOGRAM TRACING: CPT

## 2020-05-06 PROCEDURE — 82805 BLOOD GASES W/O2 SATURATION: CPT | Performed by: PHYSICIAN ASSISTANT

## 2020-05-06 PROCEDURE — 80048 BASIC METABOLIC PNL TOTAL CA: CPT | Performed by: PHYSICIAN ASSISTANT

## 2020-05-06 RX ORDER — METHYLPREDNISOLONE SODIUM SUCCINATE 125 MG/2ML
125 INJECTION, POWDER, LYOPHILIZED, FOR SOLUTION INTRAMUSCULAR; INTRAVENOUS ONCE
Status: COMPLETED | OUTPATIENT
Start: 2020-05-06 | End: 2020-05-06

## 2020-05-06 RX ORDER — ALBUTEROL SULFATE 90 UG/1
6 AEROSOL, METERED RESPIRATORY (INHALATION) EVERY 6 HOURS PRN
Status: DISCONTINUED | OUTPATIENT
Start: 2020-05-06 | End: 2020-05-06 | Stop reason: HOSPADM

## 2020-05-06 RX ORDER — PREDNISONE 50 MG/1
50 TABLET ORAL DAILY
Qty: 5 TABLET | Refills: 0 | Status: SHIPPED | OUTPATIENT
Start: 2020-05-06 | End: 2020-05-11

## 2020-05-06 RX ADMIN — METHYLPREDNISOLONE SODIUM SUCCINATE 125 MG: 125 INJECTION, POWDER, FOR SOLUTION INTRAMUSCULAR; INTRAVENOUS at 10:02

## 2020-05-06 RX ADMIN — ALBUTEROL SULFATE 6 PUFF: 90 AEROSOL, METERED RESPIRATORY (INHALATION) at 10:02

## 2020-05-06 ASSESSMENT — ENCOUNTER SYMPTOMS
SORE THROAT: 0
COUGH: 1
VOMITING: 0
SHORTNESS OF BREATH: 1
RHINORRHEA: 1
DIARRHEA: 0
FEVER: 0
WHEEZING: 1
NAUSEA: 0

## 2020-05-06 NOTE — ED PROVIDER NOTES
History     Chief Complaint:  Shortness of Breath    HPI  Holli Thacker is a 44 year old female with a history of asthma and RA who presents to the emergency department for evaluation of shortness of breath. The patient reports her allergies have felt they are flaring up and she has been wheezy and short of breath with a dry cough for the last 4 days. She has been using her albuterol inhaler, most recently last night, and was on a prednisone taper down to 10mg per day(for RA flare) but has increased this back to 20 last night and this morning at 0600. With this she continues to wheeze and reports a dry cough with rhinorrhea which all started at the same time when she began needing to have her windows open all the time at home. The patient denies any fever, congestion, chest pain, or sore throat.  She does endorse a chest heaviness. She also denies any nausea, vomiting, or diarrhea. The patient has never needed hospitalization or intubation for her asthma before.       Allergies:  Azithromycin  Erythromycin  Verapamil    Medications:    Albuterol  Flexeril  Advair  Singulair  Omalizumab  Pantoprazole  Lyrica  Zantac  Zoloft  tofacitinib  Effexor  Zomig  Zonisamide  Prednisone     Past Medical History:    Chiari malformation  Urticaria  Spasm of muscle  Chronic migraine without aura  Anxiety  Depression  fibromyalgia  RA  Asthma    Past Surgical History:    GYN surgery  Ovary surgery left  Laparoscopic cystectomy ovarian right    Family History:    History reviewed. No pertinent family history.     Social History:  The patient arrives alone  Smoking: current daily smoker  Alcohol use: no  PCP: Hansa Boles  Marital Status:  [2]      Review of Systems   Constitutional: Negative for fever.   HENT: Positive for rhinorrhea. Negative for congestion and sore throat.    Respiratory: Positive for cough, shortness of breath and wheezing.    Cardiovascular: Negative for chest pain.   Gastrointestinal: Negative for  diarrhea, nausea and vomiting.   All other systems reviewed and are negative.    Physical Exam     Patient Vitals for the past 24 hrs:   BP Temp Temp src Pulse Resp SpO2 Weight   05/06/20 1145 117/85 -- -- 94 16 96 % --   05/06/20 1130 119/68 -- -- 87 -- 94 % --   05/06/20 1115 110/77 -- -- 85 -- 94 % --   05/06/20 1100 (!) 115/92 -- -- 90 -- 97 % --   05/06/20 1045 120/83 -- -- 89 -- 97 % --   05/06/20 1030 124/81 -- -- 89 -- 96 % --   05/06/20 1018 -- -- -- -- -- 97 % --   05/06/20 1015 128/70 -- -- 86 -- -- --   05/06/20 1005 -- -- -- -- -- 98 % --   05/06/20 1000 114/80 -- -- 79 -- 97 % --   05/06/20 0933 (!) 141/78 -- -- -- -- -- 88.6 kg (195 lb 5.2 oz)   05/06/20 0930 -- 98  F (36.7  C) Temporal 97 20 99 % --     Physical Exam  General: Resting comfortably.  Alert.  Head:  The scalp, face, and head appear normal   Eyes:  Conjunctivae and sclerae are normal    ENT:    The oropharynx is normal     Uvula is in the midline       Structures are symmetric. No tonsillar hypertrophy or exudate.   Neck:  No lymphadenopathy   CV:  Regular rate and rhythm     Normal S1/S2   Resp:  Mild diffuse wheezing noted.     No focal findings    Non-labored    No respiratory distress or retractions.  MS:  Normal muscular tone   Skin:  No rash or acute skin lesions noted   Neuro: Speech is normal and fluent.     Emergency Department Course     ECG:  ECG taken at 1047, ECG read at 1049  Sinus rhythm  Normal ECG  When compared with ECG of 13-AUG-2016 14:19,  No significant change was found  Rate 83 bpm. ID interval 148 ms. QRS duration 72 ms. QT/QTc 382/448 ms. P-R-T axes 43 14 26.    Imaging:  Radiology findings were communicated with the patient who voiced understanding of the findings.    XR Chest Port 1 view   IMPRESSION: No acute disease  Reading per radiology    Laboratory:  Laboratory findings were communicated with the patient who voiced understanding of the findings.    CBC:  o/w WNL. (WBC 8.4, HGB 13.4, )   BMP:  Glucose 97, anion gap 1 (L), o/w WNL (Creatinine: 0.74)    Troponin (Collected 1037): <0.015    Blood gas venous: pH Venous 7.41, PCO2 Venous 42, PO2 Venous 60 (H), Bicarbonate 26, Oxyhemoglobin Venous 89, Base Excess 1.4     COVID-19 PCR Nasopharyngeal swab in progress    Interventions:  1002 Methylprednisolone 125 mg IV  1002 Albuterol 6 puff nebulizer     Emergency Department Course:  Past medical records, nursing notes, and vitals reviewed.  0933: I performed an exam of the patient and obtained history, as documented above.     IV was inserted and blood was drawn for laboratory testing, results above.  The patient was sent for an XR while in the emergency department, findings above    1201: I rechecked the patient. Explained findings to patient.    I personally reviewed the laboratory and imaging results with the Patient and answered all related questions prior to discharge.     Findings and plan explained to the Patient. Patient discharged home with instructions regarding supportive care, medications, and reasons to return. The importance of close follow-up was reviewed.   Impression & Plan      Medical Decision Making:  Holli Thacker is a 44 year old female who presents to the ER for evaluation of shortness of breath.  Please refer to the HPI for full details.  She states that it feels similar to previous asthma flares.  She states that she is very sensitive to all the pollens/allergens in the air currently feels this is driving her symptoms.  Vital signs here are reassuring.  The patient is not hypoxic.  She is not requiring oxygen. She was evaluated during a global COVID-19 pandemic, which necessitated consideration that the patient might be at risk for infection with the SARS-CoV-2 virus that causes COVID-19.   Applicable protocols for evaluation were followed during the patient's care.   COVID-19 was considered as part of the patient's evaluation.  Given the patient is asthmatic, and is  immunosuppressive, she does qualify for testing.  COVID testing was sent and pending.  Present history, examination and ED course are consistent with asthma exacerbation.  She feels notably improved following the above bronchodilatory therapy and steroids.  Repeat pulmonary exam is benign and there is no significant work of breathing or hypoxia. CXR is normal. Labs are reassuring demonstrating an obstructive pattern. No indication for hospitalization at this time given the absence of hypoxia, improvement in subjective symptoms, and improvement in objective clinical signs.  Supportive outpatient management is indicated with follow-up in 48-72 hours with PCP.  Will provide 5 day course of oral steroids. COVID testing is pending.  Did discuss home isolation precautions cares.  Red flag symptoms, and reasons for return were discussed and understood.  All questions were answered prior to discharge.  The patient understands and agrees to this plan.       Diagnosis:    ICD-10-CM   1. Asthma exacerbation  J45.901       Disposition:   discharged to home    Discharge Medications:  Prednisone 50mg x 5 days    Scribe Disclosure:  Ashley LECHUGA, am serving as a scribe at 9:33 AM on 5/6/2020 to document services personally performed by Nadiya Hernandez PA based on my observations and the provider's statements to me.    [facility] EMERGENCY DEPARTMENT       Nadiya Hernandez PA-C  05/06/20 5843

## 2020-05-06 NOTE — DISCHARGE INSTRUCTIONS
Discharge Instructions  Asthma    Asthma is a condition causing narrowing and inflammation of the airways that can make it hard to breathe.  Asthma can also cause cough, wheezing, noisy breathing and tightness in the chest.  Asthma can be brought on or  triggered  by many things, including dust, mold, pollen, cigarette smoke, exercise, stress and infections (like the common cold).     Generally, every Emergency Department visit should have a follow-up clinic visit with either a primary or a specialty clinic/provider. Please follow-up as instructed by your emergency provider today.    Return to the Emergency Department if:  Your breathing gets worse.  You need to use your inhaler more often than every 4 hours, or cannot get relief from your inhaler.  You are very weak, or feel much more ill.  You develop new symptoms, such as chest pain.  You cough up blood.  You are vomiting (throwing up) enough that you cannot keep fluids or your medicine down.    What can I do to help myself?  Fill any prescriptions the provider gave you and take them right away--especially antibiotics. Be sure to finish the whole antibiotic prescription.  You may be given a prescription for an inhaler, which can help loosen tight air passages.  Use this as needed, but not more often than directed. Inhalers work much better when used with a spacer.   You may be given a prescription for a steroid to reduce inflammation. Used long-term, these can have some side effects, but for short-term use they are safe. You may notice restlessness or increased appetite (eating more).      You may use non-prescription cough or cold medicines. Cough medicines may help, but do not make the cough go away completely.   Avoid smoke, because this can make you feel worse. If you smoke, this may be a good time to quit! Consider using nicotine lozenges, gum, or patches to reduce cravings.   If you have a fever, Tylenol  (acetaminophen), Motrin  (ibuprofen), or Advil   (ibuprofen), may help bring fever down and may help you feel more comfortable. Be sure to read and follow the package directions, and ask your provider if you have questions.  Be sure to get your flu shot each year.  For certain age groups, the pneumonia shot can help prevent pneumonia.  It is important that you follow up with your regular provider, to be sure that you are improving from this spell (an acute asthma exacerbation), and also to do what you can to keep from having trouble again. Sometimes you need long-term medicines to keep your asthma under control.   If you were given a prescription for medicine here today, be sure to read all of the information (including the package insert) that comes with your prescription.  This will include important information about the medicine, its side effects, and any warnings that you need to know about.  The pharmacist who fills the prescription can provide more information and answer questions you may have about the medicine.  If you have questions or concerns that the pharmacist cannot address, please call or return to the Emergency Department.   Remember that you can always come back to the Emergency Department if you are not able to see your regular provider in the amount of time listed above, if you get any new symptoms, or if there is anything that worries you.    Discharge Instructions  COVID-19    Your Provider has determined that you should practice self-isolation and self-monitoring in order to protect yourself and your community from COVID-19, which is the disease caused by a new coronavirus. The virus spreads from person to person primarily by droplets when an infected person coughs or sneezes and the droplet either lands on another person or that other person touches a surface with the droplet on it. Diagnosis of COVID-19 can be made with a test but many times the test is unavailable or not necessary. There is no specific treatment or medicine for the  disease.    Symptoms of COVID-19  Many people have no symptoms or mild symptoms.  Symptoms may usually appear 4 to 5 days (up to 14 days) after contact with another ill person. Some people will get severe symptoms and pneumonia. Usual symptoms are:     Fever    Cough   Trouble breathing   Less common symptoms are: Headache, body aches, sore    throat, sneezing, diarrhea.    How to Care for Yourself    Stay home.  Most people will recover from illness with mild symptoms.  Isolation by staying home is the best method to prevent the spread of the illness. Do not go to work or school. Have a friend or relative do your shopping. Do not use public transportation (bus, train) or ridesharing (Lyft, Uber).    How long should I stay home?  If you have symptoms of a respiratory disease (fever, cough), you should stay home for at least 7 days, and for 3 days with no fever and improvement of respiratory symptoms--whichever is longer. (Your fever should be gone for 3 days without using fever-reducing medicine.)    For example, if you have a fever and coughing for 4 days, you need to stay home 3 more days with no fever for a total of 7 days. Or, if you have a fever and coughing for 5 days, you need to stay home 3 more days with no fever for a total of 8 days.    Separate yourself from other people in your home.?As much as possible, you should stay in one room and away from other people in your home. Also, use a separate bathroom, if possible. Avoid handling pets or other animals while sick.     Wear a facemask if you need to be around other people and cover your mouth and nose with a tissue when you cough or sneeze.     Avoid sharing personal household items. You should not share dishes, drinking glasses, forks/knives/spoons, towels, or bedding with other people in your home. After using these items, they should be washed with soap and water. Clean parts of your home that are touched often (doorknobs, faucets, countertops, etc.)  daily.     Wash your hands often with soap and water for at least 20 seconds or use an alcohol-based hand  containing at least 60% alcohol.     Avoid touching your face.    Treat your symptoms: Take Acetaminophen (Tylenol) to treat body aches and fever as needed for comfort. Ibuprofen (Advil or Motrin) can be used as well if you still have symptoms after taking Tylenol.  Drink fluids. Rest.    Watch for worsening symptoms, shortness of breath, or difficulty   breathing.    Return to the Emergency Department if:    If you are developing worsening breathing, shortness of breath, or feel worse you should seek medical attention.  If you are uncertain, contact your health care provider/clinic. If you need emergency medical attention, call 911 and tell them you have been ill.

## 2020-05-06 NOTE — ED TRIAGE NOTES
Pt arrives with SOB hx asthma taking prednisone using nebs SOB worse since Saturday. A/o x 3. ABCs intact.

## 2020-05-06 NOTE — ED AVS SNAPSHOT
[facility] Emergency Department  [address]  [facility] [address]  Phone:  [phone]  Fax:  [phone]                                    Holli Thacker   MRN: [id number]    Department:  [facility] Emergency Department   Date of Visit:  5/6/2020           After Visit Summary Signature Page    I have received my discharge instructions, and my questions have been answered. I have discussed any challenges I see with this plan with the nurse or doctor.    ..........................................................................................................................................  Patient/Patient Representative Signature      ..........................................................................................................................................  Patient Representative Print Name and Relationship to Patient    ..................................................               ................................................  Date                                   Time    ..........................................................................................................................................  Reviewed by Signature/Title    ...................................................              ..............................................  Date                                               Time          22EPIC Rev 08/18

## 2020-05-27 ENCOUNTER — INFUSION THERAPY VISIT (OUTPATIENT)
Dept: INFUSION THERAPY | Facility: CLINIC | Age: 45
End: 2020-05-27
Attending: ALLERGY & IMMUNOLOGY
Payer: COMMERCIAL

## 2020-05-27 VITALS
HEART RATE: 83 BPM | DIASTOLIC BLOOD PRESSURE: 72 MMHG | TEMPERATURE: 98.9 F | OXYGEN SATURATION: 96 % | RESPIRATION RATE: 16 BRPM | SYSTOLIC BLOOD PRESSURE: 116 MMHG

## 2020-05-27 DIAGNOSIS — L50.1 URTICARIA, IDIOPATHIC: Primary | ICD-10-CM

## 2020-05-27 PROCEDURE — 25000128 H RX IP 250 OP 636: Performed by: PHYSICIAN ASSISTANT

## 2020-05-27 PROCEDURE — 99207 ZZC NO CHARGE NURSE ONLY: CPT

## 2020-05-27 PROCEDURE — 96372 THER/PROPH/DIAG INJ SC/IM: CPT

## 2020-05-27 RX ORDER — PREDNISONE 20 MG/1
20 TABLET ORAL DAILY
COMMUNITY
End: 2020-10-16

## 2020-05-27 RX ADMIN — OMALIZUMAB 300 MG: 150 INJECTION, SOLUTION SUBCUTANEOUS at 13:49

## 2020-05-27 NOTE — PROGRESS NOTES
Infusion Nursing Note:  Holli Thacker presents today for Xolair Injection.    Patient seen by provider today: No   present during visit today: Not Applicable.    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  Patient reports she does experience hives about 1 week prior to next Xolair injection on her arms and legs.  Hives on arms and legs are almost gone at this time.  Xolair injection frequency is Q 1=4 weeks. Patient denies fevers, chills or signs of infection.    Intravenous Access: N/A      Treatment Conditions: N/A      Post Infusion Assessment:  Patient tolerated injection without incident.  Patient understands that protocol is to monitor her for 30 minutes post Xolair injection.  Patient only able to stay for 15 minutes after Xolair injection because she has another appointment at 2:30.  Patient reports is feeling well and offers no complaints.      Discharge Plan:   Patient discharged in stable condition accompanied by: self.  Departure Mode: Ambulatory.  6/17/20: Xolair Injection  7/8/20: Xolair Injection    Kamilla Kaiser RN, BSN, OCN on 5/27/2020 at 2:51 PM

## 2020-06-18 ENCOUNTER — INFUSION THERAPY VISIT (OUTPATIENT)
Dept: INFUSION THERAPY | Facility: CLINIC | Age: 45
End: 2020-06-18
Attending: ALLERGY & IMMUNOLOGY
Payer: COMMERCIAL

## 2020-06-18 VITALS
TEMPERATURE: 98.3 F | SYSTOLIC BLOOD PRESSURE: 117 MMHG | DIASTOLIC BLOOD PRESSURE: 79 MMHG | RESPIRATION RATE: 16 BRPM | HEART RATE: 86 BPM | OXYGEN SATURATION: 96 %

## 2020-06-18 DIAGNOSIS — L50.1 URTICARIA, IDIOPATHIC: Primary | ICD-10-CM

## 2020-06-18 PROCEDURE — 96372 THER/PROPH/DIAG INJ SC/IM: CPT

## 2020-06-18 PROCEDURE — 25000128 H RX IP 250 OP 636: Performed by: INTERNAL MEDICINE

## 2020-06-18 RX ORDER — UPADACITINIB 15 MG/1
TABLET, EXTENDED RELEASE ORAL
COMMUNITY

## 2020-06-18 RX ADMIN — OMALIZUMAB 300 MG: 150 INJECTION, SOLUTION SUBCUTANEOUS at 14:28

## 2020-06-18 NOTE — PROGRESS NOTES
Infusion Nursing Note:  Holli ROOSEVELT Linoey Kedar presents today for Xolair.    Patient seen by provider today: No   present during visit today: Not Applicable.    Note: N/A.    Intravenous Access:  No Intravenous access/labs at this visit.    Treatment Conditions:  Not Applicable.      Post Infusion Assessment:  Patient tolerated injection without incident.  Patient observed for 30 minutes post Xolair per protocol.       Discharge Plan:   Discharge instructions reviewed with: Patient.  Patient and/or family verbalized understanding of discharge instructions and all questions answered.  AVS to patient via [employer].  Patient will return 7/8/2020 for next appointment.   Patient discharged in stable condition accompanied by: self.  Departure Mode: Ambulatory.    Cheryl Billings RN

## 2020-07-09 ENCOUNTER — INFUSION THERAPY VISIT (OUTPATIENT)
Dept: INFUSION THERAPY | Facility: CLINIC | Age: 45
End: 2020-07-09
Attending: ALLERGY & IMMUNOLOGY
Payer: COMMERCIAL

## 2020-07-09 VITALS
OXYGEN SATURATION: 97 % | TEMPERATURE: 98.8 F | SYSTOLIC BLOOD PRESSURE: 112 MMHG | DIASTOLIC BLOOD PRESSURE: 69 MMHG | RESPIRATION RATE: 16 BRPM | HEART RATE: 82 BPM

## 2020-07-09 DIAGNOSIS — L50.1 URTICARIA, IDIOPATHIC: Primary | ICD-10-CM

## 2020-07-09 PROCEDURE — 25000128 H RX IP 250 OP 636: Performed by: NURSE PRACTITIONER

## 2020-07-09 PROCEDURE — 96372 THER/PROPH/DIAG INJ SC/IM: CPT

## 2020-07-09 RX ADMIN — OMALIZUMAB 300 MG: 150 INJECTION, SOLUTION SUBCUTANEOUS at 14:45

## 2020-07-09 NOTE — PROGRESS NOTES
Nursing Note:  Holli Thacker presents today for xolair.    Patient seen by provider today: No   present during visit today: Not Applicable.    Note: 30 minute observation post xolair.    Intravenous Access:  No Intravenous access/labs at this visit.    Discharge Plan:   Patient will call for next appointment.    Mena Monte RN

## 2020-08-06 ENCOUNTER — INFUSION THERAPY VISIT (OUTPATIENT)
Dept: INFUSION THERAPY | Facility: CLINIC | Age: 45
End: 2020-08-06
Attending: ALLERGY & IMMUNOLOGY
Payer: COMMERCIAL

## 2020-08-06 VITALS
HEART RATE: 82 BPM | SYSTOLIC BLOOD PRESSURE: 98 MMHG | DIASTOLIC BLOOD PRESSURE: 59 MMHG | TEMPERATURE: 98.6 F | OXYGEN SATURATION: 96 % | RESPIRATION RATE: 16 BRPM

## 2020-08-06 DIAGNOSIS — L50.1 URTICARIA, IDIOPATHIC: Primary | ICD-10-CM

## 2020-08-06 PROCEDURE — 25000128 H RX IP 250 OP 636: Performed by: INTERNAL MEDICINE

## 2020-08-06 PROCEDURE — G0463 HOSPITAL OUTPT CLINIC VISIT: HCPCS

## 2020-08-06 RX ADMIN — OMALIZUMAB 300 MG: 150 INJECTION, SOLUTION SUBCUTANEOUS at 10:49

## 2020-08-06 NOTE — PROGRESS NOTES
Infusion Nursing Note:  Holli ROOSEVELT Linoey Kedar presents today for Xolair.    Patient seen by provider today: No   present during visit today: Not Applicable.    Note: One injection in each arm.    Intravenous Access:  No Intravenous access/labs at this visit.    Treatment Conditions:  Not Applicable.    Post Infusion Assessment:  Patient tolerated injection without incident.  Patient observed for 30 minutes post Xolair per protocol.     Discharge Plan:   Discharge instructions reviewed with: Patient.  Patient and/or family verbalized understanding of discharge instructions and all questions answered.  AVS to patient via [employer].  Patient will return 8/27 for next appointment.   Patient discharged in stable condition accompanied by: self.  Departure Mode: Ambulatory.    Mercedez Gallagher RN

## 2020-08-18 ENCOUNTER — TRANSFERRED RECORDS (OUTPATIENT)
Dept: HEALTH INFORMATION MANAGEMENT | Facility: CLINIC | Age: 45
End: 2020-08-18

## 2020-09-04 ENCOUNTER — INFUSION THERAPY VISIT (OUTPATIENT)
Dept: INFUSION THERAPY | Facility: CLINIC | Age: 45
End: 2020-09-04
Attending: ALLERGY & IMMUNOLOGY
Payer: COMMERCIAL

## 2020-09-04 VITALS
TEMPERATURE: 98.5 F | SYSTOLIC BLOOD PRESSURE: 124 MMHG | DIASTOLIC BLOOD PRESSURE: 76 MMHG | RESPIRATION RATE: 16 BRPM | OXYGEN SATURATION: 97 % | HEART RATE: 95 BPM

## 2020-09-04 DIAGNOSIS — L50.1 URTICARIA, IDIOPATHIC: Primary | ICD-10-CM

## 2020-09-04 PROCEDURE — 96372 THER/PROPH/DIAG INJ SC/IM: CPT

## 2020-09-04 PROCEDURE — 25000128 H RX IP 250 OP 636: Performed by: INTERNAL MEDICINE

## 2020-09-04 RX ADMIN — OMALIZUMAB 300 MG: 150 INJECTION, SOLUTION SUBCUTANEOUS at 09:18

## 2020-09-04 NOTE — PROGRESS NOTES
Infusion Nursing Note:  Holli Thacker presents today for Xolair.    Patient seen by provider today: No   present during visit today: Not Applicable.    Note: N/A.    Intravenous Access:  No Intravenous access/labs at this visit.    Treatment Conditions:  Not Applicable.      Post Infusion Assessment:  Patient tolerated injection without incident.  Patient observed for 30 minutes post xolair per protocol.       Discharge Plan:   AVS to patient via [employer].    Patient discharged in stable condition accompanied by: self.  Departure Mode: Ambulatory.    Mena Alvarez RN

## 2020-09-25 ENCOUNTER — INFUSION THERAPY VISIT (OUTPATIENT)
Dept: INFUSION THERAPY | Facility: CLINIC | Age: 45
End: 2020-09-25
Attending: ALLERGY & IMMUNOLOGY
Payer: COMMERCIAL

## 2020-09-25 VITALS
HEART RATE: 81 BPM | OXYGEN SATURATION: 98 % | TEMPERATURE: 99.4 F | DIASTOLIC BLOOD PRESSURE: 62 MMHG | SYSTOLIC BLOOD PRESSURE: 105 MMHG | RESPIRATION RATE: 16 BRPM

## 2020-09-25 DIAGNOSIS — L50.1 URTICARIA, IDIOPATHIC: Primary | ICD-10-CM

## 2020-09-25 PROCEDURE — 25000128 H RX IP 250 OP 636: Performed by: INTERNAL MEDICINE

## 2020-09-25 PROCEDURE — 96372 THER/PROPH/DIAG INJ SC/IM: CPT

## 2020-09-25 RX ADMIN — OMALIZUMAB 300 MG: 150 INJECTION, SOLUTION SUBCUTANEOUS at 10:45

## 2020-09-25 ASSESSMENT — PAIN SCALES - GENERAL: PAINLEVEL: NO PAIN (0)

## 2020-09-25 NOTE — PROGRESS NOTES
Infusion Nursing Note:  Holli ALBERT Garrettgris Thacker presents today for Xolair.    Patient seen by provider today: No   present during visit today: Not Applicable.    Note: Patient aware to contact her provider regarding needing renewed orders prior to next infusion.    Intravenous Access:  No Intravenous access/labs at this visit.    Treatment Conditions:  Not Applicable.      Post Infusion Assessment:  Patient tolerated injection without incident.  Patient observed for 30 minutes post Xolair per protocol.  Site patent and intact, free from redness, edema or discomfort.       Discharge Plan:   Patient will return 10/16.  Patient discharged in stable condition accompanied by: self.  Departure Mode: Ambulatory.    Mercedes Breaux RN

## 2020-10-15 ENCOUNTER — MEDICAL CORRESPONDENCE (OUTPATIENT)
Dept: HEALTH INFORMATION MANAGEMENT | Facility: CLINIC | Age: 45
End: 2020-10-15

## 2020-10-16 ENCOUNTER — INFUSION THERAPY VISIT (OUTPATIENT)
Dept: INFUSION THERAPY | Facility: CLINIC | Age: 45
End: 2020-10-16
Attending: ALLERGY & IMMUNOLOGY
Payer: COMMERCIAL

## 2020-10-16 VITALS
OXYGEN SATURATION: 97 % | RESPIRATION RATE: 16 BRPM | TEMPERATURE: 98.3 F | HEART RATE: 89 BPM | DIASTOLIC BLOOD PRESSURE: 67 MMHG | SYSTOLIC BLOOD PRESSURE: 109 MMHG

## 2020-10-16 DIAGNOSIS — L50.1 URTICARIA, IDIOPATHIC: Primary | ICD-10-CM

## 2020-10-16 PROCEDURE — 96372 THER/PROPH/DIAG INJ SC/IM: CPT | Performed by: INTERNAL MEDICINE

## 2020-10-16 PROCEDURE — 250N000011 HC RX IP 250 OP 636: Performed by: INTERNAL MEDICINE

## 2020-10-16 RX ADMIN — OMALIZUMAB 300 MG: 150 INJECTION, SOLUTION SUBCUTANEOUS at 10:16

## 2020-10-16 ASSESSMENT — PAIN SCALES - GENERAL: PAINLEVEL: MILD PAIN (2)

## 2020-10-16 NOTE — PROGRESS NOTES
Infusion Nursing Note:  Holli ROOSEVELT Thacker presents today for Xolair.    Patient seen by provider today: No   present during visit today: Not Applicable.    Note: N/A.    Intravenous Access:  No Intravenous access/labs at this visit.    Treatment Conditions:  Not Applicable.      Post Infusion Assessment:  Patient tolerated injection without incident.  Patient observed for 30 minutes post Xolair per protocol.  Site patent and intact, free from redness, edema or discomfort.       Discharge Plan:    Patient will return 11/6 for next appointment.  Patient discharged in stable condition accompanied by: self.  Departure Mode: Ambulatory.    Mercedes Breaux RN

## 2020-11-06 ENCOUNTER — INFUSION THERAPY VISIT (OUTPATIENT)
Dept: INFUSION THERAPY | Facility: CLINIC | Age: 45
End: 2020-11-06
Attending: ALLERGY & IMMUNOLOGY
Payer: COMMERCIAL

## 2020-11-06 VITALS
SYSTOLIC BLOOD PRESSURE: 117 MMHG | DIASTOLIC BLOOD PRESSURE: 64 MMHG | OXYGEN SATURATION: 97 % | HEART RATE: 85 BPM | TEMPERATURE: 97.9 F | RESPIRATION RATE: 16 BRPM

## 2020-11-06 DIAGNOSIS — L50.1 URTICARIA, IDIOPATHIC: Primary | ICD-10-CM

## 2020-11-06 PROCEDURE — 96372 THER/PROPH/DIAG INJ SC/IM: CPT

## 2020-11-06 PROCEDURE — 250N000011 HC RX IP 250 OP 636

## 2020-11-06 RX ADMIN — OMALIZUMAB 300 MG: 150 INJECTION, SOLUTION SUBCUTANEOUS at 10:09

## 2020-11-06 NOTE — PROGRESS NOTES
Infusion Nursing Note:  Holli ROOSEVELT Linoey Kedar presents today for Xolair.    Patient seen by provider today: No   present during visit today: Not Applicable.    Note: N/A.    Intravenous Access:  No Intravenous access/labs at this visit.    Treatment Conditions:  Not Applicable.      Post Infusion Assessment:  Patient tolerated injection without incident.  Patient observed for 30 minutes post Xolair per protocol.       Discharge Plan:   Discharge instructions reviewed with: Patient.  Patient and/or family verbalized understanding of discharge instructions and all questions answered.  AVS to patient via [employer].  Patient will return 11/27/2020 for next appointment.   Patient discharged in stable condition accompanied by: self.  Departure Mode: Ambulatory.    Cheryl Billings RN

## 2020-12-04 ENCOUNTER — INFUSION THERAPY VISIT (OUTPATIENT)
Dept: INFUSION THERAPY | Facility: CLINIC | Age: 45
End: 2020-12-04
Attending: ALLERGY & IMMUNOLOGY
Payer: COMMERCIAL

## 2020-12-04 VITALS
SYSTOLIC BLOOD PRESSURE: 108 MMHG | HEART RATE: 81 BPM | TEMPERATURE: 97.4 F | DIASTOLIC BLOOD PRESSURE: 66 MMHG | RESPIRATION RATE: 18 BRPM | OXYGEN SATURATION: 96 %

## 2020-12-04 DIAGNOSIS — L50.1 URTICARIA, IDIOPATHIC: Primary | ICD-10-CM

## 2020-12-04 PROCEDURE — 250N000011 HC RX IP 250 OP 636

## 2020-12-04 PROCEDURE — 96372 THER/PROPH/DIAG INJ SC/IM: CPT

## 2020-12-04 RX ADMIN — OMALIZUMAB 300 MG: 150 INJECTION, SOLUTION SUBCUTANEOUS at 11:00

## 2020-12-04 NOTE — PROGRESS NOTES
Infusion Nursing Note:  Holli Thacker presents today for Xolair.    Patient seen by provider today: No   present during visit today: Not Applicable.    Note: N/A.    Intravenous Access:  No Intravenous access/labs at this visit.    Treatment Conditions:  Not Applicable.      Post Infusion Assessment:  Patient tolerated injection without incident.  Patient observed for 30 minutes post xolair per protocol.       Discharge Plan:   Copy of AVS reviewed with patient and/or family.  Patient will return 12/18 for next appointment.  Patient discharged in stable condition accompanied by: self.  Departure Mode: Ambulatory.    Mena Monte RN

## 2020-12-10 ENCOUNTER — HOSPITAL ENCOUNTER (EMERGENCY)
Facility: CLINIC | Age: 45
Discharge: HOME | End: 2020-12-10
Attending: EMERGENCY MEDICINE | Admitting: EMERGENCY MEDICINE
Payer: COMMERCIAL

## 2020-12-10 ENCOUNTER — APPOINTMENT (OUTPATIENT)
Dept: CT IMAGING | Facility: CLINIC | Age: 45
End: 2020-12-10
Attending: EMERGENCY MEDICINE
Payer: COMMERCIAL

## 2020-12-10 VITALS
HEART RATE: 87 BPM | DIASTOLIC BLOOD PRESSURE: 57 MMHG | OXYGEN SATURATION: 98 % | SYSTOLIC BLOOD PRESSURE: 105 MMHG | RESPIRATION RATE: 20 BRPM | TEMPERATURE: 98.5 F

## 2020-12-10 DIAGNOSIS — R10.84 ABDOMINAL PAIN, GENERALIZED: ICD-10-CM

## 2020-12-10 LAB
ALBUMIN SERPL-MCNC: 3.8 G/DL (ref 3.4–5)
ALBUMIN UR-MCNC: NEGATIVE MG/DL
ALP SERPL-CCNC: 155 U/L (ref 40–150)
ALT SERPL W P-5'-P-CCNC: 25 U/L (ref 0–50)
ANION GAP SERPL CALCULATED.3IONS-SCNC: 6 MMOL/L (ref 3–14)
APPEARANCE UR: CLEAR
AST SERPL W P-5'-P-CCNC: 24 U/L (ref 0–45)
BASOPHILS # BLD AUTO: 0.1 10E9/L (ref 0–0.2)
BASOPHILS NFR BLD AUTO: 0.8 %
BILIRUB SERPL-MCNC: 0.2 MG/DL (ref 0.2–1.3)
BILIRUB UR QL STRIP: NEGATIVE
BUN SERPL-MCNC: 12 MG/DL (ref 7–30)
CALCIUM SERPL-MCNC: 9.6 MG/DL (ref 8.5–10.1)
CHLORIDE SERPL-SCNC: 106 MMOL/L (ref 94–109)
CO2 SERPL-SCNC: 29 MMOL/L (ref 20–32)
COLOR UR AUTO: NORMAL
CREAT SERPL-MCNC: 0.82 MG/DL (ref 0.52–1.04)
DIFFERENTIAL METHOD BLD: NORMAL
EOSINOPHIL # BLD AUTO: 0.2 10E9/L (ref 0–0.7)
EOSINOPHIL NFR BLD AUTO: 2.3 %
ERYTHROCYTE [DISTWIDTH] IN BLOOD BY AUTOMATED COUNT: 14.1 % (ref 10–15)
GFR SERPL CREATININE-BSD FRML MDRD: 86 ML/MIN/{1.73_M2}
GLUCOSE SERPL-MCNC: 102 MG/DL (ref 70–99)
GLUCOSE UR STRIP-MCNC: NEGATIVE MG/DL
HCT VFR BLD AUTO: 44.5 % (ref 35–47)
HGB BLD-MCNC: 14.4 G/DL (ref 11.7–15.7)
HGB UR QL STRIP: NEGATIVE
IMM GRANULOCYTES # BLD: 0 10E9/L (ref 0–0.4)
IMM GRANULOCYTES NFR BLD: 0.3 %
KETONES UR STRIP-MCNC: NEGATIVE MG/DL
LEUKOCYTE ESTERASE UR QL STRIP: NEGATIVE
LIPASE SERPL-CCNC: 361 U/L (ref 73–393)
LYMPHOCYTES # BLD AUTO: 3.1 10E9/L (ref 0.8–5.3)
LYMPHOCYTES NFR BLD AUTO: 47 %
MCH RBC QN AUTO: 32.1 PG (ref 26.5–33)
MCHC RBC AUTO-ENTMCNC: 32.4 G/DL (ref 31.5–36.5)
MCV RBC AUTO: 99 FL (ref 78–100)
MONOCYTES # BLD AUTO: 0.6 10E9/L (ref 0–1.3)
MONOCYTES NFR BLD AUTO: 8.5 %
NEUTROPHILS # BLD AUTO: 2.7 10E9/L (ref 1.6–8.3)
NEUTROPHILS NFR BLD AUTO: 41.1 %
NITRATE UR QL: NEGATIVE
NRBC # BLD AUTO: 0 10*3/UL
NRBC BLD AUTO-RTO: 0 /100
PH UR STRIP: 5.5 PH (ref 5–7)
PLATELET # BLD AUTO: 363 10E9/L (ref 150–450)
POTASSIUM SERPL-SCNC: 3.7 MMOL/L (ref 3.4–5.3)
PROT SERPL-MCNC: 7.9 G/DL (ref 6.8–8.8)
RBC # BLD AUTO: 4.49 10E12/L (ref 3.8–5.2)
SODIUM SERPL-SCNC: 141 MMOL/L (ref 133–144)
SOURCE: NORMAL
SP GR UR STRIP: 1.01 (ref 1–1.03)
UROBILINOGEN UR STRIP-MCNC: NORMAL MG/DL (ref 0–2)
WBC # BLD AUTO: 6.6 10E9/L (ref 4–11)

## 2020-12-10 PROCEDURE — 96375 TX/PRO/DX INJ NEW DRUG ADDON: CPT

## 2020-12-10 PROCEDURE — 258N000003 HC RX IP 258 OP 636: Performed by: EMERGENCY MEDICINE

## 2020-12-10 PROCEDURE — 96374 THER/PROPH/DIAG INJ IV PUSH: CPT | Mod: 59

## 2020-12-10 PROCEDURE — 83690 ASSAY OF LIPASE: CPT | Performed by: EMERGENCY MEDICINE

## 2020-12-10 PROCEDURE — 81003 URINALYSIS AUTO W/O SCOPE: CPT | Performed by: EMERGENCY MEDICINE

## 2020-12-10 PROCEDURE — 80053 COMPREHEN METABOLIC PANEL: CPT | Performed by: EMERGENCY MEDICINE

## 2020-12-10 PROCEDURE — 250N000011 HC RX IP 250 OP 636: Performed by: EMERGENCY MEDICINE

## 2020-12-10 PROCEDURE — 74177 CT ABD & PELVIS W/CONTRAST: CPT

## 2020-12-10 PROCEDURE — 85025 COMPLETE CBC W/AUTO DIFF WBC: CPT | Performed by: EMERGENCY MEDICINE

## 2020-12-10 PROCEDURE — 96361 HYDRATE IV INFUSION ADD-ON: CPT

## 2020-12-10 PROCEDURE — 99285 EMERGENCY DEPT VISIT HI MDM: CPT | Mod: 25

## 2020-12-10 PROCEDURE — 250N000009 HC RX 250: Performed by: EMERGENCY MEDICINE

## 2020-12-10 RX ORDER — POLYETHYLENE GLYCOL 3350 17 G/17G
1 POWDER, FOR SOLUTION ORAL DAILY
Qty: 527 G | Refills: 0 | Status: SHIPPED | OUTPATIENT
Start: 2020-12-10 | End: 2021-01-09

## 2020-12-10 RX ORDER — ONDANSETRON 4 MG/1
4 TABLET, ORALLY DISINTEGRATING ORAL EVERY 8 HOURS PRN
Qty: 10 TABLET | Refills: 0 | Status: SHIPPED | OUTPATIENT
Start: 2020-12-10 | End: 2020-12-13

## 2020-12-10 RX ORDER — OXYCODONE HYDROCHLORIDE 5 MG/1
5 TABLET ORAL EVERY 6 HOURS PRN
Qty: 12 TABLET | Refills: 0 | Status: SHIPPED | OUTPATIENT
Start: 2020-12-10 | End: 2021-01-18

## 2020-12-10 RX ORDER — ONDANSETRON 2 MG/ML
4 INJECTION INTRAMUSCULAR; INTRAVENOUS EVERY 30 MIN PRN
Status: DISCONTINUED | OUTPATIENT
Start: 2020-12-10 | End: 2020-12-10 | Stop reason: HOSPADM

## 2020-12-10 RX ORDER — IOPAMIDOL 755 MG/ML
500 INJECTION, SOLUTION INTRAVASCULAR ONCE
Status: COMPLETED | OUTPATIENT
Start: 2020-12-10 | End: 2020-12-10

## 2020-12-10 RX ORDER — HYDROMORPHONE HYDROCHLORIDE 1 MG/ML
0.5 INJECTION, SOLUTION INTRAMUSCULAR; INTRAVENOUS; SUBCUTANEOUS
Status: DISCONTINUED | OUTPATIENT
Start: 2020-12-10 | End: 2020-12-10 | Stop reason: HOSPADM

## 2020-12-10 RX ADMIN — SODIUM CHLORIDE 64 ML: 9 INJECTION, SOLUTION INTRAVENOUS at 14:55

## 2020-12-10 RX ADMIN — HYDROMORPHONE HYDROCHLORIDE 0.5 MG: 1 INJECTION, SOLUTION INTRAMUSCULAR; INTRAVENOUS; SUBCUTANEOUS at 14:19

## 2020-12-10 RX ADMIN — IOPAMIDOL 98 ML: 755 INJECTION, SOLUTION INTRAVENOUS at 14:53

## 2020-12-10 RX ADMIN — SODIUM CHLORIDE 1000 ML: 9 INJECTION, SOLUTION INTRAVENOUS at 14:18

## 2020-12-10 RX ADMIN — ONDANSETRON 4 MG: 2 INJECTION INTRAMUSCULAR; INTRAVENOUS at 14:18

## 2020-12-10 ASSESSMENT — ENCOUNTER SYMPTOMS
VOMITING: 0
APPETITE CHANGE: 1
FEVER: 0
FATIGUE: 0
BACK PAIN: 1
ABDOMINAL PAIN: 1
DIARRHEA: 0
CHILLS: 0

## 2020-12-10 NOTE — DISCHARGE INSTRUCTIONS

## 2020-12-10 NOTE — ED PROVIDER NOTES
History     Chief Complaint:  Abdominal Pain       HPI  Holli Thacker is a 45 year old year old female with a history of asthma and GERD who presents for evaluation of abdominal pain and vomiting. The patient states that last night she began to have lower abdominal pain and also feels bloated. She notes the pain radiates to her back, and she does not want to eat much because of the pain. She states that walking makes the pain worse. She has a history of ovarian cysts and says this presentation feels similar, but she no longer has ovaries. She does still have her gallbladder and appendix. The patient mentions that she does have a history of kidney stones though still in the kidney, so she has never had any issues with them. The patient denies vomiting, diarrhea, chills, fever, or fatigue.        Allergies:  Azithromycin  Erythromycin  Verapamil       Medications:   Albuterol neb  Flexeril  Advair inhaler  Duoneb  Singulair  Omalizumab  Protonix  Lyrica  Sertraline  Upadacitinib  Effexor  Zolmitriptan  Rinvoq      Medical History:   Anxiety  Arnold-Chiari malformation, type I  Depressive disorder  Fibromyalgia  Migraine  Rheumatoid arthritis  Asthma  Chronic migraine without aura  GERD  TMJ  Vitamin D deficiency  Chronic vertigo  Sinusitis      Surgical History:  Laparoscopic cystectomy ovarian (benign)  Ovary surgery  Dilation and curettage  Dental surgery  Tubal ligation  Salpingo-oophorectomy      Family History:   Alcohol/Drug  Heart disease  Hypertension  Asthma  Hyperlipidemia      Social History:  Smoking Status:  Former Smoker    Type: Cigarettes   Packs/Day: 2016  Smokeless Tobacco: Never Used   Alcohol Use: Negative  Drug Use: Negative    Hansa Boles      Review of Systems   Constitutional: Positive for appetite change. Negative for chills, fatigue and fever.   Gastrointestinal: Positive for abdominal pain. Negative for diarrhea and vomiting.   Musculoskeletal: Positive for back pain.   All other  systems reviewed and are negative.    Physical Exam     Patient Vitals for the past 24 hrs:   BP Temp Pulse Resp SpO2   12/10/20 1430 105/57 -- 87 -- 98 %   12/10/20 1428 -- -- -- -- 98 %   12/10/20 1425 -- -- -- -- (!) 85 %   12/10/20 1419 (!) 101/35 -- 86 -- 97 %   12/10/20 1335 (!) 136/108 98.5  F (36.9  C) 98 20 97 %        Physical Exam  General: Patient is awake, alert and interactive when I enter the room. Appears uncomfortable.   Head: The scalp, face, and head appear normal  Eyes: The pupils are equal, round, and reactive to light. Conjunctivae and sclerae are normal  CV: Regular rate. S1/S2. No murmurs.   Resp: Lungs are clear without wheezes or rales. No respiratory distress.   GI: Abdomen is soft, no rigidity. No evidence of pulsatile mass. No fluid waves or evidence of ascites. No distension. Generalized abdominal discomfort. No focal tenderness. There is no rebound or guarding. No hernias or bruising are noted in detailed exam. No CVA tenderness.     MS: Normal tone. Joints grossly normal without effusions. No asymmetric leg swelling, calf or thigh tenderness.    Skin: No rash or lesions noted. Normal capillary refill noted  Neuro:Speech is normal and fluent. Face is symmetric. Moving all extremities.   Psych:  Normal affect.  Appropriate interactions.    Emergency Department Course     Imaging:  Radiology results were communicated with the patient who voiced understanding of the findings.    CT Abdomen Pelvis w Contrast  1.  Large hiatal hernia.  2.  Abnormal gut rotation is a chronic finding.  3.  Moderate volume of retained colonic stool suggestive of  constipation.  4.  Nonobstructing 6 mm calculus in the right kidney.    Reading per radiology     Laboratory:  Laboratory findings were communicated with the patient who voiced understanding of the findings.    CBC: WBC 6.6, HGB 14.4,   CMP: Glucose 102 (H), Alkphos 155 (H) (Creatinine 0.82) o/w WNL     Lipase: 361    UA with micro:  AWNL    Interventions:   1418 NS 1000 mL IV  1418 Zofran 4 mg IV  1419 Dilaudid 0.5 mg IV    Emergency Department Course:    1346 IV was inserted and blood was drawn for laboratory testing, results above.    1347 Nursing notes and vitals reviewed.    1403 I performed an exam of the patient as documented above.     1452 The patient was sent for CT while in the emergency department, results above.     1514 A urine sample was obtained for laboratory testing as documented above.    1525 Findings and plan explained to the Patient. Patient discharged home with instructions regarding supportive care, medications, and reasons to return. The importance of close follow-up was reviewed. The patient was prescribed as below.    Impression & Plan     Medical Decision Making:  Patient is a 45-year-old female who presents emergency department with generalized lower abdominal pain and bloating.  Patient reports she has had similar pain like this before in the past with her ovarian cysts however she has had bilateral oophorectomy.  Initial evaluation she is hemodynamically stable with no vital signs.  She is afebrile.  Physical exam she does have some generalized abdominal discomfort but no overt focal tenderness, guarding or rebound.  In review of the patient's chart she has had similar evaluations for her lower abdominal pain before in the past which have thought to be due to her ovarian cysts without further significant etiology identified.  Given her significant discomfort here today I did elect to perform a CT of her abdomen and pelvis to investigate for possible surgical pathology.  CT reveals chronic findings of hiatal hernia and abnormal gut rotation which is chronic for this patient but does not reveal any acute surgical pathology.  There is evidence of some moderate constipation on the CT scan which could explain the patient's presentation here today.  Therefore I will start the patient on a MiraLAX regimen and have her  follow-up with her primary care physician.  The remainder of her work-up was reassuring and given her stable vital signs, reassuring CT scan and normal blood work I believe she can be safely managed as an outpatient.  Patient was quite frustrated with the lack of clear diagnosis as this is been similar to work-ups that she has had in the past.  I explained to her that an emergency department it is sometimes difficult to identify chronic pain issues here and that it is sometimes difficult to identify certain diagnoses based on the battery of tests that we obtained in the emergency department.    Diagnosis:     ICD-10-CM    1. Abdominal pain, generalized  R10.84         Disposition:  Discharged to home.    Discharge Medications:  Discharge Medication List as of 12/10/2020  3:29 PM      START taking these medications    Details   ondansetron (ZOFRAN ODT) 4 MG ODT tab Take 1 tablet (4 mg) by mouth every 8 hours as needed for nausea, Disp-10 tablet, R-0, Local Print      oxyCODONE (ROXICODONE) 5 MG tablet Take 1 tablet (5 mg) by mouth every 6 hours as needed for pain, Disp-12 tablet, R-0, Local Print      polyethylene glycol (MIRALAX) 17 GM/Dose powder Take 17 g (1 capful) by mouth daily, Disp-527 g, R-0, Local Print             Scribe Disclosure:  I, Valerie Segovia, am serving as a scribe at 1:47 PM on 12/10/2020 to document services personally performed by Cedric Bal MD based on my observations and the provider's statements to me.      Cedric Bal MD  12/10/20 [id number]

## 2020-12-10 NOTE — ED TRIAGE NOTES
Pt presents with abdominal pain that radiates into her back that began last night with associated nausea. Pt denies any difficulty with urination. Pt alert, oriented x3 ABCs intact

## 2020-12-21 ENCOUNTER — INFUSION THERAPY VISIT (OUTPATIENT)
Dept: INFUSION THERAPY | Facility: CLINIC | Age: 45
End: 2020-12-21
Attending: ALLERGY & IMMUNOLOGY
Payer: COMMERCIAL

## 2020-12-21 VITALS
DIASTOLIC BLOOD PRESSURE: 78 MMHG | TEMPERATURE: 98.8 F | HEART RATE: 88 BPM | RESPIRATION RATE: 16 BRPM | SYSTOLIC BLOOD PRESSURE: 123 MMHG | OXYGEN SATURATION: 97 %

## 2020-12-21 DIAGNOSIS — L50.1 URTICARIA, IDIOPATHIC: Primary | ICD-10-CM

## 2020-12-21 PROCEDURE — 250N000011 HC RX IP 250 OP 636

## 2020-12-21 PROCEDURE — 96372 THER/PROPH/DIAG INJ SC/IM: CPT

## 2020-12-21 RX ADMIN — OMALIZUMAB 300 MG: 150 INJECTION, SOLUTION SUBCUTANEOUS at 15:18

## 2020-12-21 NOTE — PROGRESS NOTES
Infusion Nursing Note:  Holli Thacker presents today for xolair.    Patient seen by provider today: No   present during visit today: Not Applicable.    Note:NA    Intravenous Access:  No Intravenous access/labs at this visit.    Treatment Conditions:  Biological Infusion Checklist:  ~~~ NOTE: If the patient answers yes to any of the questions below, hold the infusion and contact ordering provider or on-call provider.    1. Have you recently had an elevated temperature, fever, chills, productive cough, coughing for 3 weeks or longer or hemoptysis, abnormal vital signs, night sweats,  chest pain or have you noticed a decrease in your appetite, unexplained weight loss or fatigue? No  2. Do you have any open wounds or new incisions? No  3. Do you have any recent or upcoming hospitalizations, surgeries or dental procedures? No  4. Do you currently have or recently have had any signs of illness or infection or are you on any antibiotics? No  5. Have you had any new, sudden or worsening abdominal pain? No  6. Have you or anyone in your household received a live vaccination in the past 4 weeks? Please note:  No live vaccines while on biologic/chemotherapy until 6 months after the last treatment.  Patient can receive the flu vaccine (shot only) and the pneumovax.  It is optimal for the patient to get these vaccines mid cycle, but they can be given at any time as long as it is not on the day of the infusion. No  7. Have you recently been diagnosed with any new nervous system diseases (ie. Multiple sclerosis, Guillain Davis, seizures, neurological changes) or cancer diagnosis? No  8. Are you on any form of radiation or chemotherapy? No  9. Are you pregnant or breast feeding or do you have plans of pregnancy in the future? No  10. Have you been having any signs of worsening depression or suicidal ideations?  (benlysta only) No  11. Have there been any other new onset medical symptoms? No        Post Infusion  Assessment:  Patient tolerated injection without incident.  Patient observed for 30 minutes post xolair per protocol.       Discharge Plan:   Discharge instructions reviewed with: Patient.  Patient and/or family verbalized understanding of discharge instructions and all questions answered.  AVS to patient via [employer].  Patient will return 3-4 weeks for next appointment.   Patient discharged in stable condition accompanied by: self.  Departure Mode: Ambulatory.    Valentina Irene RN

## 2020-12-22 ENCOUNTER — HOSPITAL ENCOUNTER (EMERGENCY)
Facility: CLINIC | Age: 45
Discharge: HOME | End: 2020-12-22
Attending: EMERGENCY MEDICINE | Admitting: EMERGENCY MEDICINE
Payer: COMMERCIAL

## 2020-12-22 ENCOUNTER — APPOINTMENT (OUTPATIENT)
Dept: CT IMAGING | Facility: CLINIC | Age: 45
End: 2020-12-22
Attending: EMERGENCY MEDICINE
Payer: COMMERCIAL

## 2020-12-22 VITALS
DIASTOLIC BLOOD PRESSURE: 66 MMHG | HEART RATE: 76 BPM | OXYGEN SATURATION: 96 % | TEMPERATURE: 98.2 F | WEIGHT: 192.02 LBS | HEIGHT: 67 IN | BODY MASS INDEX: 30.14 KG/M2 | SYSTOLIC BLOOD PRESSURE: 109 MMHG | RESPIRATION RATE: 16 BRPM

## 2020-12-22 DIAGNOSIS — R10.84 ABDOMINAL PAIN, GENERALIZED: ICD-10-CM

## 2020-12-22 DIAGNOSIS — R50.9 FEVER, UNSPECIFIED FEVER CAUSE: ICD-10-CM

## 2020-12-22 LAB
ALBUMIN SERPL-MCNC: 3.6 G/DL (ref 3.4–5)
ALBUMIN UR-MCNC: NEGATIVE MG/DL
ALP SERPL-CCNC: 130 U/L (ref 40–150)
ALT SERPL W P-5'-P-CCNC: 26 U/L (ref 0–50)
ANION GAP SERPL CALCULATED.3IONS-SCNC: 3 MMOL/L (ref 3–14)
APPEARANCE UR: CLEAR
AST SERPL W P-5'-P-CCNC: 23 U/L (ref 0–45)
B-HCG FREE SERPL-ACNC: <5 IU/L
BASOPHILS # BLD AUTO: 0.1 10E9/L (ref 0–0.2)
BASOPHILS NFR BLD AUTO: 1 %
BILIRUB SERPL-MCNC: 0.3 MG/DL (ref 0.2–1.3)
BILIRUB UR QL STRIP: NEGATIVE
BUN SERPL-MCNC: 10 MG/DL (ref 7–30)
CALCIUM SERPL-MCNC: 9.2 MG/DL (ref 8.5–10.1)
CHLORIDE SERPL-SCNC: 110 MMOL/L (ref 94–109)
CO2 SERPL-SCNC: 29 MMOL/L (ref 20–32)
COLOR UR AUTO: ABNORMAL
CREAT SERPL-MCNC: 0.88 MG/DL (ref 0.52–1.04)
DIFFERENTIAL METHOD BLD: NORMAL
EOSINOPHIL # BLD AUTO: 0.2 10E9/L (ref 0–0.7)
EOSINOPHIL NFR BLD AUTO: 2.9 %
ERYTHROCYTE [DISTWIDTH] IN BLOOD BY AUTOMATED COUNT: 13.9 % (ref 10–15)
FLUABV+SARS-COV-2+RSV PNL RESP NAA+PROBE: NEGATIVE
FLUABV+SARS-COV-2+RSV PNL RESP NAA+PROBE: NEGATIVE
GFR SERPL CREATININE-BSD FRML MDRD: 79 ML/MIN/{1.73_M2}
GLUCOSE SERPL-MCNC: 107 MG/DL (ref 70–99)
GLUCOSE UR STRIP-MCNC: NEGATIVE MG/DL
HCT VFR BLD AUTO: 42.1 % (ref 35–47)
HGB BLD-MCNC: 13.6 G/DL (ref 11.7–15.7)
HGB UR QL STRIP: NEGATIVE
IMM GRANULOCYTES # BLD: 0 10E9/L (ref 0–0.4)
IMM GRANULOCYTES NFR BLD: 0.2 %
KETONES UR STRIP-MCNC: NEGATIVE MG/DL
LABORATORY COMMENT REPORT: NORMAL
LACTATE BLD-SCNC: 1.8 MMOL/L (ref 0.7–2)
LEUKOCYTE ESTERASE UR QL STRIP: NEGATIVE
LYMPHOCYTES # BLD AUTO: 2.5 10E9/L (ref 0.8–5.3)
LYMPHOCYTES NFR BLD AUTO: 48 %
MCH RBC QN AUTO: 32.3 PG (ref 26.5–33)
MCHC RBC AUTO-ENTMCNC: 32.3 G/DL (ref 31.5–36.5)
MCV RBC AUTO: 100 FL (ref 78–100)
MONOCYTES # BLD AUTO: 0.5 10E9/L (ref 0–1.3)
MONOCYTES NFR BLD AUTO: 9.3 %
MUCOUS THREADS #/AREA URNS LPF: PRESENT /LPF
NEUTROPHILS # BLD AUTO: 2 10E9/L (ref 1.6–8.3)
NEUTROPHILS NFR BLD AUTO: 38.6 %
NITRATE UR QL: NEGATIVE
NRBC # BLD AUTO: 0 10*3/UL
NRBC BLD AUTO-RTO: 0 /100
PH UR STRIP: 5.5 PH (ref 5–7)
PLATELET # BLD AUTO: 347 10E9/L (ref 150–450)
POTASSIUM SERPL-SCNC: 3.6 MMOL/L (ref 3.4–5.3)
PROT SERPL-MCNC: 7.4 G/DL (ref 6.8–8.8)
RBC # BLD AUTO: 4.21 10E12/L (ref 3.8–5.2)
RBC #/AREA URNS AUTO: <1 /HPF (ref 0–2)
RSV RNA SPEC QL NAA+PROBE: NORMAL
SARS-COV-2 RNA SPEC QL NAA+PROBE: NEGATIVE
SODIUM SERPL-SCNC: 142 MMOL/L (ref 133–144)
SOURCE: ABNORMAL
SP GR UR STRIP: 1.01 (ref 1–1.03)
SPECIMEN SOURCE: NORMAL
SQUAMOUS #/AREA URNS AUTO: 1 /HPF (ref 0–1)
UROBILINOGEN UR STRIP-MCNC: NORMAL MG/DL (ref 0–2)
WBC # BLD AUTO: 5.2 10E9/L (ref 4–11)
WBC #/AREA URNS AUTO: <1 /HPF (ref 0–5)

## 2020-12-22 PROCEDURE — 250N000011 HC RX IP 250 OP 636: Performed by: EMERGENCY MEDICINE

## 2020-12-22 PROCEDURE — 96374 THER/PROPH/DIAG INJ IV PUSH: CPT | Mod: 59

## 2020-12-22 PROCEDURE — 87636 SARSCOV2 & INF A&B AMP PRB: CPT | Performed by: EMERGENCY MEDICINE

## 2020-12-22 PROCEDURE — 85025 COMPLETE CBC W/AUTO DIFF WBC: CPT | Performed by: EMERGENCY MEDICINE

## 2020-12-22 PROCEDURE — 96361 HYDRATE IV INFUSION ADD-ON: CPT

## 2020-12-22 PROCEDURE — 250N000009 HC RX 250: Performed by: EMERGENCY MEDICINE

## 2020-12-22 PROCEDURE — 81001 URINALYSIS AUTO W/SCOPE: CPT | Performed by: EMERGENCY MEDICINE

## 2020-12-22 PROCEDURE — 96375 TX/PRO/DX INJ NEW DRUG ADDON: CPT

## 2020-12-22 PROCEDURE — 83605 ASSAY OF LACTIC ACID: CPT | Performed by: EMERGENCY MEDICINE

## 2020-12-22 PROCEDURE — 84702 CHORIONIC GONADOTROPIN TEST: CPT

## 2020-12-22 PROCEDURE — 74177 CT ABD & PELVIS W/CONTRAST: CPT

## 2020-12-22 PROCEDURE — 80053 COMPREHEN METABOLIC PANEL: CPT | Performed by: EMERGENCY MEDICINE

## 2020-12-22 PROCEDURE — 258N000003 HC RX IP 258 OP 636: Performed by: EMERGENCY MEDICINE

## 2020-12-22 PROCEDURE — C9803 HOPD COVID-19 SPEC COLLECT: HCPCS

## 2020-12-22 PROCEDURE — 99285 EMERGENCY DEPT VISIT HI MDM: CPT | Mod: 25

## 2020-12-22 RX ORDER — IOPAMIDOL 755 MG/ML
500 INJECTION, SOLUTION INTRAVASCULAR ONCE
Status: COMPLETED | OUTPATIENT
Start: 2020-12-22 | End: 2020-12-22

## 2020-12-22 RX ORDER — ONDANSETRON 2 MG/ML
4 INJECTION INTRAMUSCULAR; INTRAVENOUS
Status: COMPLETED | OUTPATIENT
Start: 2020-12-22 | End: 2020-12-22

## 2020-12-22 RX ORDER — KETOROLAC TROMETHAMINE 15 MG/ML
15 INJECTION, SOLUTION INTRAMUSCULAR; INTRAVENOUS ONCE
Status: COMPLETED | OUTPATIENT
Start: 2020-12-22 | End: 2020-12-22

## 2020-12-22 RX ORDER — SODIUM CHLORIDE 9 MG/ML
INJECTION, SOLUTION INTRAVENOUS CONTINUOUS
Status: DISCONTINUED | OUTPATIENT
Start: 2020-12-22 | End: 2020-12-22 | Stop reason: HOSPADM

## 2020-12-22 RX ADMIN — SODIUM CHLORIDE 64 ML: 9 INJECTION, SOLUTION INTRAVENOUS at 14:25

## 2020-12-22 RX ADMIN — KETOROLAC TROMETHAMINE 15 MG: 15 INJECTION, SOLUTION INTRAMUSCULAR; INTRAVENOUS at 13:54

## 2020-12-22 RX ADMIN — IOPAMIDOL 96 ML: 755 INJECTION, SOLUTION INTRAVENOUS at 14:23

## 2020-12-22 RX ADMIN — SODIUM CHLORIDE 1000 ML: 9 INJECTION, SOLUTION INTRAVENOUS at 13:55

## 2020-12-22 RX ADMIN — ONDANSETRON 4 MG: 2 INJECTION INTRAMUSCULAR; INTRAVENOUS at 13:54

## 2020-12-22 ASSESSMENT — ENCOUNTER SYMPTOMS
ABDOMINAL PAIN: 1
BLOOD IN STOOL: 0
FEVER: 1
CHILLS: 1
VOMITING: 0

## 2020-12-22 ASSESSMENT — MIFFLIN-ST. JEOR: SCORE: 1548.63

## 2020-12-22 NOTE — DISCHARGE INSTRUCTIONS
Quarantine until results of your COVID test are back and are negative.    Discharge Instructions  COVID-19    COVID-19 is the disease caused by a new coronavirus. The virus spreads from person-to-person primarily by droplets when an infected person coughs or sneezes and the droplet either lands on another person or that other person touches a surface with the droplet on it. There are tests available to diagnose COVID-19. There is no specific treatment or medicine for the disease.    You may have been diagnosed with COVID, may be being tested for COVID and have a pending test result, or may have been exposed to COVID.    Symptoms of COVID-19    Many people have no symptoms or mild symptoms.  Symptoms may usually appear 4 to 5 days (up to 14 days) after contact with a person with COVID-19. Some people will get severe symptoms and pneumonia. Usual symptoms are:     ? Fever  ? Cough  ? Trouble breathing    Less common symptoms are: Headache, body aches, sore throat, sneezing, diarrhea, loss of taste or smell.    Isolation and Quarantine    You were seen because you have symptoms, had an exposure, or had some other concern about possible COVID. The best way to stop the spread of the virus is to avoid contact with others.  Isolation refers to sick people staying away from people who are not sick. A person in quarantine is limiting activity because they were exposed and are waiting to see if they might become sick.    If you test positive for COVID, you should stay home (isolation) for at least 10 days after your symptoms began, and for 24 hours with no fever and improvement of symptoms--whichever is longer. (Your fever should be gone for 24 hours without using fever-reducing medicine). If you have no symptoms, you should stay home (isolation) for 10 days from the day of the test.    For example, if you have a fever and cough for 6 days, you need to stay home 4 more days with no fever for a total of 10 days. Or, if you  have a fever and cough for 10 days, you need to stay home one more day with no fever for a total of 11 days.    If you have a high-risk exposure to COVID (you spent 15 minutes or more within six feet of somebody who has COVID), you should stay home (quarantine) for 14 days. Even if you test negative for COVID, the CDC recommends a 14-day quarantine from the time of your last exposure to that individual. There are options for a shortened (<14 day quarantine) you can review at:    https://www.health.[facility]./diseases/coronavirus/close.html#long    If you have symptoms but a negative test, you should stay at home until you are symptom-free and without fever for 24 hours, using the same judgment you would for when it is safe to return to work/school from strep throat, influenza, or the common cold. If you worsen, you should consider being re-evaluated.    If you are being tested for COVID and your test is pending, you should stay home until you know your test result.    How should I protect myself and others?    Do not go to work or school. Have a friend or relative do your shopping. Do not use public transportation (bus, train) or ridesharing (Lyft, Uber).    Separate yourself from other people in your home. As much as possible, you should stay in one room and away from other people in your home. Also, use a separate bathroom, if possible. Avoid handling pets or other animals while sick.     Wear a facemask if you need to be around other people and cover your mouth and nose with a tissue when you cough or sneeze.     Avoid sharing personal household items. You should not share dishes, drinking glasses, forks/knives/spoons, towels, or bedding with other people in your home. After using these items, they should be washed with soap and water. Clean parts of your home that are touched often (doorknobs, faucets, countertops, etc.) daily.     Wash your hands often with soap and water for at least 20 seconds or use an  alcohol-based hand  containing at least 60% alcohol.     Avoid touching your face.    Treat your symptoms. You can take Acetaminophen (Tylenol) to treat body aches and fever as needed for comfort. Ibuprofen (Advil or Motrin) can be used as well if you still have symptoms after taking Tylenol. Drink fluids. Rest.    Watch for worsening symptoms such as shortness of breath/difficulty breathing or very severe weakness.    Employers/workplaces are being asked by the Centers for Disease Control (CDC) to not request notes/documentation for you to return to work or prove that you were ill. You may choose to show your employer this paperwork. Also, repeat testing should not be required to return to work.    Return to the Emergency Department if:    If you are developing worsening breathing, shortness of breath, or feel worse you should seek medical attention.  If you are uncertain, contact your health care provider/clinic. If you need emergency medical attention, call 911 and tell them you have been ill.

## 2020-12-22 NOTE — ED TRIAGE NOTES
Patient endorses abdominal pain after eating. Has had the pain in the past, presented to ED, but it continues to occur despite medications given. VSS.

## 2020-12-22 NOTE — ED AVS SNAPSHOT
[facility] Emergency Dept  [address]  [facility] [address]  Phone: [phone]  Fax: [phone]                                    Holli Thacker   MRN: [id number]    Department: [facility] Emergency Dept   Date of Visit: 12/22/2020           After Visit Summary Signature Page    I have received my discharge instructions, and my questions have been answered. I have discussed any challenges I see with this plan with the nurse or doctor.    ..........................................................................................................................................  Patient/Patient Representative Signature      ..........................................................................................................................................  Patient Representative Print Name and Relationship to Patient    ..................................................               ................................................  Date                                   Time    ..........................................................................................................................................  Reviewed by Signature/Title    ...................................................              ..............................................  Date                                               Time          22EPIC Rev 08/18

## 2020-12-22 NOTE — ED PROVIDER NOTES
History   Chief Complaint:  Abdominal Pain       HPI   Holli Thacker is a 45 year old female with history of GERD who presents with abdominal pain. Patient was seen here in the ED 2 weeks ago for similar abdominal pain which started 2 months ago. She was diagnosed with constipation and started on medications. She says the pain has persisted and is worse after she eats. Her pain is localized to her lower abdomen. She also endorses having a fever and chills that started last night. She has been able to drink fluids and has been remaining hydrated. She denies any vomit or black and bloody stool. She denies ever having a colonoscopy and no family history of colon cancer.       Review of Systems   Constitutional: Positive for chills and fever.   Gastrointestinal: Positive for abdominal pain. Negative for blood in stool and vomiting.   All other systems reviewed and are negative.      Allergies:  Azithromycin  Erythromycin  Verapamil     Medications:   Albuterol neb  Flexeril  Advair inhaler  Duoneb  Singulair  Omalizumab  Protonix  Lyrica  Sertraline  Upadacitinib  Effexor  Zolmitriptan  Rinvoq     Medical History:   Anxiety  Arnold-Chiari malformation, type I  Depressive disorder  Fibromyalgia  Migraine  Rheumatoid arthritis  Asthma  Chronic migraine without aura  GERD  TMJ  Vitamin D deficiency  Chronic vertigo  Sinusitis    Surgical History:  Laparoscopic cystectomy ovarian (benign)  Ovary surgery  Dilation and curettage  Dental surgery  Tubal ligation  Salpingo-oophorectomy     Family History:   Alcohol/Drug  Heart disease  Hypertension  Asthma  Hyperlipidemia     Social History:  Smoking Status:  Former Smoker       Physical Exam     Patient Vitals for the past 24 hrs:   BP Temp Temp src Pulse Resp SpO2 Height Weight   12/22/20 1505 -- 98.5  F (36.9  C) Oral -- -- -- -- --   12/22/20 1314 -- -- -- -- -- 96 % -- --   12/22/20 1255 108/43 -- -- 92 -- 99 % -- --   12/22/20 1222 123/76 100.3  F (37.9  C) Oral  "88 16 97 % 1.702 m (5' 7\") 87.1 kg (192 lb 0.3 oz)       Physical Exam  GEN: alert    HEAD: atraumatic    EYES: pupils reactive, extraocular muscles intact, conjunctivae normal    ENT: Moist oral mucosa, oral pharynx clear; nose clear    NECK: Normal ROM, trachea midline    RESPIRATORY: no tachypnea, normal work of breathing, breath sounds clear to auscultation    CVS: normal S1/S2, no murmurs/rubs/gallops    ABDOMEN: Tenderable just above and below the umbilicus. No bowel sounds, very quiet.     MUSCULOSKELETAL: no deformities    SKIN: warm and dry, no acute rashes or ulceration, no erythema     NEURO: GCS 15, cranial nerves intact.  Motor and sensory- no focal deficits.     LYMPH: no lymphadenopathy    PSYCHE:  Normal    Emergency Department Course     Imaging:  CT abdomen pelvis with contrast:  1. Large hiatal hernia, unchanged. 2. Chronic gut malrotation without acute abnormality. 3. Unchanged 6 mm nonobstructing calculus in the right kidney. 4. No acute abnormality evident. Reading per radiology.     Laboratory:  CBC: WBC: 5.2, HGB: 13.6, PLT: 347  CMP: Glucose 107 (H), Chloride: 110 (H), o/w WNL (Creatinine: 0.88)  Lactic acid (1258): 1.8  ISTAT HCG quantitative pregnancy POCT: <5.0  UA: mucous present o/w WNL    Symptomatic Influenza A/B & COVID-19 Virus PCR: Pending     Emergency Department Course:    Reviewed:  nursing notes, vitals, past medical history and care everywhere    Assessments:    1340 Initial assessment     1505 I rechecked the patient and discussed the results of her workup thus far.     Interventions:  1354 Zofran 4mg IV  1354 Toradol 15 mg IV  1355 NS 1L IV Bolus    Disposition:  The patient was discharged to home.       Impression & Plan     Covid-19  Holli Thacker was evaluated during a global COVID-19 pandemic, which necessitated consideration that the patient might be at risk for infection with the SARS-CoV-2 virus that causes COVID-19.   Applicable protocols for evaluation were " followed during the patient's care.   COVID-19 was considered as part of the patient's evaluation. The plan for testing is:  a test was obtained during this visit.    Medical Decision Making:  Patient is a 45-year-old female has had 2 months of abdominal pain comes in with continued lower abdominal pain left and right. She was diagnosed 2 months ago with chronic malrotation of her colon which was felt to be due to history of constipation. She continues to have ongoing pain and is been seen by her PCP who called her today while she was here to set her up for a colonoscopy. Her CT scan today shows no acute abnormality and all of her blood work is normal including a normal white blood count and comprehensive metabolic profile. In fact her urine is clear and normal as well.    She did come in with a low-grade temperature of 100.3. There is no clear focus of infection along that line. Because of that I am going to swab her for Covid and influenza and have her quarantine until the results of that is back. She will follow-up as directed by her PCP for the colonoscopy and further work-up of her chronic abdominal pain. If she develops new or worsening symptoms in the interim she should return here.      Diagnosis:    ICD-10-CM    1. Abdominal pain, generalized  R10.84    2. Fever, unspecified fever cause  R50.9        Scribe Disclosure:  I, Avtar Cleaning, am serving as a scribe at 12:30 PM on 12/22/2020 to document services personally performed by Mulugeta Zaman MD based on my observations and the provider's statements to me.            Mulugeta Zaman MD  12/23/20 0659

## 2021-01-13 ENCOUNTER — HOSPITAL ENCOUNTER (EMERGENCY)
Facility: CLINIC | Age: 46
Discharge: HOME | End: 2021-01-13
Attending: EMERGENCY MEDICINE | Admitting: EMERGENCY MEDICINE
Payer: COMMERCIAL

## 2021-01-13 VITALS
TEMPERATURE: 97.7 F | BODY MASS INDEX: 30.13 KG/M2 | OXYGEN SATURATION: 97 % | DIASTOLIC BLOOD PRESSURE: 57 MMHG | WEIGHT: 192 LBS | HEART RATE: 69 BPM | RESPIRATION RATE: 18 BRPM | HEIGHT: 67 IN | SYSTOLIC BLOOD PRESSURE: 103 MMHG

## 2021-01-13 DIAGNOSIS — K44.9 HIATAL HERNIA: ICD-10-CM

## 2021-01-13 DIAGNOSIS — R10.84 ABDOMINAL PAIN, GENERALIZED: ICD-10-CM

## 2021-01-13 LAB
ALBUMIN SERPL-MCNC: 3.5 G/DL (ref 3.4–5)
ALP SERPL-CCNC: 122 U/L (ref 40–150)
ALT SERPL W P-5'-P-CCNC: 30 U/L (ref 0–50)
ANION GAP SERPL CALCULATED.3IONS-SCNC: <1 MMOL/L (ref 3–14)
AST SERPL W P-5'-P-CCNC: 24 U/L (ref 0–45)
B-HCG FREE SERPL-ACNC: <5 IU/L
BASOPHILS # BLD AUTO: 0 10E9/L (ref 0–0.2)
BASOPHILS NFR BLD AUTO: 0.5 %
BILIRUB SERPL-MCNC: 0.2 MG/DL (ref 0.2–1.3)
BUN SERPL-MCNC: 14 MG/DL (ref 7–30)
CALCIUM SERPL-MCNC: 8.2 MG/DL (ref 8.5–10.1)
CHLORIDE SERPL-SCNC: 112 MMOL/L (ref 94–109)
CO2 SERPL-SCNC: 29 MMOL/L (ref 20–32)
CREAT SERPL-MCNC: 0.79 MG/DL (ref 0.52–1.04)
DIFFERENTIAL METHOD BLD: NORMAL
EOSINOPHIL # BLD AUTO: 0.1 10E9/L (ref 0–0.7)
EOSINOPHIL NFR BLD AUTO: 1.6 %
ERYTHROCYTE [DISTWIDTH] IN BLOOD BY AUTOMATED COUNT: 14 % (ref 10–15)
GFR SERPL CREATININE-BSD FRML MDRD: >90 ML/MIN/{1.73_M2}
GLUCOSE SERPL-MCNC: 100 MG/DL (ref 70–99)
HCT VFR BLD AUTO: 39.4 % (ref 35–47)
HGB BLD-MCNC: 12.8 G/DL (ref 11.7–15.7)
IMM GRANULOCYTES # BLD: 0 10E9/L (ref 0–0.4)
IMM GRANULOCYTES NFR BLD: 0.3 %
LIPASE SERPL-CCNC: 218 U/L (ref 73–393)
LYMPHOCYTES # BLD AUTO: 2.6 10E9/L (ref 0.8–5.3)
LYMPHOCYTES NFR BLD AUTO: 32.3 %
MCH RBC QN AUTO: 31.9 PG (ref 26.5–33)
MCHC RBC AUTO-ENTMCNC: 32.5 G/DL (ref 31.5–36.5)
MCV RBC AUTO: 98 FL (ref 78–100)
MONOCYTES # BLD AUTO: 0.7 10E9/L (ref 0–1.3)
MONOCYTES NFR BLD AUTO: 8.2 %
NEUTROPHILS # BLD AUTO: 4.5 10E9/L (ref 1.6–8.3)
NEUTROPHILS NFR BLD AUTO: 57.1 %
NRBC # BLD AUTO: 0 10*3/UL
NRBC BLD AUTO-RTO: 0 /100
PLATELET # BLD AUTO: 328 10E9/L (ref 150–450)
POTASSIUM SERPL-SCNC: 3.6 MMOL/L (ref 3.4–5.3)
PROT SERPL-MCNC: 6.8 G/DL (ref 6.8–8.8)
RBC # BLD AUTO: 4.01 10E12/L (ref 3.8–5.2)
SODIUM SERPL-SCNC: 141 MMOL/L (ref 133–144)
WBC # BLD AUTO: 7.9 10E9/L (ref 4–11)

## 2021-01-13 PROCEDURE — 250N000011 HC RX IP 250 OP 636: Performed by: EMERGENCY MEDICINE

## 2021-01-13 PROCEDURE — 96374 THER/PROPH/DIAG INJ IV PUSH: CPT

## 2021-01-13 PROCEDURE — 85025 COMPLETE CBC W/AUTO DIFF WBC: CPT | Performed by: EMERGENCY MEDICINE

## 2021-01-13 PROCEDURE — 83690 ASSAY OF LIPASE: CPT | Performed by: EMERGENCY MEDICINE

## 2021-01-13 PROCEDURE — 99284 EMERGENCY DEPT VISIT MOD MDM: CPT | Mod: 25

## 2021-01-13 PROCEDURE — 80053 COMPREHEN METABOLIC PANEL: CPT | Performed by: EMERGENCY MEDICINE

## 2021-01-13 PROCEDURE — 250N000013 HC RX MED GY IP 250 OP 250 PS 637: Performed by: EMERGENCY MEDICINE

## 2021-01-13 PROCEDURE — 84702 CHORIONIC GONADOTROPIN TEST: CPT

## 2021-01-13 PROCEDURE — 250N000009 HC RX 250: Performed by: EMERGENCY MEDICINE

## 2021-01-13 RX ORDER — HYDROCODONE BITARTRATE AND ACETAMINOPHEN 5; 325 MG/1; MG/1
1 TABLET ORAL EVERY 6 HOURS PRN
Qty: 10 TABLET | Refills: 0 | Status: SHIPPED | OUTPATIENT
Start: 2021-01-13 | End: 2021-01-16

## 2021-01-13 RX ORDER — KETOROLAC TROMETHAMINE 15 MG/ML
15 INJECTION, SOLUTION INTRAMUSCULAR; INTRAVENOUS ONCE
Status: COMPLETED | OUTPATIENT
Start: 2021-01-13 | End: 2021-01-13

## 2021-01-13 RX ORDER — HYDROCODONE BITARTRATE AND ACETAMINOPHEN 5; 325 MG/1; MG/1
1 TABLET ORAL ONCE
Status: COMPLETED | OUTPATIENT
Start: 2021-01-13 | End: 2021-01-13

## 2021-01-13 RX ORDER — FAMOTIDINE 20 MG/1
20 TABLET, FILM COATED ORAL 2 TIMES DAILY
Qty: 28 TABLET | Refills: 0 | Status: SHIPPED | OUTPATIENT
Start: 2021-01-13 | End: 2021-01-27

## 2021-01-13 RX ADMIN — KETOROLAC TROMETHAMINE 15 MG: 15 INJECTION, SOLUTION INTRAMUSCULAR; INTRAVENOUS at 22:29

## 2021-01-13 RX ADMIN — HYDROCODONE BITARTRATE AND ACETAMINOPHEN 1 TABLET: 5; 325 TABLET ORAL at 23:09

## 2021-01-13 RX ADMIN — LIDOCAINE HYDROCHLORIDE 30 ML: 20 SOLUTION ORAL; TOPICAL at 21:54

## 2021-01-13 ASSESSMENT — MIFFLIN-ST. JEOR: SCORE: 1548.54

## 2021-01-13 ASSESSMENT — ENCOUNTER SYMPTOMS
VOMITING: 0
DIARRHEA: 0
COUGH: 1
FEVER: 0
SHORTNESS OF BREATH: 1
ABDOMINAL PAIN: 1
NAUSEA: 1
CHILLS: 1
HEADACHES: 1

## 2021-01-13 NOTE — ED AVS SNAPSHOT
[facility] Emergency Dept  [address]  [facility] [address]  Phone: [phone]  Fax: [phone]                                    Holli Thacker   MRN: [id number]    Department: [facility] Emergency Dept   Date of Visit: 1/13/2021           After Visit Summary Signature Page    I have received my discharge instructions, and my questions have been answered. I have discussed any challenges I see with this plan with the nurse or doctor.    ..........................................................................................................................................  Patient/Patient Representative Signature      ..........................................................................................................................................  Patient Representative Print Name and Relationship to Patient    ..................................................               ................................................  Date                                   Time    ..........................................................................................................................................  Reviewed by Signature/Title    ...................................................              ..............................................  Date                                               Time          22EPIC Rev 08/18

## 2021-01-14 ENCOUNTER — HEALTH MAINTENANCE LETTER (OUTPATIENT)
Age: 46
End: 2021-01-14

## 2021-01-14 NOTE — ED TRIAGE NOTES
Here for bilateral lower abdominal pain started this afternoon associated nausea, chills, coughing and sob, and headache. Took Advil about 1pm. ABCs intact.

## 2021-01-14 NOTE — ED PROVIDER NOTES
History   Chief Complaint:  Abdominal Pain    The history is provided by the patient.     Hloli Thacker is a 45 year old female who presents for evaluation of abdominal pain. She states this pain started this afternoon and is associated with nausea but she denies any vomiting or diarrhea. The pain has progressively worsened throughout the day, thus prompting presentation to the ED. She did take a dose of Advil when the pain started (around 1300 - approximately 8 hours prior to evaluation) but nothing since. She also reports experiencing chills, shortness of breath, a cough, and notes the symptoms are getting better after she was treated for bronchitis with antibiotics recently.    Per chart review, this is her third ED encounter in the last month for concerns related to lower abdominal pain and bloating. At both prior encounters, she underwent extensive work ups, including blood work, urine, and CT scans, and it was all unremarkable. She was treated for constipation at the first of these encounters and told to initiate Miralax.  Patient has since followed up with her primary care physician and had a normal ultrasound of her right upper quadrant.  She notes she had a HIDA scan yesterday which suggested decreased ejection fraction and she has follow-up with surgery as scheduled.    Allergies:  Azithromycin  Erythromycin  Verapamil    Medications:    Albuterol inhaler & neb   Advair inhaler   Singulair   Xolair  Protonix  Venlafaxine   Rinvoq  Sertraline   Lyrica     Past Medical History:    Anxiety   Arnold-Chiari malformation, type I   Depression   Fibromyalgia  Migraines   Rheumatoid arthritis  Asthma   Idiopathic urticaria   GERD  TMJ disorder  Vertigo     Past Surgical History:    Laparoscopic ovarian cystectomy, right   Left salpingo-oophorectomy       Pelvic laparoscopic   D&C  Tubal ligation    Family History:    Asthma   GI disease  Heart disease  Hypertension   Substance abuse     Social  "History:  Patient reports regular tobacco use.   Patient denies alcohol use.     Review of Systems   Constitutional: Positive for chills. Negative for fever.   Respiratory: Positive for cough and shortness of breath.    Gastrointestinal: Positive for abdominal pain and nausea. Negative for diarrhea and vomiting.   Neurological: Positive for headaches.   All other systems reviewed and are negative.      Physical Exam     Patient Vitals for the past 24 hrs:   BP Temp Temp src Pulse Resp SpO2 Height Weight   01/13/21 2230 103/57 -- -- 69 -- 97 % -- --   01/13/21 2200 98/53 -- -- 68 -- 95 % -- --   01/13/21 2150 96/58 -- -- 69 -- 97 % -- --   01/13/21 2028 (!) 157/109 97.7  F (36.5  C) Oral 80 18 98 % 1.702 m (5' 7\") 87.1 kg (192 lb)        Physical Exam  Nursing note and vitals reviewed.  HENT:   Mouth/Throat: Moist mucous membranes.   Eyes: EOMI, nonicteric sclera  Cardiovascular: Normal rate  Pulmonary/Chest: Effort normal.  Speaking in complete sentences.  Abdominal: Soft. Mild diffuse TTP, negative barth's sign, nondistended, no guarding or rigidity.   Musculoskeletal: Normal range of motion.   Neurological: Alert. Moves all extremities spontaneously.   Skin: Skin is warm and dry. No rash noted.   Psychiatric: Normal mood and affect.     Emergency Department Course     Laboratory:  CBC: WBC 7.9, HGB: 12.8, PLT: 328  CMP: Glucose: 100 (H), Cl: 112 (H), Anion gap: <1 (L), Ca: 8.2 (L), o/w WNL (Creatinine: 0.79)     Lipase:  218    ISTAT Quantitative hCG POCT: <5.0    Emergency Department Course:    Reviewed:  I reviewed the patient's nursing notes, vitals, past medical records, and Care Everywhere.     Assessments:  2117: I performed an exam of the patient, as documented above. History obtained and plan for ED work up discussed as well.   2249: I reassessed the patient and discussed the results of the work up and recommendations for home.     Interventions:  2154: GI Cocktail (Maalox/Mylanta and viscous Lidocaine), " 30 mL suspension, PO    2229: Toradol, 15 mg, IV injection  Ordered: Norco, 1 tablet, PO    Disposition:  The patient was discharged to home.     Impression & Plan      Medical Decision Making:   Holli Thacker is a 45 year old female who presents with chief complaint abdominal pain.  This is poorly localized both by history and on exam.  She has had several ED/urgent care visits for this problem without obvious etiology.  She reports that she had a HIDA scan done yesterday which suggests biliary pathology.  Pain today began immediately upon eating pineapple, and later upon eating chips.  I discussed with patient that this is not consistent with gallbladder pathology, and is more consistent with gastric pathology.  I note on previous imaging that patient has a large hiatal hernia, and I question whether this may be more responsible for her pain.  I discussed this with patient extensively.  She is already on Protonix.  We will add Pepcid.  I will also prescribe some Norco for breakthrough pain.  I encouraged her to discuss repair of her hiatal hernia with her surgeon, and I provided her with [facility], information if she would like to discuss with a different group.  With benign exam and labs unremarkable, no indication for repeat imaging today.  She is safe/stable for discharge home.  All questions answered.  Reasons to return discussed.    Diagnosis:     ICD-10-CM    1. Abdominal pain, generalized  R10.84    2. Hiatal hernia  K44.9        Discharge Medications:  New Prescriptions    FAMOTIDINE (PEPCID) 20 MG TABLET    Take 1 tablet (20 mg) by mouth 2 times daily for 14 days    HYDROCODONE-ACETAMINOPHEN (NORCO) 5-325 MG TABLET    Take 1 tablet by mouth every 6 hours as needed for severe pain      Scribe Disclosure:  I, Holly Coughlin, am serving as a scribe on 1/13/2021 at 9:17 PM to personally document services performed by Ab Garay MD based on my observations and the provider's  statements to me.      1/13/2021   EMERGENCY DEPARTMENT     Ab Garay MD  01/14/21 0400

## 2021-01-18 ENCOUNTER — OFFICE VISIT (OUTPATIENT)
Dept: SURGERY | Facility: CLINIC | Age: 46
End: 2021-01-18
Payer: COMMERCIAL

## 2021-01-18 VITALS
HEART RATE: 92 BPM | BODY MASS INDEX: 30.86 KG/M2 | SYSTOLIC BLOOD PRESSURE: 110 MMHG | WEIGHT: 192 LBS | HEIGHT: 66 IN | DIASTOLIC BLOOD PRESSURE: 70 MMHG

## 2021-01-18 DIAGNOSIS — K44.9 HIATAL HERNIA: Primary | ICD-10-CM

## 2021-01-18 PROCEDURE — 99204 OFFICE O/P NEW MOD 45 MIN: CPT | Performed by: SURGERY

## 2021-01-18 ASSESSMENT — MIFFLIN-ST. JEOR: SCORE: 1532.66

## 2021-01-19 DIAGNOSIS — K44.9 HIATAL HERNIA: Primary | ICD-10-CM

## 2021-01-19 DIAGNOSIS — E66.3 OVERWEIGHT: ICD-10-CM

## 2021-01-21 ENCOUNTER — HOSPITAL ENCOUNTER (OUTPATIENT)
Dept: GENERAL RADIOLOGY | Facility: CLINIC | Age: 46
Discharge: HOME | End: 2021-01-21
Attending: SURGERY | Admitting: SURGERY
Payer: COMMERCIAL

## 2021-01-21 ENCOUNTER — INFUSION THERAPY VISIT (OUTPATIENT)
Dept: INFUSION THERAPY | Facility: CLINIC | Age: 46
End: 2021-01-21
Attending: ALLERGY & IMMUNOLOGY
Payer: COMMERCIAL

## 2021-01-21 VITALS
OXYGEN SATURATION: 98 % | TEMPERATURE: 97.3 F | RESPIRATION RATE: 18 BRPM | HEART RATE: 78 BPM | SYSTOLIC BLOOD PRESSURE: 112 MMHG | DIASTOLIC BLOOD PRESSURE: 70 MMHG

## 2021-01-21 DIAGNOSIS — K44.9 HIATAL HERNIA: ICD-10-CM

## 2021-01-21 DIAGNOSIS — L50.1 URTICARIA, IDIOPATHIC: Primary | ICD-10-CM

## 2021-01-21 PROCEDURE — 74240 X-RAY XM UPR GI TRC 1CNTRST: CPT

## 2021-01-21 PROCEDURE — 255N000001 HC RX 255: Performed by: SURGERY

## 2021-01-21 PROCEDURE — 250N000011 HC RX IP 250 OP 636

## 2021-01-21 PROCEDURE — 96372 THER/PROPH/DIAG INJ SC/IM: CPT

## 2021-01-21 RX ADMIN — ANTACID/ANTIFLATULENT 4 G: 380; 550; 10; 10 GRANULE, EFFERVESCENT ORAL at 11:29

## 2021-01-21 RX ADMIN — OMALIZUMAB 300 MG: 150 INJECTION, SOLUTION SUBCUTANEOUS at 13:05

## 2021-01-21 NOTE — PROGRESS NOTES
Infusion Nursing Note:  Holli ROOSEVELT Thacker presents today for Xoalir.    Patient seen by provider today: No   present during visit today: Not Applicable.    Note: N/A.    Intravenous Access:  No Intravenous access/labs at this visit.    Treatment Conditions:  Not Applicable.      Post Infusion Assessment:  Patient tolerated injection without incident.  Patient observed for 30 minutes post Xolair per protocol.       Discharge Plan:   Discharge instructions reviewed with: Patient.  Patient and/or family verbalized understanding of discharge instructions and all questions answered.  AVS to patient via [employer].  Patient will return 2/11/21 for next appointment.   Patient discharged in stable condition accompanied by: self.  Departure Mode: Ambulatory.    Althea Garduno RN

## 2021-02-07 NOTE — PROGRESS NOTES
[facility] Surgery [facility] Consultation    CHIEF COMPLAINT:  Chief Complaint   Patient presents with     Consult     Hiatal hernia; Gallbladder        HISTORY OF PRESENT ILLNESS:  Holli Thackre is a 45 year old female who is seen in consultation at the request of Dr. Bal for evaluation of symptomatic hiatal hernia with intrathoracic portion of stomach.    REVIEW OF SYSTEMS:  Constitutional:  Negative for chills, fatigue, fever and weight change.  Neuro: No extremity, nor facial weakness  Psych:  No unexpected changes in mood  Eyes:  Negative for new vision problems.  ENT:  Negative for ENT pain.  Cardiovascular:  Negative for chest pain, palpitations.  Respiratory:  Negative for cough, dyspnea.  Gastrointestinal: Recent emergency room visit for abdominal pain, decreased appetite and bloating  Musculoskeletal:  Negative for new arthralgias or myalgias.  Integumentary: No new rashes or lesions    Past Medical History:   Diagnosis Date     Anxiety      Arnold-Chiari malformation, type I (H)      Depressive disorder      Fibromyalgia      Migraine      PONV (postoperative nausea and vomiting)      Rheumatoid arthritis flare (H)      Uncomplicated asthma        Past Surgical History:   Procedure Laterality Date     GYN SURGERY       LAPAROSCOPIC CYSTECTOMY OVARIAN (BENIGN) Right 3/17/2018    Procedure: LAPAROSCOPIC CYSTECTOMY OVARIAN (BENIGN);  Diagnostic laparoscopy, right ovarian cystectomy and partial salpingectomy;  Surgeon: Juice Gold MD;  Location: [facility] OR     LAPAROSCOPY DIAGNOSTIC (GYN) N/A 3/17/2018    Procedure: LAPAROSCOPY DIAGNOSTIC (GYN);;  Surgeon: Juice Gold MD;  Location: [facility] OR     OVARY SURGERY Left        Family History has been reviewed.    Social History     Tobacco Use     Smoking status: Current Every Day Smoker     Smokeless tobacco: Never Used   Substance Use Topics     Alcohol use: No     Comment: 1 per month        Patient Active Problem List   Diagnosis  "    Chiari malformation     Urticaria, idiopathic     Post-op pain     Spasm of muscle     Chronic migraine without aura, with intractable migraine, so stated, with status migrainosus     Classical migraine       Allergies   Allergen Reactions     Azithromycin      Erythromycin      Verapamil        Current Outpatient Medications   Medication Sig Dispense Refill     Azelastine HCl 137 MCG/SPRAY SOLN        Cholecalciferol (VITAMIN D3) 3000 UNITS TABS Take 5,000 Units by mouth daily       cyclobenzaprine (FLEXERIL) 10 MG tablet Take 10 mg by mouth At Bedtime        fluticasone-salmeterol (ADVAIR) 500-50 MCG/DOSE diskus inhaler Inhale 1 puff into the lungs 2 times daily       ibuprofen (ADVIL/MOTRIN) 200 MG tablet Take 1-2 tablets (200-400 mg) by mouth every 6 hours as needed for other (mild pain) 30 tablet 0     ipratropium - albuterol 0.5 mg/2.5 mg/3 mL (DUONEB) 0.5-2.5 (3) MG/3ML nebulization Take 1 vial (3 mLs) by nebulization 3 times daily 1 Box 3     Montelukast Sodium (SINGULAIR PO) Take 10 mg by mouth At Bedtime        omalizumab (XOLAIR) 150 MG injection Inject 300 mg Subcutaneous See Admin Instructions        pantoprazole (PROTONIX) 40 MG EC tablet Take 40 mg by mouth every morning        pregabalin (LYRICA) 25 MG capsule Take 25 mg by mouth       Sertraline HCl (ZOLOFT PO) Take 200 mg by mouth At Bedtime        Upadacitinib ER (RINVOQ) 15 MG TB24        Venlafaxine HCl (EFFEXOR PO) Take 37.5 mg by mouth 2 times daily       albuterol (2.5 MG/3ML) 0.083% nebulizer solution Take 1 vial (2.5 mg) by nebulization every 4 hours as needed for shortness of breath / dyspnea or wheezing 1 Box 0       Vitals: /70   Pulse 92   Ht 1.676 m (5' 6\")   Wt 87.1 kg (192 lb)   LMP  (LMP Unknown)   BMI 30.99 kg/m    BMI= Body mass index is 30.99 kg/m .    EXAM:  GENERAL: Mildly obese, alert and no distress     HEENT: moist mucus membranes, no scleral icterus,   CARDIOVASCULAR:  RRR, No JVD  NEURO:  Alert;  well " oriented to time, place and person.  RESPIRATORY: non labored breathing  NECK: Neck supple. No noticeable masses.  No lymphadenopathy noted.  ABDOMEN/GI: soft, nontender, nondistended,   EXTREMITIES: warm and well perfused, no edema  SKIN: No suspicious lesions or rashes    LABS/Imaging: CT scan of the abdomen, and laboratories from recent ED visit reviewed    ASSESSMENT:  Holli Thacker suffers from 1. Hiatal hernia    PLAN:  Esophagogram  Weight loss preoperatively  Laparoscopic hiatal hernia repair with mesh possible cholecystectomy.    Holli Thacker understands the risk, benefits, hopeful outcomes, and possible complications, both in the short and in the long term.  All her questions answered, she will like to proceed with the propose procedure in the near future.    It is my pleasure to participate in the care of Holli Thacker. Thank you for this consultation.     If you have any questions please give me a call.    Best regards,  Frank Weeks MD    Please route or send letter to:  Primary Care Provider (PCP), Referring Provider and Include Progress Note    Total time with patient visit: 35 minutes more than half spent in counseling, explanation of procedures and coordination of care.

## 2021-02-11 ENCOUNTER — INFUSION THERAPY VISIT (OUTPATIENT)
Dept: INFUSION THERAPY | Facility: CLINIC | Age: 46
End: 2021-02-11
Attending: ALLERGY & IMMUNOLOGY
Payer: COMMERCIAL

## 2021-02-11 DIAGNOSIS — L50.1 URTICARIA, IDIOPATHIC: Primary | ICD-10-CM

## 2021-02-11 NOTE — PROGRESS NOTES
Usha arrived today for Xolair.  In reviewing Biological checklist, she responded that she had an endoscopy 2 days ago and has an esophageal yeast infection, she has not started her antiobiotics yet.    RN  Contacted Dr Dunlap's office at [facility].   transferred call to her voicemail.  This RN left message, but no call returned to us before  pt needed to leave for another appt. She will attempt to contact allergy clinic herself and return here tomorrow for Xolair if approved.    Dr Dunlap's RN returned call and indicated that pt may have Xolair even with infection and antibiotic.Pt will return 2/12.

## 2021-02-12 ENCOUNTER — INFUSION THERAPY VISIT (OUTPATIENT)
Dept: INFUSION THERAPY | Facility: CLINIC | Age: 46
End: 2021-02-12
Attending: ALLERGY & IMMUNOLOGY
Payer: COMMERCIAL

## 2021-02-12 VITALS
HEART RATE: 91 BPM | RESPIRATION RATE: 16 BRPM | SYSTOLIC BLOOD PRESSURE: 137 MMHG | OXYGEN SATURATION: 98 % | DIASTOLIC BLOOD PRESSURE: 80 MMHG | TEMPERATURE: 98.9 F

## 2021-02-12 DIAGNOSIS — L50.1 URTICARIA, IDIOPATHIC: Primary | ICD-10-CM

## 2021-02-12 PROCEDURE — 250N000011 HC RX IP 250 OP 636

## 2021-02-12 PROCEDURE — 96372 THER/PROPH/DIAG INJ SC/IM: CPT

## 2021-02-12 RX ADMIN — OMALIZUMAB 300 MG: 150 INJECTION, SOLUTION SUBCUTANEOUS at 13:06

## 2021-02-12 NOTE — PROGRESS NOTES
Infusion Nursing Note:  Holli ALBERT Garrettgris Thacker presents today for Xolair.    Patient seen by provider today: No   present during visit today: Not Applicable.    Note: Her for xolair today. Pt. Did not get it yesterday. See note from 2/11/2021. Okay to give xolair even though pt has a esophageal yeast infection.  Patient did meet criteria for an asymptomatic covid-19 PCR test in infusion today. Patient declined the covid-19 test.    Intravenous Access:  No Intravenous access/labs at this visit.    Treatment Conditions:  Not Applicable.      Post Infusion Assessment:  Patient tolerated injection without incident.  Patient observed for 30 minutes post xolair per protocol.       Discharge Plan:   Discharge instructions reviewed with: Patient.  Patient and/or family verbalized understanding of discharge instructions and all questions answered.  AVS to patient via [employer].  Patient will return 3/4/2021 for next appointment.   Patient discharged in stable condition accompanied by: self.  Departure Mode: Ambulatory.    Cheryl Billings RN

## 2021-03-04 ENCOUNTER — INFUSION THERAPY VISIT (OUTPATIENT)
Dept: INFUSION THERAPY | Facility: CLINIC | Age: 46
End: 2021-03-04
Attending: ALLERGY & IMMUNOLOGY
Payer: COMMERCIAL

## 2021-03-04 VITALS
HEART RATE: 85 BPM | OXYGEN SATURATION: 97 % | TEMPERATURE: 98 F | DIASTOLIC BLOOD PRESSURE: 81 MMHG | SYSTOLIC BLOOD PRESSURE: 133 MMHG

## 2021-03-04 DIAGNOSIS — L50.1 URTICARIA, IDIOPATHIC: Primary | ICD-10-CM

## 2021-03-04 PROCEDURE — 96372 THER/PROPH/DIAG INJ SC/IM: CPT

## 2021-03-04 PROCEDURE — 250N000011 HC RX IP 250 OP 636

## 2021-03-04 RX ADMIN — OMALIZUMAB 300 MG: 150 INJECTION, SOLUTION SUBCUTANEOUS at 12:10

## 2021-03-04 NOTE — PROGRESS NOTES
Infusion Nursing Note:  Holli Thacker presents today for Xolair.    Patient seen by provider today: No   present during visit today: Not Applicable.    Note: N/A.      Intravenous Access:  No Intravenous access/labs at this visit.    Treatment Conditions:  Not Applicable.      Post Infusion Assessment:  Patient tolerated injection without incident.  Patient observed for 30 minutes post Xolair per protocol.  Site patent and intact, free from redness, edema or discomfort.       Discharge Plan:   Discharge instructions reviewed with: Patient.  Patient and/or family verbalized understanding of discharge instructions and all questions answered.  AVS to patient via WhatsOpenT.  Patient will return 3/25/21 for next Xolair injection for next appointment.   Patient discharged in stable condition accompanied by: self.    Mercedes Lane RN

## 2021-03-05 ENCOUNTER — MEDICAL CORRESPONDENCE (OUTPATIENT)
Dept: HEALTH INFORMATION MANAGEMENT | Facility: CLINIC | Age: 46
End: 2021-03-05

## 2021-03-05 RX ORDER — HEPARIN SODIUM,PORCINE 10 UNIT/ML
5 VIAL (ML) INTRAVENOUS
Status: CANCELLED | OUTPATIENT
Start: 2021-03-06

## 2021-03-05 RX ORDER — KETOROLAC TROMETHAMINE 30 MG/ML
30 INJECTION, SOLUTION INTRAMUSCULAR; INTRAVENOUS ONCE
Status: CANCELLED
Start: 2021-03-06 | End: 2021-03-06

## 2021-03-05 RX ORDER — HEPARIN SODIUM (PORCINE) LOCK FLUSH IV SOLN 100 UNIT/ML 100 UNIT/ML
5 SOLUTION INTRAVENOUS
Status: CANCELLED | OUTPATIENT
Start: 2021-03-06

## 2021-03-05 RX ORDER — LORAZEPAM 2 MG/ML
1 INJECTION INTRAMUSCULAR
Status: CANCELLED
Start: 2021-03-06 | End: 2021-03-08

## 2021-03-05 RX ORDER — METHYLPREDNISOLONE SODIUM SUCCINATE 125 MG/2ML
100 INJECTION, POWDER, LYOPHILIZED, FOR SOLUTION INTRAMUSCULAR; INTRAVENOUS ONCE
Status: CANCELLED
Start: 2021-03-06 | End: 2021-03-06

## 2021-03-06 ENCOUNTER — INFUSION THERAPY VISIT (OUTPATIENT)
Dept: INFUSION THERAPY | Facility: CLINIC | Age: 46
End: 2021-03-06
Attending: PSYCHIATRY & NEUROLOGY
Payer: COMMERCIAL

## 2021-03-06 VITALS
OXYGEN SATURATION: 96 % | TEMPERATURE: 98.3 F | RESPIRATION RATE: 16 BRPM | HEART RATE: 78 BPM | SYSTOLIC BLOOD PRESSURE: 92 MMHG | DIASTOLIC BLOOD PRESSURE: 53 MMHG

## 2021-03-06 DIAGNOSIS — G43.111 INTRACTABLE MIGRAINE WITH AURA WITH STATUS MIGRAINOSUS: ICD-10-CM

## 2021-03-06 DIAGNOSIS — G43.711 CHRONIC MIGRAINE WITHOUT AURA, WITH INTRACTABLE MIGRAINE, SO STATED, WITH STATUS MIGRAINOSUS: Primary | ICD-10-CM

## 2021-03-06 PROCEDURE — 250N000009 HC RX 250: Performed by: PSYCHIATRY & NEUROLOGY

## 2021-03-06 PROCEDURE — 96374 THER/PROPH/DIAG INJ IV PUSH: CPT

## 2021-03-06 PROCEDURE — 258N000003 HC RX IP 258 OP 636: Performed by: PSYCHIATRY & NEUROLOGY

## 2021-03-06 PROCEDURE — 250N000011 HC RX IP 250 OP 636: Performed by: PSYCHIATRY & NEUROLOGY

## 2021-03-06 PROCEDURE — 96375 TX/PRO/DX INJ NEW DRUG ADDON: CPT

## 2021-03-06 RX ORDER — HEPARIN SODIUM,PORCINE 10 UNIT/ML
5 VIAL (ML) INTRAVENOUS
Status: CANCELLED | OUTPATIENT
Start: 2021-03-06

## 2021-03-06 RX ORDER — KETOROLAC TROMETHAMINE 30 MG/ML
30 INJECTION, SOLUTION INTRAMUSCULAR; INTRAVENOUS ONCE
Status: COMPLETED | OUTPATIENT
Start: 2021-03-06 | End: 2021-03-06

## 2021-03-06 RX ORDER — KETOROLAC TROMETHAMINE 30 MG/ML
30 INJECTION, SOLUTION INTRAMUSCULAR; INTRAVENOUS ONCE
Status: CANCELLED
Start: 2021-03-06 | End: 2021-03-06

## 2021-03-06 RX ORDER — METHYLPREDNISOLONE SODIUM SUCCINATE 125 MG/2ML
100 INJECTION, POWDER, LYOPHILIZED, FOR SOLUTION INTRAMUSCULAR; INTRAVENOUS ONCE
Status: CANCELLED
Start: 2021-03-06 | End: 2021-03-06

## 2021-03-06 RX ORDER — LORAZEPAM 2 MG/ML
1 INJECTION INTRAMUSCULAR
Status: DISCONTINUED | OUTPATIENT
Start: 2021-03-06 | End: 2021-03-06

## 2021-03-06 RX ORDER — METHYLPREDNISOLONE SODIUM SUCCINATE 125 MG/2ML
100 INJECTION, POWDER, LYOPHILIZED, FOR SOLUTION INTRAMUSCULAR; INTRAVENOUS ONCE
Status: COMPLETED | OUTPATIENT
Start: 2021-03-06 | End: 2021-03-06

## 2021-03-06 RX ORDER — HEPARIN SODIUM (PORCINE) LOCK FLUSH IV SOLN 100 UNIT/ML 100 UNIT/ML
5 SOLUTION INTRAVENOUS
Status: CANCELLED | OUTPATIENT
Start: 2021-03-06

## 2021-03-06 RX ORDER — LORAZEPAM 2 MG/ML
1 INJECTION INTRAMUSCULAR
Status: CANCELLED
Start: 2021-03-06 | End: 2021-03-08

## 2021-03-06 RX ADMIN — SODIUM CHLORIDE 1000 ML: 9 INJECTION, SOLUTION INTRAVENOUS at 08:44

## 2021-03-06 RX ADMIN — KETOROLAC TROMETHAMINE 30 MG: 30 INJECTION, SOLUTION INTRAMUSCULAR; INTRAVENOUS at 08:54

## 2021-03-06 RX ADMIN — LORAZEPAM 1 MG: 2 INJECTION INTRAMUSCULAR; INTRAVENOUS at 09:05

## 2021-03-06 RX ADMIN — ONDANSETRON 4 MG: 2 INJECTION INTRAMUSCULAR; INTRAVENOUS at 08:57

## 2021-03-06 RX ADMIN — METHYLPREDNISOLONE SODIUM SUCCINATE 100 MG: 125 INJECTION, POWDER, FOR SOLUTION INTRAMUSCULAR; INTRAVENOUS at 09:04

## 2021-03-06 RX ADMIN — SODIUM CHLORIDE 1000 MG: 9 INJECTION, SOLUTION INTRAVENOUS at 09:34

## 2021-03-06 ASSESSMENT — PAIN SCALES - GENERAL: PAINLEVEL: SEVERE PAIN (6)

## 2021-03-06 NOTE — PROGRESS NOTES
Infusion Nursing Note:  Holli Thacker presents today for IVF, Solumedrol, Depakane, Toradol, Zofran    Patient seen by provider today: No   present during visit today: Not Applicable.    Note:  Patient did meet criteria for an asymptomatic covid-19 PCR test in infusion today. Patient declined the covid-19 test.    Intravenous Access:  Peripheral IV placed.    Treatment Conditions:  Not Applicable.      Post Infusion Assessment:  Patient tolerated infusion without incident.  Blood return noted pre and post infusion.  Site patent and intact, free from redness, edema or discomfort.  No evidence of extravasations.  Access discontinued per protocol.       Discharge Plan:   Discharge instructions reviewed with: Patient.  Patient and/or family verbalized understanding of discharge instructions and all questions answered.  Patient discharged in stable condition accompanied by: self and . Writer walked pt to car.  Departure Mode: Ambulatory.    Sheila Mukherjee RN

## 2021-03-13 ENCOUNTER — HEALTH MAINTENANCE LETTER (OUTPATIENT)
Age: 46
End: 2021-03-13

## 2021-03-17 ENCOUNTER — HOSPITAL ENCOUNTER (EMERGENCY)
Facility: CLINIC | Age: 46
Discharge: HOME | End: 2021-03-17
Attending: EMERGENCY MEDICINE | Admitting: EMERGENCY MEDICINE
Payer: COMMERCIAL

## 2021-03-17 ENCOUNTER — APPOINTMENT (OUTPATIENT)
Dept: CT IMAGING | Facility: CLINIC | Age: 46
End: 2021-03-17
Attending: EMERGENCY MEDICINE
Payer: COMMERCIAL

## 2021-03-17 VITALS
HEART RATE: 73 BPM | WEIGHT: 192 LBS | SYSTOLIC BLOOD PRESSURE: 127 MMHG | RESPIRATION RATE: 16 BRPM | TEMPERATURE: 97.8 F | OXYGEN SATURATION: 96 % | BODY MASS INDEX: 30.99 KG/M2 | DIASTOLIC BLOOD PRESSURE: 78 MMHG

## 2021-03-17 DIAGNOSIS — M54.6 ACUTE LEFT-SIDED THORACIC BACK PAIN: ICD-10-CM

## 2021-03-17 DIAGNOSIS — R91.8 PULMONARY NODULES: ICD-10-CM

## 2021-03-17 LAB
ALBUMIN SERPL-MCNC: 3.7 G/DL (ref 3.4–5)
ALP SERPL-CCNC: 122 U/L (ref 40–150)
ALT SERPL W P-5'-P-CCNC: 30 U/L (ref 0–50)
ANION GAP SERPL CALCULATED.3IONS-SCNC: 6 MMOL/L (ref 3–14)
AST SERPL W P-5'-P-CCNC: 27 U/L (ref 0–45)
BASOPHILS # BLD AUTO: 0.1 10E9/L (ref 0–0.2)
BASOPHILS NFR BLD AUTO: 0.9 %
BILIRUB SERPL-MCNC: 0.3 MG/DL (ref 0.2–1.3)
BUN SERPL-MCNC: 11 MG/DL (ref 7–30)
CALCIUM SERPL-MCNC: 9 MG/DL (ref 8.5–10.1)
CHLORIDE SERPL-SCNC: 108 MMOL/L (ref 94–109)
CO2 SERPL-SCNC: 27 MMOL/L (ref 20–32)
CREAT SERPL-MCNC: 0.93 MG/DL (ref 0.52–1.04)
D DIMER PPP FEU-MCNC: 0.9 UG/ML FEU (ref 0–0.5)
DIFFERENTIAL METHOD BLD: ABNORMAL
EOSINOPHIL # BLD AUTO: 0.1 10E9/L (ref 0–0.7)
EOSINOPHIL NFR BLD AUTO: 1.8 %
ERYTHROCYTE [DISTWIDTH] IN BLOOD BY AUTOMATED COUNT: 13.8 % (ref 10–15)
GFR SERPL CREATININE-BSD FRML MDRD: 74 ML/MIN/{1.73_M2}
GLUCOSE SERPL-MCNC: 95 MG/DL (ref 70–99)
HCT VFR BLD AUTO: 40.5 % (ref 35–47)
HGB BLD-MCNC: 13.1 G/DL (ref 11.7–15.7)
IMM GRANULOCYTES # BLD: 0 10E9/L (ref 0–0.4)
IMM GRANULOCYTES NFR BLD: 0.2 %
INTERPRETATION ECG - MUSE: NORMAL
LIPASE SERPL-CCNC: 132 U/L (ref 73–393)
LYMPHOCYTES # BLD AUTO: 2.2 10E9/L (ref 0.8–5.3)
LYMPHOCYTES NFR BLD AUTO: 40.1 %
MCH RBC QN AUTO: 32.6 PG (ref 26.5–33)
MCHC RBC AUTO-ENTMCNC: 32.3 G/DL (ref 31.5–36.5)
MCV RBC AUTO: 101 FL (ref 78–100)
MONOCYTES # BLD AUTO: 0.6 10E9/L (ref 0–1.3)
MONOCYTES NFR BLD AUTO: 9.9 %
NEUTROPHILS # BLD AUTO: 2.6 10E9/L (ref 1.6–8.3)
NEUTROPHILS NFR BLD AUTO: 47.1 %
NRBC # BLD AUTO: 0 10*3/UL
NRBC BLD AUTO-RTO: 0 /100
PLATELET # BLD AUTO: 357 10E9/L (ref 150–450)
POTASSIUM SERPL-SCNC: 4.2 MMOL/L (ref 3.4–5.3)
PROT SERPL-MCNC: 7.2 G/DL (ref 6.8–8.8)
RBC # BLD AUTO: 4.02 10E12/L (ref 3.8–5.2)
SODIUM SERPL-SCNC: 141 MMOL/L (ref 133–144)
TROPONIN I SERPL-MCNC: <0.015 UG/L (ref 0–0.04)
WBC # BLD AUTO: 5.5 10E9/L (ref 4–11)

## 2021-03-17 PROCEDURE — 250N000009 HC RX 250: Performed by: EMERGENCY MEDICINE

## 2021-03-17 PROCEDURE — 99285 EMERGENCY DEPT VISIT HI MDM: CPT | Mod: 25

## 2021-03-17 PROCEDURE — 250N000013 HC RX MED GY IP 250 OP 250 PS 637: Performed by: EMERGENCY MEDICINE

## 2021-03-17 PROCEDURE — 250N000011 HC RX IP 250 OP 636: Performed by: EMERGENCY MEDICINE

## 2021-03-17 PROCEDURE — 71275 CT ANGIOGRAPHY CHEST: CPT

## 2021-03-17 PROCEDURE — 85025 COMPLETE CBC W/AUTO DIFF WBC: CPT | Performed by: EMERGENCY MEDICINE

## 2021-03-17 PROCEDURE — 83690 ASSAY OF LIPASE: CPT | Performed by: EMERGENCY MEDICINE

## 2021-03-17 PROCEDURE — 80053 COMPREHEN METABOLIC PANEL: CPT | Performed by: EMERGENCY MEDICINE

## 2021-03-17 PROCEDURE — 84484 ASSAY OF TROPONIN QUANT: CPT | Performed by: EMERGENCY MEDICINE

## 2021-03-17 PROCEDURE — 93005 ELECTROCARDIOGRAM TRACING: CPT

## 2021-03-17 PROCEDURE — 85379 FIBRIN DEGRADATION QUANT: CPT | Performed by: EMERGENCY MEDICINE

## 2021-03-17 RX ORDER — IBUPROFEN 600 MG/1
600 TABLET, FILM COATED ORAL ONCE
Status: COMPLETED | OUTPATIENT
Start: 2021-03-17 | End: 2021-03-17

## 2021-03-17 RX ORDER — ACETAMINOPHEN 325 MG/1
975 TABLET ORAL ONCE
Status: COMPLETED | OUTPATIENT
Start: 2021-03-17 | End: 2021-03-17

## 2021-03-17 RX ORDER — LIDOCAINE 4 G/G
1 PATCH TOPICAL ONCE
Status: DISCONTINUED | OUTPATIENT
Start: 2021-03-17 | End: 2021-03-17 | Stop reason: HOSPADM

## 2021-03-17 RX ORDER — IOPAMIDOL 755 MG/ML
500 INJECTION, SOLUTION INTRAVASCULAR ONCE
Status: COMPLETED | OUTPATIENT
Start: 2021-03-17 | End: 2021-03-17

## 2021-03-17 RX ORDER — OXYCODONE HYDROCHLORIDE 5 MG/1
10 TABLET ORAL ONCE
Status: COMPLETED | OUTPATIENT
Start: 2021-03-17 | End: 2021-03-17

## 2021-03-17 RX ORDER — LIDOCAINE 50 MG/G
1 PATCH TOPICAL EVERY 24 HOURS
Qty: 7 PATCH | Refills: 0 | Status: SHIPPED | OUTPATIENT
Start: 2021-03-17 | End: 2021-03-24

## 2021-03-17 RX ADMIN — OXYCODONE HYDROCHLORIDE 10 MG: 5 TABLET ORAL at 14:30

## 2021-03-17 RX ADMIN — SODIUM CHLORIDE 94 ML: 9 INJECTION, SOLUTION INTRAVENOUS at 16:22

## 2021-03-17 RX ADMIN — ACETAMINOPHEN 975 MG: 325 TABLET, FILM COATED ORAL at 12:19

## 2021-03-17 RX ADMIN — LIDOCAINE 1 PATCH: 560 PATCH PERCUTANEOUS; TOPICAL; TRANSDERMAL at 14:29

## 2021-03-17 RX ADMIN — IBUPROFEN 600 MG: 600 TABLET, FILM COATED ORAL at 12:19

## 2021-03-17 RX ADMIN — IOPAMIDOL 76 ML: 755 INJECTION, SOLUTION INTRAVENOUS at 16:22

## 2021-03-17 ASSESSMENT — ENCOUNTER SYMPTOMS
FEVER: 0
NUMBNESS: 1
BACK PAIN: 1
ABDOMINAL PAIN: 1
VOMITING: 0

## 2021-03-17 NOTE — ED PROVIDER NOTES
History   Chief Complaint:  Back Pain       HPI   Holli Thacker is a 45 year old female with history of fibromyalgia and rheumatoid arthritis who presents with back pain. The patient states that she was just putting the coffee cup on the counter and felt her mid back tighten up around 0730 this morning. She states that the back pain radiates to her left arm and has stayed constant throughout the day. She states that the back pain is a sharp pain and feels different from her usual back pain that she has had intermittently for months. She has tried taking Flexeril and ibuprofen at home with minimal relief. She also notes having left leg swelling and left leg pain for the past few days. She states that she usually gets adjusted by her chiropractor once a week. She notes having numbness in her left hand, chest pain when she breathes, and epigastric abdominal pain as well. She denies any fever or vomiting. She denies a chance for pregnancy.     Review of Systems   Constitutional: Negative for fever.   Cardiovascular: Positive for chest pain and leg swelling (left).   Gastrointestinal: Positive for abdominal pain (epigastric). Negative for vomiting.   Musculoskeletal: Positive for back pain (mid).        Left leg pain   Neurological: Positive for numbness (left hand).   All other systems reviewed and are negative.         Allergies:  Azithromycin  Erythromycin  Verapamil    Medications:  cyclobenzaprine   Montelukast Sodium  omalizumab   pregabalin  Sertraline HCl  Upadacitinib ER  Venlafaxine HCl     Past Medical History:    Anxiety   Arnold-Chiari malformation, type I   Depressive disorder   Fibromyalgia   Migraine   PONV (postoperative nausea and vomiting)   Rheumatoid arthritis flare   Uncomplicated asthma     Past Surgical History:    Cystectomy ovarian   Ovary surgery      D&C  Tubal ligation   Salpingo-oophorectomy   Dental surgery     Family History:    Asthma   GI disease   Alcohol/drug    Hypertension   Heart disease     Social History:  The patient presents with her .   The patient is .     Physical Exam     Patient Vitals for the past 24 hrs:   BP Temp Temp src Pulse Resp SpO2 Weight   03/17/21 1500 127/78 -- -- 73 18 96 % --   03/17/21 1213 116/73 97.8  F (36.6  C) Temporal 78 18 100 % 87.1 kg (192 lb)       Physical Exam  Gen: well appearing, in no acute distress  HENT:  mmm, no rhinorrhea  Eyes: periorbital tissues and sclera normal   Neck: supple, no abnormal swelling  Lungs:  CTAB,  no resp distress  CV: rrr, no m/r/g, ppi  Abd: soft, nontender, nondistended, no rebound/masses/guarding/hsm  Ext: no peripheral edema.  Reproducible ttp inferior to L scapula and paraspinous muscles.  No overlying erythema or rash. Shoulder ROM intact.    Skin: warm, dry, well perfused, no rashes/bruising/lesions on exposed skin  Neuro: alert, CN intact, MAEE, motor 5/5 BUE and BLE, SILT all ext, coordination intact, gait stable  Psych: Normal mood, normal affect      Emergency Department Course     ECG:  ECG taken at 1402, ECG read at 1405  Normal sinus rhythm  Normal ECG  Rate 71 bpm. TX interval 140 ms. QRS duration 82 ms. QT/QTc 412/447 ms. P-R-T axes 10 19 34.     Imaging:  CT Chest Pulmonary Embolism w Contrast  Preliminary Result  IMPRESSION:  1.  No evidence for pulmonary embolism or acute thoracic aortic  abnormality. No evidence for pneumonia.  2.  Mild pulmonary edema at the lower lungs suggested.  3.  Stable moderate hiatal hernia.  4.  Incidental small pulmonary nodule on the left, see below for  follow-up imaging guidelines.  Recommendations for one or multiple incidental lung nodules < 6mm :    Low risk patients: No routine follow-up.    High risk patients: Optional follow-up CT at 12 months; if  unchanged, no further follow-up.  *Low Risk: Minimal or absent history of smoking or other known risk  factors.  *Nonsolid (ground glass) or partly solid nodules may require  longer  follow-up to exclude indolent adenocarcinoma.  *Recommendations based on Guidelines for the Management of Incidental  Pulmonary Nodules Detected at CT: From the Fleischner Society 2017,  Radiology 2017.  Reading per radiology       Laboratory:  CBC:  WBC 5.5, HGB 13.1, , o/w WNL      CMP: AWNL (Creatinine 0.93)     Lipase: 132      Troponin(1418):  <0.015      D dimer quantitative: 0.9 (H)    Emergency Department Course:    Reviewed:  I reviewed the patient's nursing notes, vitals, past medical records, Care Everywhere.     Assessments:  1353  I performed an exam of the patient as documented above.   1705 Patient rechecked and updated.     Interventions:  1219 Tylenol 975 mg oral   1219 Ibuprofen 600 mg oral   1429 lidocare 1 patch transdermal   1430 Oxycodone 10 mg oral     Disposition:  Discharged to home.      Impression & Plan     Medical Decision Makin y F here with atraumatic reproducible L thoracic back pain.  Etiology unclear, possible msk.  No e/o of occlusive coronary process/myocardiitis/pericarditis (ecg, trop), PE/Thoraicic dissection (ddimer -> CT), Ptx, pna, etc.  No e/o shingles or cellulitis on exam. Neuro exam without focal deficit suggesting CNS neurosurgical emergency.  Abdomen is benign.  Sx improved with above interventions. Given w/u, ANKUSH hernandez she is stable for dc home and sx care.  She understands and is in agreement with plan.  We discussed what is known and unknown at this point as well as when to f/u.          Diagnosis:    ICD-10-CM    1. Acute left-sided thoracic back pain  M54.6        Discharge Medications:  New Prescriptions    LIDOCAINE (LIDODERM) 5 % PATCH    Place 1 patch onto the skin every 24 hours for 7 days To prevent lidocaine toxicity, patient should be patch free for 12 hrs daily.       Scribe Disclosure:  Santiago LECHUGA, am serving as a scribe at 1:53 PM on 3/17/2021 to document services personally performed by Lawrence Smith MD based on my  observations and the provider's statements to me.        Lawrence Smith MD  03/17/21 5875

## 2021-03-17 NOTE — ED TRIAGE NOTES
"Patient reports \"I threw out my back this morning while setting down a cup of coffee\". Pain is mid-back, radiates to left.  "

## 2021-03-19 NOTE — TELEPHONE ENCOUNTER
Action    Action Taken 3/19/21:    -Imaging from PN resolved to PACS  2:56 PM         RECORDS STATUS - ALL OTHER DIAGNOSIS      RECORDS RECEIVED FROM: John Wyatt Mayo   DATE RECEIVED:    NOTES STATUS DETAILS   OFFICE NOTE from referring provider Lawrence Zeng MD   OFFICE NOTE from medical oncologist     DISCHARGE SUMMARY from hospital     DISCHARGE REPORT from the ER [facility] 3/17/21, 5/6/20   Sleep Study Requested 3/19 - PN 1/8/21   Overnight Oximetry FAXED TO HIM 3/22 8/20/20   OPERATIVE REPORT JOAN Lopez 2/9/21: Colonoscopy   MEDICATION LIST [facility] 3/17/21   CLINICAL TRIAL TREATMENTS TO DATE     LABS     PATHOLOGY REPORTS     ANYTHING RELATED TO DIAGNOSIS Epic 3/17/21   GENONOMIC TESTING     TYPE:     IMAGING (NEED IMAGES & REPORT)     XR Chest PACS 1/7/21: PN   XR Esophagram PACS 1/21/21: Epic   CT SCANS PACS Epic:  3/17/21    PN:  1/7/21   MRI     MAMMO     ULTRASOUND     PET

## 2021-03-24 ENCOUNTER — VIRTUAL VISIT (OUTPATIENT)
Dept: PULMONOLOGY | Facility: CLINIC | Age: 46
End: 2021-03-24
Attending: EMERGENCY MEDICINE
Payer: COMMERCIAL

## 2021-03-24 ENCOUNTER — PRE VISIT (OUTPATIENT)
Dept: PULMONOLOGY | Facility: CLINIC | Age: 46
End: 2021-03-24

## 2021-03-24 DIAGNOSIS — R91.8 PULMONARY NODULES: ICD-10-CM

## 2021-03-24 PROCEDURE — 999N001193 HC VIDEO/TELEPHONE VISIT; NO CHARGE

## 2021-03-24 PROCEDURE — 99204 OFFICE O/P NEW MOD 45 MIN: CPT | Mod: GT | Performed by: INTERNAL MEDICINE

## 2021-03-24 RX ORDER — PANTOPRAZOLE SODIUM 40 MG/1
TABLET, DELAYED RELEASE ORAL
COMMUNITY
Start: 2021-02-09

## 2021-03-24 RX ORDER — TIOTROPIUM BROMIDE INHALATION SPRAY 1.56 UG/1
SPRAY, METERED RESPIRATORY (INHALATION)
COMMUNITY
Start: 2021-03-17

## 2021-03-24 RX ORDER — MONTELUKAST SODIUM 10 MG/1
TABLET ORAL
COMMUNITY
Start: 2021-02-03

## 2021-03-24 RX ORDER — CETIRIZINE HYDROCHLORIDE 10 MG/1
20 TABLET ORAL
COMMUNITY

## 2021-03-24 NOTE — PROGRESS NOTES
LUNG NODULE & INTERVENTIONAL PULMONARY CLINIC  [facility]     Holli Thacker MRN# [id number]   Age: 45 year old YOB: 1975     Reason for Consultation: lung nodule(s)    Requesting Physician: Lawrence Smith MD  [facility]  [address]  [place] [address]       Assessment and Plan:    1. Established solitary pulmonary lung nodule(s). Given the characteristics on current/previous imaging and risk factors; I would classify this to be Low (<6%) risk for cancer.   -stable for 4 years.  Does not require further follow up.    2. She will qualify for lung cancer screening at 50.    3. Mild asthma, on appropriate regimen.    4. Counseled to quit smoking.    Avtar Moralez MD         History:   45F with history of asthma, here for pulmonary nodule.    She has asthma that is reasonable controlled on her medications.    She was diagnosed with an incidental pulmonary nodule on a recent CT PE protocol and was referred here.  She denies infection symptoms, hemoptysis or other issues.  She reports having a pulmonary nodule on a CT in the past.    CT chest reviewed and interpreted by me: small medial RUL nodule.    Outside records requested and reviewed from [facility].  Previous CT that was not available before was requested and reviewed.  Nodule was present in 2017.         Past Medical History:      Past Medical History:   Diagnosis Date     Anxiety      Arnold-Chiari malformation, type I (H)      Depressive disorder      Fibromyalgia      Migraine      PONV (postoperative nausea and vomiting)      Rheumatoid arthritis flare (H)      Uncomplicated asthma            Past Surgical History:      Past Surgical History:   Procedure Laterality Date     GYN SURGERY       LAPAROSCOPIC CYSTECTOMY OVARIAN (BENIGN) Right 3/17/2018    Procedure: LAPAROSCOPIC CYSTECTOMY OVARIAN (BENIGN);  Diagnostic laparoscopy, right  ovarian cystectomy and partial salpingectomy;  Surgeon: Juice Gold MD;  Location: [facility] OR     LAPAROSCOPY DIAGNOSTIC (GYN) N/A 3/17/2018    Procedure: LAPAROSCOPY DIAGNOSTIC (GYN);;  Surgeon: Juice Gold MD;  Location: [facility] OR     OVARY SURGERY Left           Social History:     Social History     Tobacco Use     Smoking status: Current Every Day Smoker     Smokeless tobacco: Never Used   Substance Use Topics     Alcohol use: No     Comment: 1 per month           Family History:   No family history on file.        Allergies:      Allergies   Allergen Reactions     Azithromycin      Erythromycin      Sertraline Other (See Comments)     Generic does not work for her  Generic does not work for her  Generic does not work for her  Generic does not work for her       Verapamil           Medications:     Current Outpatient Medications   Medication Sig     amoxicillin-clavulanate (AUGMENTIN) 875-125 MG tablet      cetirizine (ZYRTEC) 10 MG tablet Take 20 mg by mouth     Cholecalciferol (VITAMIN D3) 3000 UNITS TABS Take 5,000 Units by mouth daily     cyclobenzaprine (FLEXERIL) 10 MG tablet Take 10 mg by mouth At Bedtime      fluticasone-salmeterol (ADVAIR) 500-50 MCG/DOSE diskus inhaler Inhale 1 puff into the lungs 2 times daily     ipratropium - albuterol 0.5 mg/2.5 mg/3 mL (DUONEB) 0.5-2.5 (3) MG/3ML nebulization Take 1 vial (3 mLs) by nebulization 3 times daily     montelukast (SINGULAIR) 10 MG tablet      Montelukast Sodium (SINGULAIR PO) Take 10 mg by mouth At Bedtime      omalizumab (XOLAIR) 150 MG injection Inject 300 mg Subcutaneous See Admin Instructions      pantoprazole (PROTONIX) 40 MG EC tablet      pregabalin (LYRICA) 25 MG capsule Take 25 mg by mouth     SPIRIVA RESPIMAT 1.25 MCG/ACT inhaler      Upadacitinib ER (RINVOQ) 15 MG TB24      Venlafaxine HCl (EFFEXOR PO) Take 37.5 mg by mouth 2 times daily     vitamin B-12 (CYANOCOBALAMIN) 100 MCG tablet Take 50 mcg by mouth     ibuprofen  (ADVIL/MOTRIN) 200 MG tablet Take 1-2 tablets (200-400 mg) by mouth every 6 hours as needed for other (mild pain) (Patient not taking: Reported on 3/6/2021)     lidocaine (LIDODERM) 5 % patch Place 1 patch onto the skin every 24 hours for 7 days To prevent lidocaine toxicity, patient should be patch free for 12 hrs daily. (Patient not taking: Reported on 3/24/2021)     No current facility-administered medications for this visit.           Review of Systems:     See hpi         Physical Exam:     Constitutional - looks well, in no apparent distress  Eyes - no redness or discharge  Respiratory -breathing appears comfortable. No wheeze or rhonchi.   Cardiac -- Normal rate, rhythm.   Skin - No appreciable discoloration or lesions (very limited exam)  Neurological - No apparent tremors. Speech fluent and articlate  Psychiatric - no signs of delirium or anxiety     Exam limited to that easily identified on a virtual visit. The rest of a comprehensive physical examination is deferred due to PHE (public health emergency) video visit restrictions.

## 2021-03-24 NOTE — PROGRESS NOTES
"Usha is a 45 year old who is being evaluated via a billable video visit.      How would you like to obtain your AVS? MyChart  If the video visit is dropped, the invitation should be resent by: Text to cell phone: [phone]  Will anyone else be joining your video visit? No      I have reviewed and updated the patient's allergies and medication list.    Concerns: none  Refills: none     Vitals - Patient Reported  Weight (Patient Reported): 88 kg (194 lb)  Height (Patient Reported): 170.2 cm (5' 7\")  BMI (Based on Pt Reported Ht/Wt): 30.38  Pain Score: No Pain (0)    Ching Vera CMA        Video Start Time: 150  Video-Visit Details    Type of service:  Video Visit    Video End Time:205    Originating Location (pt. Location): Home    Distant Location (provider location):  [facility]     Platform used for Video Visit: Annette    "

## 2021-03-24 NOTE — LETTER
"3/24/2021        RE: Holli Thacker  [address]  [facility] [address]     Dear Colleague,    Thank you for referring your patient, Holli Thacker, to the [facility] at [facility]. Please see a copy of my visit note below.    Usha is a 45 year old who is being evaluated via a billable video visit.      How would you like to obtain your AVS? MyChart  If the video visit is dropped, the invitation should be resent by: Text to cell phone: [phone]  Will anyone else be joining your video visit? No      I have reviewed and updated the patient's allergies and medication list.    Concerns: none  Refills: none     Vitals - Patient Reported  Weight (Patient Reported): 88 kg (194 lb)  Height (Patient Reported): 170.2 cm (5' 7\")  BMI (Based on Pt Reported Ht/Wt): 30.38  Pain Score: No Pain (0)    Ching Vera CMA        Video Start Time: 150  Video-Visit Details    Type of service:  Video Visit    Video End Time:205    Originating Location (pt. Location): Home    Distant Location (provider location):  [facility] CANCER [facility]     Platform used for Video Visit: [facility]      LUNG NODULE & INTERVENTIONAL PULMONARY CLINIC  CLINICS & SURGERY CENTER, [facility]     Holli Thacker MRN# [id number]   Age: 45 year old YOB: 1975     Reason for Consultation: lung nodule(s)    Requesting Physician: Lawrence Smith MD  [facility]  [address]  [place] [address]       Assessment and Plan:    1. Established solitary pulmonary lung nodule(s). Given the characteristics on current/previous imaging and risk factors; I would classify this to be Low (<6%) risk for cancer.   -stable for 4 years.  Does not require further follow up.    2. She will qualify for lung cancer screening at 50.    3. Mild asthma, on appropriate " regimen.    4. Counseled to quit smoking.    Avtar Moralez MD         History:   45F with history of asthma, here for pulmonary nodule.    She has asthma that is reasonable controlled on her medications.    She was diagnosed with an incidental pulmonary nodule on a recent CT PE protocol and was referred here.  She denies infection symptoms, hemoptysis or other issues.  She reports having a pulmonary nodule on a CT in the past.    CT chest reviewed and interpreted by me: small medial RUL nodule.    Outside records requested and reviewed from [facility].  Previous CT that was not available before was requested and reviewed.  Nodule was present in 2017.         Past Medical History:      Past Medical History:   Diagnosis Date     Anxiety      Arnold-Chiari malformation, type I (H)      Depressive disorder      Fibromyalgia      Migraine      PONV (postoperative nausea and vomiting)      Rheumatoid arthritis flare (H)      Uncomplicated asthma            Past Surgical History:      Past Surgical History:   Procedure Laterality Date     GYN SURGERY       LAPAROSCOPIC CYSTECTOMY OVARIAN (BENIGN) Right 3/17/2018    Procedure: LAPAROSCOPIC CYSTECTOMY OVARIAN (BENIGN);  Diagnostic laparoscopy, right ovarian cystectomy and partial salpingectomy;  Surgeon: Juice Gold MD;  Location: [facility] OR     LAPAROSCOPY DIAGNOSTIC (GYN) N/A 3/17/2018    Procedure: LAPAROSCOPY DIAGNOSTIC (GYN);;  Surgeon: Juice Gold MD;  Location: [facility] OR     OVARY SURGERY Left           Social History:     Social History     Tobacco Use     Smoking status: Current Every Day Smoker     Smokeless tobacco: Never Used   Substance Use Topics     Alcohol use: No     Comment: 1 per month           Family History:   No family history on file.        Allergies:      Allergies   Allergen Reactions     Azithromycin      Erythromycin      Sertraline Other (See Comments)     Generic does not work for her  Generic does not work for her  Generic does not  work for her  Generic does not work for her       Verapamil           Medications:     Current Outpatient Medications   Medication Sig     amoxicillin-clavulanate (AUGMENTIN) 875-125 MG tablet      cetirizine (ZYRTEC) 10 MG tablet Take 20 mg by mouth     Cholecalciferol (VITAMIN D3) 3000 UNITS TABS Take 5,000 Units by mouth daily     cyclobenzaprine (FLEXERIL) 10 MG tablet Take 10 mg by mouth At Bedtime      fluticasone-salmeterol (ADVAIR) 500-50 MCG/DOSE diskus inhaler Inhale 1 puff into the lungs 2 times daily     ipratropium - albuterol 0.5 mg/2.5 mg/3 mL (DUONEB) 0.5-2.5 (3) MG/3ML nebulization Take 1 vial (3 mLs) by nebulization 3 times daily     montelukast (SINGULAIR) 10 MG tablet      Montelukast Sodium (SINGULAIR PO) Take 10 mg by mouth At Bedtime      omalizumab (XOLAIR) 150 MG injection Inject 300 mg Subcutaneous See Admin Instructions      pantoprazole (PROTONIX) 40 MG EC tablet      pregabalin (LYRICA) 25 MG capsule Take 25 mg by mouth     SPIRIVA RESPIMAT 1.25 MCG/ACT inhaler      Upadacitinib ER (RINVOQ) 15 MG TB24      Venlafaxine HCl (EFFEXOR PO) Take 37.5 mg by mouth 2 times daily     vitamin B-12 (CYANOCOBALAMIN) 100 MCG tablet Take 50 mcg by mouth     ibuprofen (ADVIL/MOTRIN) 200 MG tablet Take 1-2 tablets (200-400 mg) by mouth every 6 hours as needed for other (mild pain) (Patient not taking: Reported on 3/6/2021)     lidocaine (LIDODERM) 5 % patch Place 1 patch onto the skin every 24 hours for 7 days To prevent lidocaine toxicity, patient should be patch free for 12 hrs daily. (Patient not taking: Reported on 3/24/2021)     No current facility-administered medications for this visit.           Review of Systems:     See hpi         Physical Exam:     Constitutional - looks well, in no apparent distress  Eyes - no redness or discharge  Respiratory -breathing appears comfortable. No wheeze or rhonchi.   Cardiac -- Normal rate, rhythm.   Skin - No appreciable discoloration or lesions (very limited  exam)  Neurological - No apparent tremors. Speech fluent and articlate  Psychiatric - no signs of delirium or anxiety     Exam limited to that easily identified on a virtual visit. The rest of a comprehensive physical examination is deferred due to PHE (public health emergency) video visit restrictions.           Again, thank you for allowing me to participate in the care of your patient.      Sincerely,    Avtar Moralez MD

## 2021-03-25 ENCOUNTER — INFUSION THERAPY VISIT (OUTPATIENT)
Dept: INFUSION THERAPY | Facility: CLINIC | Age: 46
End: 2021-03-25
Attending: ALLERGY & IMMUNOLOGY
Payer: COMMERCIAL

## 2021-03-25 VITALS
RESPIRATION RATE: 16 BRPM | TEMPERATURE: 98 F | SYSTOLIC BLOOD PRESSURE: 115 MMHG | DIASTOLIC BLOOD PRESSURE: 78 MMHG | HEART RATE: 85 BPM | OXYGEN SATURATION: 97 %

## 2021-03-25 DIAGNOSIS — L50.1 URTICARIA, IDIOPATHIC: Primary | ICD-10-CM

## 2021-03-25 PROCEDURE — 250N000011 HC RX IP 250 OP 636

## 2021-03-25 PROCEDURE — 96372 THER/PROPH/DIAG INJ SC/IM: CPT

## 2021-03-25 RX ADMIN — OMALIZUMAB 300 MG: 150 INJECTION, SOLUTION SUBCUTANEOUS at 12:16

## 2021-03-25 NOTE — PROGRESS NOTES
Infusion Nursing Note:  Holli ALBERT Garrettgris Thacker presents today for xOLAIR.    Patient seen by provider today: No   present during visit today: Not Applicable.    Note: N/A.  Patient didmeet criteria for an asymptomatic covid-19 PCR test in infusion today. Patient declined the covid-19 test.    Intravenous Access:  No Intravenous access/labs at this visit.    Treatment Conditions:  Not Applicable.      Post Infusion Assessment:  Patient tolerated injection without incident.  Patient observed for 30 minutes post xOLAIR per protocol.       Discharge Plan:   Discharge instructions reviewed with: Patient.  Patient and/or family verbalized understanding of discharge instructions and all questions answered.  AVS to patient via [employer].  Patient will CALL for next appointment.   Patient discharged in stable condition accompanied by: self.  Departure Mode: Ambulatory.    Cheryl Billings RN

## 2021-04-14 ENCOUNTER — INFUSION THERAPY VISIT (OUTPATIENT)
Dept: INFUSION THERAPY | Facility: CLINIC | Age: 46
End: 2021-04-14
Attending: ALLERGY & IMMUNOLOGY
Payer: COMMERCIAL

## 2021-04-14 VITALS
HEART RATE: 84 BPM | OXYGEN SATURATION: 96 % | TEMPERATURE: 99 F | DIASTOLIC BLOOD PRESSURE: 76 MMHG | SYSTOLIC BLOOD PRESSURE: 123 MMHG

## 2021-04-14 DIAGNOSIS — L50.1 URTICARIA, IDIOPATHIC: Primary | ICD-10-CM

## 2021-04-14 PROCEDURE — 96372 THER/PROPH/DIAG INJ SC/IM: CPT

## 2021-04-14 PROCEDURE — 250N000011 HC RX IP 250 OP 636

## 2021-04-14 RX ADMIN — OMALIZUMAB 300 MG: 150 INJECTION, SOLUTION SUBCUTANEOUS at 11:28

## 2021-04-14 NOTE — PROGRESS NOTES
Infusion Nursing Note:  Holli ALBERT Garrett Kedar presents today for Xolair.    Patient seen by provider today: No   present during visit today: Not Applicable.    Note: N/A.      Intravenous Access:  No Intravenous access/labs at this visit.    Treatment Conditions:  Not Applicable.      Post Infusion Assessment:  Patient tolerated injection without incident.  Patient observed for 30 minutes post Xolair per protocol.  Site patent and intact, free from redness, edema or discomfort.       Discharge Plan:   Discharge instructions reviewed with: Patient.  Patient and/or family verbalized understanding of discharge instructions and all questions answered.  AVS to patient via [employer].  Patient will return 5/6/21 for next Xolair injection for next appointment.   Patient discharged in stable condition accompanied by: self.  Departure Mode: Ambulatory.    Mercedes Lane RN

## 2021-04-15 RX ORDER — HEPARIN SODIUM (PORCINE) LOCK FLUSH IV SOLN 100 UNIT/ML 100 UNIT/ML
5 SOLUTION INTRAVENOUS
Status: CANCELLED | OUTPATIENT
Start: 2021-04-15

## 2021-04-15 RX ORDER — ACETAMINOPHEN 325 MG/1
650 TABLET ORAL EVERY 4 HOURS PRN
Status: CANCELLED
Start: 2021-04-15

## 2021-04-15 RX ORDER — LORATADINE 10 MG/1
10 TABLET ORAL ONCE
Status: CANCELLED
Start: 2021-04-15 | End: 2021-04-15

## 2021-04-15 RX ORDER — HEPARIN SODIUM,PORCINE 10 UNIT/ML
5 VIAL (ML) INTRAVENOUS
Status: CANCELLED | OUTPATIENT
Start: 2021-04-15

## 2021-04-22 ENCOUNTER — INFUSION THERAPY VISIT (OUTPATIENT)
Dept: INFUSION THERAPY | Facility: CLINIC | Age: 46
End: 2021-04-22
Attending: ALLERGY & IMMUNOLOGY
Payer: COMMERCIAL

## 2021-04-22 VITALS
TEMPERATURE: 99.1 F | DIASTOLIC BLOOD PRESSURE: 75 MMHG | SYSTOLIC BLOOD PRESSURE: 119 MMHG | RESPIRATION RATE: 16 BRPM | OXYGEN SATURATION: 95 % | HEART RATE: 76 BPM

## 2021-04-22 DIAGNOSIS — G43.711 CHRONIC MIGRAINE WITHOUT AURA, WITH INTRACTABLE MIGRAINE, SO STATED, WITH STATUS MIGRAINOSUS: ICD-10-CM

## 2021-04-22 DIAGNOSIS — M62.838 SPASM OF MUSCLE: Primary | ICD-10-CM

## 2021-04-22 DIAGNOSIS — G43.111 INTRACTABLE MIGRAINE WITH AURA WITH STATUS MIGRAINOSUS: ICD-10-CM

## 2021-04-22 PROCEDURE — 96365 THER/PROPH/DIAG IV INF INIT: CPT

## 2021-04-22 PROCEDURE — 258N000003 HC RX IP 258 OP 636: Performed by: INTERNAL MEDICINE

## 2021-04-22 PROCEDURE — 250N000011 HC RX IP 250 OP 636: Performed by: INTERNAL MEDICINE

## 2021-04-22 RX ORDER — LORATADINE 10 MG/1
10 TABLET ORAL ONCE
Status: CANCELLED
Start: 2021-07-21 | End: 2021-07-21

## 2021-04-22 RX ORDER — HEPARIN SODIUM (PORCINE) LOCK FLUSH IV SOLN 100 UNIT/ML 100 UNIT/ML
5 SOLUTION INTRAVENOUS
Status: CANCELLED | OUTPATIENT
Start: 2021-07-21

## 2021-04-22 RX ORDER — ACETAMINOPHEN 325 MG/1
650 TABLET ORAL EVERY 4 HOURS PRN
Status: CANCELLED
Start: 2021-07-21

## 2021-04-22 RX ORDER — HEPARIN SODIUM,PORCINE 10 UNIT/ML
5 VIAL (ML) INTRAVENOUS
Status: CANCELLED | OUTPATIENT
Start: 2021-07-21

## 2021-04-22 RX ADMIN — EPTINEZUMAB-JJMR 100 MG: 100 INJECTION INTRAVENOUS at 13:52

## 2021-04-22 RX ADMIN — SODIUM CHLORIDE 250 ML: 9 INJECTION, SOLUTION INTRAVENOUS at 13:58

## 2021-04-22 NOTE — PROGRESS NOTES
Infusion Nursing Note:  Holli Thacker presents today for Vyepti.     present during visit today: Not Applicable.    Note: Pharmacist in to help review use of and potential SE of this drug.  .    Intravenous Access:  Peripheral IV placed.    Treatment Conditions:  IV flushed with at least 20 ml NS before and after infusion    Post Lab Assessment:  Patient tolerated infusion   Site patent and intact, free from redness, edema or discomfort.  No evidence of extravasations.  Access discontinued)     Discharge Plan:   Patient and/or family verbalized understanding of  instructions and all questions answered.  Patient  to lobby in stable condition accompanied by: self.  Patient to see provider today: NO  Departure Mode: Ambulatory.  Valentina Irene, RN, RN

## 2021-05-05 ENCOUNTER — INFUSION THERAPY VISIT (OUTPATIENT)
Dept: INFUSION THERAPY | Facility: CLINIC | Age: 46
End: 2021-05-05
Attending: ALLERGY & IMMUNOLOGY
Payer: COMMERCIAL

## 2021-05-05 VITALS
HEART RATE: 79 BPM | DIASTOLIC BLOOD PRESSURE: 71 MMHG | TEMPERATURE: 99.1 F | OXYGEN SATURATION: 95 % | SYSTOLIC BLOOD PRESSURE: 112 MMHG

## 2021-05-05 DIAGNOSIS — L50.1 URTICARIA, IDIOPATHIC: Primary | ICD-10-CM

## 2021-05-05 PROCEDURE — 250N000011 HC RX IP 250 OP 636

## 2021-05-05 PROCEDURE — 96372 THER/PROPH/DIAG INJ SC/IM: CPT

## 2021-05-05 RX ADMIN — OMALIZUMAB 300 MG: 150 INJECTION, SOLUTION SUBCUTANEOUS at 11:45

## 2021-05-05 NOTE — PROGRESS NOTES
Infusion Nursing Note:  Holli Thacker presents today for Xolair.    Patient seen by provider today: No   present during visit today: Not Applicable.    Note: Pt reports she is ready for her Xolair injections today. Feels generalized itching with mild hives on ankles/tops of feet.  States she typically sees improvement of the hives within just a few hours of receiving Xolair.      Intravenous Access:  No Intravenous access/labs at this visit.    Treatment Conditions:  Not Applicable.      Post Infusion Assessment:  Patient tolerated injection without incident.  Patient observed for 30 minutes post Xolair per protocol.  Site patent and intact, free from redness, edema or discomfort.       Discharge Plan:   Discharge instructions reviewed with: Patient.  Patient and/or family verbalized understanding of discharge instructions and all questions answered.  AVS to patient via [employer].  Patient will return 5/27/21 for next Xolair injection for next appointment.   Patient discharged in stable condition accompanied by: self.  Departure Mode: Ambulatory.    Mercedes Lane RN

## 2021-05-17 ENCOUNTER — HOSPITAL ENCOUNTER (EMERGENCY)
Facility: CLINIC | Age: 46
Discharge: HOME | End: 2021-05-17
Attending: EMERGENCY MEDICINE | Admitting: EMERGENCY MEDICINE
Payer: COMMERCIAL

## 2021-05-17 ENCOUNTER — APPOINTMENT (OUTPATIENT)
Dept: GENERAL RADIOLOGY | Facility: CLINIC | Age: 46
End: 2021-05-17
Attending: EMERGENCY MEDICINE
Payer: COMMERCIAL

## 2021-05-17 VITALS
TEMPERATURE: 98 F | HEART RATE: 77 BPM | DIASTOLIC BLOOD PRESSURE: 79 MMHG | RESPIRATION RATE: 8 BRPM | SYSTOLIC BLOOD PRESSURE: 90 MMHG | OXYGEN SATURATION: 95 %

## 2021-05-17 DIAGNOSIS — R07.9 EXERTIONAL CHEST PAIN: ICD-10-CM

## 2021-05-17 LAB
ANION GAP SERPL CALCULATED.3IONS-SCNC: 4 MMOL/L (ref 3–14)
BASOPHILS # BLD AUTO: 0 10E9/L (ref 0–0.2)
BASOPHILS NFR BLD AUTO: 0.6 %
BUN SERPL-MCNC: 12 MG/DL (ref 7–30)
CALCIUM SERPL-MCNC: 8.4 MG/DL (ref 8.5–10.1)
CHLORIDE SERPL-SCNC: 110 MMOL/L (ref 94–109)
CO2 SERPL-SCNC: 27 MMOL/L (ref 20–32)
CREAT SERPL-MCNC: 0.88 MG/DL (ref 0.52–1.04)
DIFFERENTIAL METHOD BLD: NORMAL
EOSINOPHIL # BLD AUTO: 0.1 10E9/L (ref 0–0.7)
EOSINOPHIL NFR BLD AUTO: 1 %
ERYTHROCYTE [DISTWIDTH] IN BLOOD BY AUTOMATED COUNT: 13.6 % (ref 10–15)
GFR SERPL CREATININE-BSD FRML MDRD: 78 ML/MIN/{1.73_M2}
GLUCOSE SERPL-MCNC: 90 MG/DL (ref 70–99)
HCT VFR BLD AUTO: 38.9 % (ref 35–47)
HGB BLD-MCNC: 12.4 G/DL (ref 11.7–15.7)
IMM GRANULOCYTES # BLD: 0 10E9/L (ref 0–0.4)
IMM GRANULOCYTES NFR BLD: 0.1 %
LYMPHOCYTES # BLD AUTO: 1.9 10E9/L (ref 0.8–5.3)
LYMPHOCYTES NFR BLD AUTO: 27.9 %
MCH RBC QN AUTO: 31.6 PG (ref 26.5–33)
MCHC RBC AUTO-ENTMCNC: 31.9 G/DL (ref 31.5–36.5)
MCV RBC AUTO: 99 FL (ref 78–100)
MONOCYTES # BLD AUTO: 0.5 10E9/L (ref 0–1.3)
MONOCYTES NFR BLD AUTO: 7 %
NEUTROPHILS # BLD AUTO: 4.3 10E9/L (ref 1.6–8.3)
NEUTROPHILS NFR BLD AUTO: 63.4 %
NRBC # BLD AUTO: 0 10*3/UL
NRBC BLD AUTO-RTO: 0 /100
PLATELET # BLD AUTO: 336 10E9/L (ref 150–450)
POTASSIUM SERPL-SCNC: 4 MMOL/L (ref 3.4–5.3)
RBC # BLD AUTO: 3.93 10E12/L (ref 3.8–5.2)
SODIUM SERPL-SCNC: 141 MMOL/L (ref 133–144)
TROPONIN I SERPL-MCNC: <0.015 UG/L (ref 0–0.04)
TROPONIN I SERPL-MCNC: <0.015 UG/L (ref 0–0.04)
WBC # BLD AUTO: 6.8 10E9/L (ref 4–11)

## 2021-05-17 PROCEDURE — 80048 BASIC METABOLIC PNL TOTAL CA: CPT | Performed by: EMERGENCY MEDICINE

## 2021-05-17 PROCEDURE — 99285 EMERGENCY DEPT VISIT HI MDM: CPT | Mod: 25

## 2021-05-17 PROCEDURE — 85025 COMPLETE CBC W/AUTO DIFF WBC: CPT | Performed by: EMERGENCY MEDICINE

## 2021-05-17 PROCEDURE — 93005 ELECTROCARDIOGRAM TRACING: CPT

## 2021-05-17 PROCEDURE — 71046 X-RAY EXAM CHEST 2 VIEWS: CPT

## 2021-05-17 PROCEDURE — 258N000003 HC RX IP 258 OP 636: Performed by: EMERGENCY MEDICINE

## 2021-05-17 PROCEDURE — 84484 ASSAY OF TROPONIN QUANT: CPT | Performed by: EMERGENCY MEDICINE

## 2021-05-17 PROCEDURE — 96360 HYDRATION IV INFUSION INIT: CPT

## 2021-05-17 RX ADMIN — SODIUM CHLORIDE 1000 ML: 9 INJECTION, SOLUTION INTRAVENOUS at 16:44

## 2021-05-17 ASSESSMENT — ENCOUNTER SYMPTOMS
FEVER: 0
VOMITING: 0
COUGH: 0
LIGHT-HEADEDNESS: 1
NAUSEA: 0
WEAKNESS: 1
SHORTNESS OF BREATH: 1

## 2021-05-17 NOTE — DISCHARGE INSTRUCTIONS
Please see your primary care doctor in the next 2-3 days for a recheck.    Return to the Emergency Room if you develop more chest pain, shortness of breath, arm pain, sweatiness, you feel unwell or if you have any new concerns about your health.      We will call you tomorrow to schedule a stress test within the next three days.  If you do not hear from us please call the cardiopulmonary department [phone]      It was my pleasure to take care of you today. Thanks for visiting [facility]   Emergency Room.     Ab Garay MD  Emergency Medicine Physician    Discharge Instructions  Chest Pain    You have been seen today for chest pain or discomfort.  At this time, your doctor has found no signs that your chest pain is due to a serious or life-threatening condition, (or you have declined more testing and/or admission to the hospital). However, sometimes there is a serious problem that does not show up right away. Your evaluation today may not be complete and you may need further testing and evaluation.     You need to follow-up with your regular doctor within 3 days.    Return to the Emergency Department if:  Your chest pain changes, gets worse, starts to happen more often, or comes with less activity.  You are short of breath.  You get very weak or tired.  You pass out or faint.  You have any new symptoms, like fever, cough, numb legs, or you cough up blood.  You have anything else that worries you.    Until you follow-up with your regular doctor please do the following:  Take one aspirin daily unless you have an allergy or are told not to by your doctor.  If a stress test appointment has been made, go to the appointment.  If you have questions, contact your regular doctor.    If your doctor today has told you to follow-up with your regular doctor, it is very important that you make an appointment with your clinic and go to the appointment.  If you do not follow-up with your primary doctor, it may result  in missing an important development which could result in permanent injury or disability and/or lasting pain.  If there is any problem keeping your appointment, call your doctor or return to the Emergency Department.    If you were given a prescription for medicine here today, be sure to read all of the information (including the package insert) that comes with your prescription.  This will include important information about the medicine, its side effects, and any warnings that you need to know about.  The pharmacist who fills the prescription can provide more information and answer questions you may have about the medicine.  If you have questions or concerns that the pharmacist cannot address, please call or return to the Emergency Department.

## 2021-05-17 NOTE — ED TRIAGE NOTES
Pt arrives from home via EMS after reports of chest pain and SOB after 20 minutes of mowing lawn. Minimal relief after using inhaler and taking 324mg of Aspirin, 2 nitroglycerin tabs Hx of asthma. Drove home from [facility] last week. VSS, A&Ox4

## 2021-05-17 NOTE — ED PROVIDER NOTES
History   Chief Complaint:  Chest Pain and Shortness of Breath     HPI   Holli Thacker is a 45 year old adult with history of uncomplicated asthma and anxiety who presents with chest pain and shortness of breath. Patient states at approximately 1300 today after she finished mowing her lawn, she felt onset chest pain and shortness of breath. She states she felt weak after mowing a hill on her home and came inside to cool down. She began to feet lightheaded and reports labored breathing which prompted her to call EMS. She reports minimal relief after using her inhaler, intake of 324 mg PO aspirin, and 2 nitroglycerin tabs. She had similar symptoms occur when snow blowing last winter, but at that time, the pain resolved on its own. She denies a medical history of diabetes, hypertension, or high cholesterol. She is an everyday smoker and has a family history of heart problems on her father's side but no sudden death at a young age. She denies fever, cough, nausea, vomiting, or leg swelling. She is having trouble with her allergies but is taking Zyrtec.     ECHOCARDIOGRAM: 04/02/21   1. Normal left ventricular size, normal wall thickness, normal global systolic function, calculated EF of 66 %.   2. The mitral valve is normal, mild mitral regurgitation.    Review of Systems   Constitutional: Negative for fever.   Respiratory: Positive for shortness of breath. Negative for cough.    Cardiovascular: Positive for chest pain. Negative for leg swelling.   Gastrointestinal: Negative for nausea and vomiting.   Neurological: Positive for weakness and light-headedness.   All other systems reviewed and are negative.      Allergies:  Azithromycin  Erythromycin  Sertraline  Verapamil    Medications:  Flexeril   Advair   Duoneb   Singulair   Omalizumab   Protonix   Lyrica   Upadacitnib   Effexor     Past Medical History:    Anxiety   Arnold-Chiari Malformation, Type I  Depressive Disorder  Chronic GERD  Chronic Migraine    Rheumatoid Arthritis   Uncomplicated Asthma     Past Surgical History:    Laparoscopic Cystectomy Ovarian, Right  Ovary Surgery, Left     Family History:    Family history of heart problems on her father's side but no sudden death at an age under 50.     Social History:  Patient arrives via EMS from home.   Everyday tobacco smoker.     Physical Exam     Patient Vitals for the past 24 hrs:   BP Temp Temp src Pulse Resp SpO2   05/17/21 1745 90/79 -- -- 77 -- 95 %   05/17/21 1715 108/70 -- -- 80 -- 94 %   05/17/21 1645 111/70 -- -- -- -- --   05/17/21 1500 117/71 -- -- 95 8 97 %   05/17/21 1436 120/76 98  F (36.7  C) Oral 98 20 93 %       Physical Exam  Nursing note and vitals reviewed.  HENT:   Mouth/Throat: Moist mucous membranes.   Eyes: EOMI, nonicteric sclera  Cardiovascular: Normal rate, regular rhythm, no murmurs, rubs, or gallops  Pulmonary/Chest: Effort normal and breath sounds normal. No respiratory distress. No wheezes. No rales.   Abdominal: Soft. Nontender, nondistended, no guarding or rigidity.   Musculoskeletal: Normal range of motion.   Neurological: Alert. Moves all extremities spontaneously.   Skin: Skin is warm and dry. No rash noted.   Psychiatric: Normal mood and affect.       Emergency Department Course   ECG  ECG taken at 1436, ECG read at 1459  Normal sinus rhythm  Normal ECG   No change as compared to prior, dated 3/17/21.  Rate 85 bpm. SC interval 160 ms. QRS duration 80 ms. QT/QTc 394/468 ms. P-R-T axes 50 11 16.     Imaging:  Chest XR,  PA & LAT  There are no acute infiltrates. The cardiac silhouette is not enlarged. Pulmonary vasculature is unremarkable.    Imaging independently reviewed and agree with radiologist interpretation.     Laboratory:  CBC: WBC 6.8, HGB 12.4,    BMP: Chloride 110 (H), Calcium 8.4 (L), o/w WNL (Creatinine: 0.88)    Troponin (Collected 1530): <0.015  Troponin (Collected 1645): <0.015    Emergency Department Course:    Reviewed:  I reviewed nursing notes,  vitals and past medical history    Assessments:  1502 I obtained history and examined the patient as noted above.   1633 I rechecked the patient and explained findings.   1730 I rechecked on the patient. She is discussing possible admission her her .     Interventions:  1644 0.9% NaCl bolus 1,000 mL IV    Disposition:  The patient was discharged to home.     Impression & Plan     Medical Decision Making:  Pt presents with CC chest pain just after finishing mowing the lawn. Pt had 1 similar episode prior while snowblowing last winter. No symptoms in the interim. Symptoms didn't resolve despite nitro but have resolved with time. EKG nonischemic. Troponin negative x2. She is PERC negative, therefore no further workup for PE indicated. CXR negative for acute etiology. Vital signs normal. Pt is moderate risk by HEART score but low risk by EDACS. I discussed the above with pt and her . I offered observation admission, which pt initially was going to go forward with, but after some discussion with her  she changed her mind and is wishing for discharge home. I think this is reasonable. I recommended stress testing which was ordered. Also recommended daily aspirin until follow-up with pcp which will ideally occur after stress test this week. She is in stable condition at the time of discharge, indications for return to the ED were discussed as well as follow up. All questions were answered and she is in agreement with the plan.         Diagnosis:    ICD-10-CM    1. Exertional chest pain  R07.9        Scribe Disclosure:  I, Ryan Todd, am serving as a scribe at 2:48 PM on 5/17/2021 to document services personally performed by Ab Garay MD based on my observations and the provider's statements to me.              Ab Garay MD  05/18/21 1163

## 2021-05-17 NOTE — LETTER
05/17/21      To Whom it may concern:    Omkar Thacker was in our Emergency Department today, 05/17/21 with a patient who needed their assistance.  Please excuse them from work.      Sincerely,  Mena Rai RN

## 2021-05-17 NOTE — ED NOTES
Bed: ED12  Expected date: 5/17/21  Expected time: 2:13 PM  Means of arrival: Ambulance  Comments:  [id number]

## 2021-05-18 LAB — INTERPRETATION ECG - MUSE: NORMAL

## 2021-05-26 ENCOUNTER — RECORDS - HEALTHEAST (OUTPATIENT)
Dept: ADMINISTRATIVE | Facility: CLINIC | Age: 46
End: 2021-05-26

## 2021-05-27 ENCOUNTER — INFUSION THERAPY VISIT (OUTPATIENT)
Dept: INFUSION THERAPY | Facility: CLINIC | Age: 46
End: 2021-05-27
Attending: ALLERGY & IMMUNOLOGY
Payer: COMMERCIAL

## 2021-05-27 VITALS
DIASTOLIC BLOOD PRESSURE: 66 MMHG | HEART RATE: 89 BPM | TEMPERATURE: 98 F | OXYGEN SATURATION: 99 % | SYSTOLIC BLOOD PRESSURE: 116 MMHG | RESPIRATION RATE: 16 BRPM

## 2021-05-27 DIAGNOSIS — L50.1 URTICARIA, IDIOPATHIC: Primary | ICD-10-CM

## 2021-05-27 PROCEDURE — 96372 THER/PROPH/DIAG INJ SC/IM: CPT

## 2021-05-27 PROCEDURE — 250N000011 HC RX IP 250 OP 636

## 2021-05-27 RX ADMIN — OMALIZUMAB 300 MG: 150 INJECTION, SOLUTION SUBCUTANEOUS at 13:02

## 2021-05-27 ASSESSMENT — PAIN SCALES - GENERAL: PAINLEVEL: NO PAIN (0)

## 2021-05-27 NOTE — PROGRESS NOTES
Infusion Nursing Note:  Holli ALBERT Garrett Lee presents today for Xolair.   Patient seen by provider today: No   present during visit today: Not Applicable.    Note: Does have clear sinus drainage.  Denies recent SOB.  No change in cough.  Denies hives.    Intravenous Access:  No Intravenous access/labs at this visit.    Treatment Conditions:  Not Applicable.      Post Infusion Assessment:  Patient tolerated injection without incident.  Patient observed for 30 minutes post Xolair per protocol.       Discharge Plan:   Discharge instructions reviewed with: Patient.  Patient discharged in stable condition accompanied by: self.  Departure Mode: Ambulatory.  Next injection is scheduled for 6/16/21.      DARYL SETHI RN

## 2021-06-09 ENCOUNTER — HOSPITAL ENCOUNTER (OUTPATIENT)
Dept: CARDIOLOGY | Facility: CLINIC | Age: 46
Discharge: HOME | End: 2021-06-09
Attending: EMERGENCY MEDICINE | Admitting: EMERGENCY MEDICINE
Payer: COMMERCIAL

## 2021-06-09 DIAGNOSIS — R07.9 EXERTIONAL CHEST PAIN: ICD-10-CM

## 2021-06-09 PROCEDURE — 93321 DOPPLER ECHO F-UP/LMTD STD: CPT | Mod: 26 | Performed by: INTERNAL MEDICINE

## 2021-06-09 PROCEDURE — 93325 DOPPLER ECHO COLOR FLOW MAPG: CPT | Mod: TC

## 2021-06-09 PROCEDURE — 93016 CV STRESS TEST SUPVJ ONLY: CPT | Performed by: INTERNAL MEDICINE

## 2021-06-09 PROCEDURE — 93350 STRESS TTE ONLY: CPT | Mod: TC

## 2021-06-09 PROCEDURE — 93325 DOPPLER ECHO COLOR FLOW MAPG: CPT | Mod: 26 | Performed by: INTERNAL MEDICINE

## 2021-06-09 PROCEDURE — 93018 CV STRESS TEST I&R ONLY: CPT | Performed by: INTERNAL MEDICINE

## 2021-06-09 PROCEDURE — 93350 STRESS TTE ONLY: CPT | Mod: 26 | Performed by: INTERNAL MEDICINE

## 2021-06-16 ENCOUNTER — INFUSION THERAPY VISIT (OUTPATIENT)
Dept: INFUSION THERAPY | Facility: CLINIC | Age: 46
End: 2021-06-16
Attending: INTERNAL MEDICINE
Payer: COMMERCIAL

## 2021-06-16 VITALS
OXYGEN SATURATION: 95 % | TEMPERATURE: 98.1 F | RESPIRATION RATE: 16 BRPM | SYSTOLIC BLOOD PRESSURE: 116 MMHG | DIASTOLIC BLOOD PRESSURE: 69 MMHG | HEART RATE: 80 BPM

## 2021-06-16 DIAGNOSIS — L50.1 URTICARIA, IDIOPATHIC: Primary | ICD-10-CM

## 2021-06-16 PROCEDURE — 96372 THER/PROPH/DIAG INJ SC/IM: CPT

## 2021-06-16 PROCEDURE — 250N000011 HC RX IP 250 OP 636

## 2021-06-16 RX ADMIN — OMALIZUMAB 300 MG: 150 INJECTION, SOLUTION SUBCUTANEOUS at 13:16

## 2021-06-16 ASSESSMENT — PAIN SCALES - GENERAL: PAINLEVEL: NO PAIN (0)

## 2021-06-16 NOTE — PROGRESS NOTES
Infusion Nursing Note:  Holli ROOSEVELT Thacker presents today for Xolair.   Patient seen by provider today: No   present during visit today: Not Applicable.    Note: Hives started to get worse over the past wk.  Has been spending more time outdoors.    Intravenous Access:  No Intravenous access/labs at this visit.    Treatment Conditions:  Not Applicable.      Post Infusion Assessment:  Patient tolerated infusion without incident.  Patient observed for 30 minutes post Xolair per protocol.       Discharge Plan:   Discharge instructions reviewed with: Patient.  Patient discharged in stable condition accompanied by: self.  Departure Mode: Ambulatory.  Next injection scheduled for 7/7/21.      DARYL SETHI RN

## 2021-07-13 ENCOUNTER — INFUSION THERAPY VISIT (OUTPATIENT)
Dept: INFUSION THERAPY | Facility: CLINIC | Age: 46
End: 2021-07-13
Attending: ALLERGY & IMMUNOLOGY
Payer: COMMERCIAL

## 2021-07-13 VITALS
RESPIRATION RATE: 16 BRPM | SYSTOLIC BLOOD PRESSURE: 115 MMHG | TEMPERATURE: 97.6 F | DIASTOLIC BLOOD PRESSURE: 77 MMHG | OXYGEN SATURATION: 98 % | HEART RATE: 65 BPM

## 2021-07-13 DIAGNOSIS — L50.1 URTICARIA, IDIOPATHIC: Primary | ICD-10-CM

## 2021-07-13 PROCEDURE — 250N000011 HC RX IP 250 OP 636: Performed by: INTERNAL MEDICINE

## 2021-07-13 PROCEDURE — 96372 THER/PROPH/DIAG INJ SC/IM: CPT | Performed by: INTERNAL MEDICINE

## 2021-07-13 RX ADMIN — OMALIZUMAB 300 MG: 150 INJECTION, SOLUTION SUBCUTANEOUS at 08:43

## 2021-07-13 ASSESSMENT — PAIN SCALES - GENERAL: PAINLEVEL: NO PAIN (0)

## 2021-07-13 NOTE — PROGRESS NOTES
"Infusion Nursing Note:  Holli Thacker presents today for Xolair.    Patient seen by provider today: No   present during visit today: Not Applicable.    Note: Patient aware we need new orders for next visit; has provider appointment prior to next injection appointment.  Today's injection is \"about a week\" late; she has noticed \"a couple\" hives.    Patient declined the covid-19 test.    Intravenous Access:  No Intravenous access/labs at this visit.    Treatment Conditions:  Not Applicable.    Post Infusion Assessment:  Patient tolerated injection without incident.  Patient observed for 30 minutes post Xolair per protocol.     Discharge Plan:   Discharge instructions reviewed with: Patient.  Patient and/or family verbalized understanding of discharge instructions and all questions answered.  AVS to patient via [employer].  Patient will return 7/22 for next appointment.   Patient discharged in stable condition accompanied by: self.  Departure Mode: Ambulatory.    Mercedez Gallagher RN          "

## 2021-07-22 ENCOUNTER — INFUSION THERAPY VISIT (OUTPATIENT)
Dept: INFUSION THERAPY | Facility: CLINIC | Age: 46
End: 2021-07-22
Attending: ALLERGY & IMMUNOLOGY
Payer: COMMERCIAL

## 2021-07-22 VITALS
DIASTOLIC BLOOD PRESSURE: 71 MMHG | HEART RATE: 76 BPM | OXYGEN SATURATION: 99 % | TEMPERATURE: 97.5 F | RESPIRATION RATE: 16 BRPM | SYSTOLIC BLOOD PRESSURE: 107 MMHG

## 2021-07-22 DIAGNOSIS — G43.711 CHRONIC MIGRAINE WITHOUT AURA, WITH INTRACTABLE MIGRAINE, SO STATED, WITH STATUS MIGRAINOSUS: ICD-10-CM

## 2021-07-22 DIAGNOSIS — G43.111 INTRACTABLE MIGRAINE WITH AURA WITH STATUS MIGRAINOSUS: ICD-10-CM

## 2021-07-22 DIAGNOSIS — M62.838 SPASM OF MUSCLE: Primary | ICD-10-CM

## 2021-07-22 PROCEDURE — 250N000011 HC RX IP 250 OP 636: Performed by: INTERNAL MEDICINE

## 2021-07-22 PROCEDURE — 96365 THER/PROPH/DIAG IV INF INIT: CPT

## 2021-07-22 PROCEDURE — 258N000003 HC RX IP 258 OP 636: Performed by: INTERNAL MEDICINE

## 2021-07-22 RX ORDER — HEPARIN SODIUM (PORCINE) LOCK FLUSH IV SOLN 100 UNIT/ML 100 UNIT/ML
5 SOLUTION INTRAVENOUS
Status: CANCELLED | OUTPATIENT
Start: 2021-10-19

## 2021-07-22 RX ORDER — HEPARIN SODIUM,PORCINE 10 UNIT/ML
5 VIAL (ML) INTRAVENOUS
Status: CANCELLED | OUTPATIENT
Start: 2021-10-19

## 2021-07-22 RX ORDER — ACETAMINOPHEN 325 MG/1
650 TABLET ORAL EVERY 4 HOURS PRN
Status: CANCELLED
Start: 2021-10-19

## 2021-07-22 RX ORDER — LORATADINE 10 MG/1
10 TABLET ORAL ONCE
Status: CANCELLED
Start: 2021-10-19 | End: 2021-10-19

## 2021-07-22 RX ADMIN — SODIUM CHLORIDE 250 ML: 9 INJECTION, SOLUTION INTRAVENOUS at 13:25

## 2021-07-22 RX ADMIN — EPTINEZUMAB-JJMR 100 MG: 100 INJECTION INTRAVENOUS at 13:25

## 2021-07-22 ASSESSMENT — PAIN SCALES - GENERAL: PAINLEVEL: NO PAIN (0)

## 2021-07-22 NOTE — PROGRESS NOTES
Infusion Nursing Note:  Holli Thacker presents today for Vyepti.    Patient seen by provider today: No   present during visit today: Not Applicable.    Note: Patient stated her she's only had 2 migraines since her last Vyepti infusion. Migraine started a few days ago and has dissipated, but still present today while at infusion.     20 ml NS flushed pre and post infusion.   Progress note from today's infusion faxed to Dr. Mulugeta Ramirez as ordered.     Intravenous Access:  Peripheral IV placed.    Treatment Conditions:  Not Applicable.      Post Infusion Assessment:  Patient tolerated infusion without incident.  Blood return noted pre and post infusion.  Site patent and intact, free from redness, edema or discomfort.  No evidence of extravasations.  Access discontinued per protocol.       Discharge Plan:    Patient will return 7/28 for next Xolair appointment.  Patient discharged in stable condition accompanied by: self.  Departure Mode: Ambulatory.      Mercedes Breaux RN  Administrations This Visit     0.9% sodium chloride BOLUS     Admin Date  07/22/2021 Action  New Bag Dose  250 mL Route  Intravenous Administered By  Mercedes Breaux RN          eptinezumab-jjmr (VYEPTI) 100 mg in sodium chloride 0.9 % 111 mL infusion     Admin Date  07/22/2021 Action  New Bag Dose  100 mg Route  Intravenous Administered By  Mercedes Breaux RN          sodium chloride (PF) 0.9% PF flush 3-20 mL     Admin Date  07/22/2021 Action  Given Dose  20 mL Route  Intracatheter Administered By  Mercedes Breaux RN           Admin Date  07/22/2021 Action  Given Dose  20 mL Route  Intracatheter Administered By  Mercedes Breaux RN

## 2021-08-04 ENCOUNTER — INFUSION THERAPY VISIT (OUTPATIENT)
Dept: INFUSION THERAPY | Facility: CLINIC | Age: 46
End: 2021-08-04
Attending: ALLERGY & IMMUNOLOGY
Payer: COMMERCIAL

## 2021-08-04 VITALS
OXYGEN SATURATION: 98 % | DIASTOLIC BLOOD PRESSURE: 71 MMHG | HEART RATE: 72 BPM | SYSTOLIC BLOOD PRESSURE: 108 MMHG | RESPIRATION RATE: 16 BRPM | TEMPERATURE: 97.1 F

## 2021-08-04 DIAGNOSIS — L50.1 URTICARIA, IDIOPATHIC: Primary | ICD-10-CM

## 2021-08-04 PROCEDURE — 250N000011 HC RX IP 250 OP 636

## 2021-08-04 PROCEDURE — 96372 THER/PROPH/DIAG INJ SC/IM: CPT

## 2021-08-04 RX ADMIN — OMALIZUMAB 300 MG: 150 INJECTION, SOLUTION SUBCUTANEOUS at 13:33

## 2021-08-04 ASSESSMENT — PAIN SCALES - GENERAL: PAINLEVEL: SEVERE PAIN (6)

## 2021-08-04 NOTE — PROGRESS NOTES
Infusion Nursing Note:  Holli ROOSEVELT Thacker presents today for Xoliar.    Patient seen by provider today: No   present during visit today: Not Applicable.    Note: Patient reports SOB improved due to air quality.      Intravenous Access:  No Intravenous access/labs at this visit.    Treatment Conditions:  Not Applicable.      Post Infusion Assessment:  Patient tolerated injection without incident.  Patient observed for 30 minutes post Xolair per protocol.  Site patent and intact, free from redness, edema or discomfort.       Discharge Plan:   .  Patient will return 8/25 for next appointment.  Patient discharged in stable condition accompanied by: self.  Departure Mode: Ambulatory.      Mercedes Breaux RN

## 2021-08-25 ENCOUNTER — INFUSION THERAPY VISIT (OUTPATIENT)
Dept: INFUSION THERAPY | Facility: CLINIC | Age: 46
End: 2021-08-25
Attending: ALLERGY & IMMUNOLOGY
Payer: COMMERCIAL

## 2021-08-25 VITALS — SYSTOLIC BLOOD PRESSURE: 102 MMHG | HEART RATE: 71 BPM | OXYGEN SATURATION: 97 % | DIASTOLIC BLOOD PRESSURE: 61 MMHG

## 2021-08-25 DIAGNOSIS — L50.1 URTICARIA, IDIOPATHIC: Primary | ICD-10-CM

## 2021-08-25 PROCEDURE — 250N000011 HC RX IP 250 OP 636

## 2021-08-25 PROCEDURE — 96372 THER/PROPH/DIAG INJ SC/IM: CPT

## 2021-08-25 RX ADMIN — OMALIZUMAB 300 MG: 150 INJECTION, SOLUTION SUBCUTANEOUS at 14:35

## 2021-08-25 NOTE — PROGRESS NOTES
Infusion Nursing Note:  Holli ROOSEVELT Linoey Kedar presents today for Xolair.    Patient seen by provider today: No   present during visit today: Not Applicable.    Note: Hives/ithcing present on LEs today.        Intravenous Access:  No Intravenous access/labs at this visit.    Treatment Conditions:  Not Applicable.      Post Infusion Assessment:  Patient tolerated injection without incident.  Patient observed for 30 minutes post Xolair per protocol.  Site patent and intact, free from redness, edema or discomfort.       Discharge Plan:   Discharge instructions reviewed with: Patient.  Patient and/or family verbalized understanding of discharge instructions and all questions answered.  AVS to patient via [employer].  Patient will return 9/15/21 for next Xolair injection for next appointment.   Patient discharged in stable condition accompanied by: self.  Departure Mode: Ambulatory.      Mercedes Lane RN

## 2021-09-15 ENCOUNTER — INFUSION THERAPY VISIT (OUTPATIENT)
Dept: INFUSION THERAPY | Facility: CLINIC | Age: 46
End: 2021-09-15
Attending: ALLERGY & IMMUNOLOGY
Payer: COMMERCIAL

## 2021-09-15 VITALS
OXYGEN SATURATION: 96 % | SYSTOLIC BLOOD PRESSURE: 105 MMHG | TEMPERATURE: 98.9 F | RESPIRATION RATE: 16 BRPM | DIASTOLIC BLOOD PRESSURE: 69 MMHG | HEART RATE: 76 BPM

## 2021-09-15 DIAGNOSIS — L50.1 URTICARIA, IDIOPATHIC: Primary | ICD-10-CM

## 2021-09-15 PROCEDURE — 96372 THER/PROPH/DIAG INJ SC/IM: CPT

## 2021-09-15 PROCEDURE — 250N000011 HC RX IP 250 OP 636

## 2021-09-15 RX ADMIN — OMALIZUMAB 300 MG: 150 INJECTION, SOLUTION SUBCUTANEOUS at 10:26

## 2021-09-15 NOTE — PROGRESS NOTES
Infusion Nursing Note:  Holli Thacker presents today for Xolair.    Patient seen by provider today: No   present during visit today: Not Applicable.    Note:  Patient reports is feeling well today.  Patient denies fevers, chills or signs of infection.  Patient denies current issues with hives or rash.  Patient reports her urticaria is well controlled with Xolair.      Intravenous Access:  No Intravenous access/labs at this visit.    Treatment Conditions:  Not Applicable.      Post Infusion Assessment:  Patient tolerated injection without incident.  Patient observed for 30 minutes post Xolair per protocol.       Discharge Plan:   Discharge instructions reviewed with: Patient.  Patient and/or family verbalized understanding of discharge instructions and all questions answered.  Patient discharged in stable condition accompanied by: self.  Departure Mode: Ambulatory.  10/6/21: Xolair.      Kamilla Kaiser RN, BSN, OCN on 9/15/2021 at 11:45 AM

## 2021-10-06 ENCOUNTER — INFUSION THERAPY VISIT (OUTPATIENT)
Dept: INFUSION THERAPY | Facility: CLINIC | Age: 46
End: 2021-10-06
Attending: ALLERGY & IMMUNOLOGY
Payer: COMMERCIAL

## 2021-10-06 VITALS
DIASTOLIC BLOOD PRESSURE: 79 MMHG | OXYGEN SATURATION: 96 % | TEMPERATURE: 99 F | RESPIRATION RATE: 16 BRPM | SYSTOLIC BLOOD PRESSURE: 117 MMHG | HEART RATE: 76 BPM

## 2021-10-06 DIAGNOSIS — L50.1 URTICARIA, IDIOPATHIC: Primary | ICD-10-CM

## 2021-10-06 PROCEDURE — 96372 THER/PROPH/DIAG INJ SC/IM: CPT

## 2021-10-06 PROCEDURE — 250N000011 HC RX IP 250 OP 636

## 2021-10-06 RX ADMIN — OMALIZUMAB 300 MG: 150 INJECTION, SOLUTION SUBCUTANEOUS at 13:02

## 2021-10-06 NOTE — PROGRESS NOTES
Infusion Nursing Note:  Holli ROOSEVELT Thacker presents today for Xolair.    Patient seen by provider today: No   present during visit today: Not Applicable.    Note: N/A.      Intravenous Access:  No Intravenous access/labs at this visit.    Treatment Conditions:  Not Applicable.      Post Infusion Assessment:  Patient tolerated injection without incident.  Patient observed for 30 minutes post xolair per protocol.       Discharge Plan:   Discharge instructions reviewed with: Patient.  Patient and/or family verbalized understanding of discharge instructions and all questions answered.  Copy of AVS reviewed with patient and/or family.  Patient will call to schedule next appt.   Patient discharged in stable condition accompanied by: self.  Departure Mode: Ambulatory.      Valerie Husain RN

## 2021-10-23 ENCOUNTER — HEALTH MAINTENANCE LETTER (OUTPATIENT)
Age: 46
End: 2021-10-23

## 2021-10-29 ENCOUNTER — INFUSION THERAPY VISIT (OUTPATIENT)
Dept: INFUSION THERAPY | Facility: CLINIC | Age: 46
End: 2021-10-29
Attending: ALLERGY & IMMUNOLOGY
Payer: COMMERCIAL

## 2021-10-29 VITALS
TEMPERATURE: 98.4 F | HEART RATE: 81 BPM | RESPIRATION RATE: 16 BRPM | OXYGEN SATURATION: 98 % | SYSTOLIC BLOOD PRESSURE: 109 MMHG | DIASTOLIC BLOOD PRESSURE: 70 MMHG

## 2021-10-29 DIAGNOSIS — L50.1 URTICARIA, IDIOPATHIC: Primary | ICD-10-CM

## 2021-10-29 PROCEDURE — 250N000011 HC RX IP 250 OP 636

## 2021-10-29 PROCEDURE — 96372 THER/PROPH/DIAG INJ SC/IM: CPT

## 2021-10-29 RX ADMIN — OMALIZUMAB 300 MG: 150 INJECTION, SOLUTION SUBCUTANEOUS at 09:04

## 2021-10-29 NOTE — PROGRESS NOTES
Infusion Nursing Note:  Holli ROOSEVELT Linoey Kedar presents today for Xolair subcutaneous injection.    Patient seen by provider today: No   present during visit today: Not Applicable.    Note: N/A.      Intravenous Access:N/A      Treatment Conditions:  Not Applicable.      Post Infusion Assessment:  Patient tolerated injection without incident.. Observed for 30 minutes post injection.  Site patent and intact, free from redness, edema or discomfort.  No evidence of extravasations.       Discharge Plan:   Discharge instructions reviewed with: Patient.  Patient and/or family verbalized understanding of discharge instructions and all questions answered.  AVS to patient via [employer].  Patient will return 11/05/21 for next appointment.   Patient discharged in stable condition accompanied by: self.  Departure Mode: Ambulatory.      Citlaly Cantu RN

## 2021-11-12 ENCOUNTER — INFUSION THERAPY VISIT (OUTPATIENT)
Dept: INFUSION THERAPY | Facility: CLINIC | Age: 46
End: 2021-11-12
Attending: ALLERGY & IMMUNOLOGY
Payer: COMMERCIAL

## 2021-11-12 VITALS
DIASTOLIC BLOOD PRESSURE: 66 MMHG | HEART RATE: 77 BPM | RESPIRATION RATE: 16 BRPM | SYSTOLIC BLOOD PRESSURE: 115 MMHG | TEMPERATURE: 99.1 F | OXYGEN SATURATION: 97 %

## 2021-11-12 DIAGNOSIS — G43.711 CHRONIC MIGRAINE WITHOUT AURA, WITH INTRACTABLE MIGRAINE, SO STATED, WITH STATUS MIGRAINOSUS: ICD-10-CM

## 2021-11-12 DIAGNOSIS — M62.838 SPASM OF MUSCLE: Primary | ICD-10-CM

## 2021-11-12 DIAGNOSIS — G43.111 INTRACTABLE MIGRAINE WITH AURA WITH STATUS MIGRAINOSUS: ICD-10-CM

## 2021-11-12 PROCEDURE — 96365 THER/PROPH/DIAG IV INF INIT: CPT

## 2021-11-12 PROCEDURE — 250N000011 HC RX IP 250 OP 636: Performed by: INTERNAL MEDICINE

## 2021-11-12 PROCEDURE — 258N000003 HC RX IP 258 OP 636: Performed by: INTERNAL MEDICINE

## 2021-11-12 RX ORDER — HEPARIN SODIUM,PORCINE 10 UNIT/ML
5 VIAL (ML) INTRAVENOUS
Status: CANCELLED | OUTPATIENT
Start: 2022-01-18

## 2021-11-12 RX ORDER — LORATADINE 10 MG/1
10 TABLET ORAL ONCE
Status: CANCELLED
Start: 2022-01-18 | End: 2022-01-18

## 2021-11-12 RX ORDER — ACETAMINOPHEN 325 MG/1
650 TABLET ORAL EVERY 4 HOURS PRN
Status: CANCELLED
Start: 2022-01-18

## 2021-11-12 RX ORDER — HEPARIN SODIUM (PORCINE) LOCK FLUSH IV SOLN 100 UNIT/ML 100 UNIT/ML
5 SOLUTION INTRAVENOUS
Status: CANCELLED | OUTPATIENT
Start: 2022-01-18

## 2021-11-12 RX ADMIN — EPTINEZUMAB-JJMR 100 MG: 100 INJECTION INTRAVENOUS at 13:03

## 2021-11-12 RX ADMIN — SODIUM CHLORIDE 250 ML: 9 INJECTION, SOLUTION INTRAVENOUS at 13:05

## 2021-11-12 NOTE — PROGRESS NOTES
Infusion Nursing Note:  Holli ALBERT Garrettgris Thacker presents today for Vyepti.    Patient seen by provider today: No   present during visit today: Not Applicable.    Note:    Patient reports improvement in migraines since starting Vyepti.   PIV flushed with 20cc NS pre and post infusion.    Intravenous Access:  Peripheral IV placed.    Treatment Conditions:  Not Applicable.      Post Infusion Assessment:  Patient tolerated infusion without incident.  Blood return noted pre and post infusion.  Site patent and intact, free from redness, edema or discomfort.  No evidence of extravasations.  Access discontinued per protocol.       Discharge Plan:   AVS to patient via MYCHART.  Patient will return 11/17 for next appointment.   Patient discharged in stable condition accompanied by: self.  Departure Mode: Ambulatory.      Clayton Boles RN

## 2021-11-17 ENCOUNTER — INFUSION THERAPY VISIT (OUTPATIENT)
Dept: INFUSION THERAPY | Facility: CLINIC | Age: 46
End: 2021-11-17
Attending: INTERNAL MEDICINE
Payer: COMMERCIAL

## 2021-11-17 VITALS
DIASTOLIC BLOOD PRESSURE: 78 MMHG | RESPIRATION RATE: 16 BRPM | HEART RATE: 81 BPM | TEMPERATURE: 99.1 F | OXYGEN SATURATION: 99 % | SYSTOLIC BLOOD PRESSURE: 130 MMHG

## 2021-11-17 DIAGNOSIS — L50.1 URTICARIA, IDIOPATHIC: Primary | ICD-10-CM

## 2021-11-17 PROCEDURE — 250N000011 HC RX IP 250 OP 636

## 2021-11-17 PROCEDURE — 96372 THER/PROPH/DIAG INJ SC/IM: CPT

## 2021-11-17 RX ADMIN — OMALIZUMAB 300 MG: 150 INJECTION, SOLUTION SUBCUTANEOUS at 13:11

## 2021-11-17 NOTE — PROGRESS NOTES
Infusion Nursing Note:  Holli Thacker presents today for Xolair.    Patient seen by provider today: No   present during visit today: Not Applicable.    Note: N/A.      Intravenous Access:  No Intravenous access/labs at this visit.    Treatment Conditions:  Not Applicable.      Post Infusion Assessment:  Patient tolerated injection without incident.  Patient observed for 30 minutes post Xolair per protocol.       Discharge Plan:   [facility]  Patient will return 12/8/21 for next appointment.   Patient discharged in stable condition accompanied by: self.  Departure Mode: Ambulatory.      Mena Monte RN

## 2021-12-08 ENCOUNTER — INFUSION THERAPY VISIT (OUTPATIENT)
Dept: INFUSION THERAPY | Facility: CLINIC | Age: 46
End: 2021-12-08
Attending: INTERNAL MEDICINE
Payer: COMMERCIAL

## 2021-12-08 VITALS
RESPIRATION RATE: 16 BRPM | HEART RATE: 72 BPM | TEMPERATURE: 98.5 F | SYSTOLIC BLOOD PRESSURE: 111 MMHG | OXYGEN SATURATION: 97 % | DIASTOLIC BLOOD PRESSURE: 73 MMHG

## 2021-12-08 DIAGNOSIS — L50.1 URTICARIA, IDIOPATHIC: Primary | ICD-10-CM

## 2021-12-08 PROCEDURE — 96372 THER/PROPH/DIAG INJ SC/IM: CPT

## 2021-12-08 PROCEDURE — 250N000011 HC RX IP 250 OP 636

## 2021-12-08 RX ADMIN — OMALIZUMAB 300 MG: 150 INJECTION, SOLUTION SUBCUTANEOUS at 13:08

## 2021-12-08 ASSESSMENT — PAIN SCALES - GENERAL: PAINLEVEL: SEVERE PAIN (6)

## 2021-12-08 NOTE — PROGRESS NOTES
Infusion Nursing Note:  Holli Thacker presents today for Xolair.    Patient seen by provider today: No   present during visit today: Not Applicable.    Note: Patient complaining of a migraine today with some dizziness/virtigo.   States this is not a new symptom for patient.     Intravenous Access:  No Intravenous access/labs at this visit.    Treatment Conditions:  Not Applicable.    Post Infusion Assessment:  Patient tolerated injection without incident.  Patient observed for 30 minutes post Xolair per protocol.  Site patent and intact, free from redness, edema or discomfort.     Discharge Plan:   AVS to patient via MYCHART.  Patient will return 2/14/21 for next appointment.   Patient discharged in stable condition accompanied by: self.  Departure Mode: Ambulatory.      Randa Oliva RN                       No Retinal holes, Tears or Detachments.

## 2022-01-10 ENCOUNTER — INFUSION THERAPY VISIT (OUTPATIENT)
Dept: INFUSION THERAPY | Facility: CLINIC | Age: 47
End: 2022-01-10
Attending: ALLERGY & IMMUNOLOGY
Payer: COMMERCIAL

## 2022-01-10 VITALS
HEART RATE: 93 BPM | SYSTOLIC BLOOD PRESSURE: 125 MMHG | DIASTOLIC BLOOD PRESSURE: 77 MMHG | RESPIRATION RATE: 16 BRPM | OXYGEN SATURATION: 96 % | TEMPERATURE: 99.2 F

## 2022-01-10 DIAGNOSIS — L50.1 URTICARIA, IDIOPATHIC: Primary | ICD-10-CM

## 2022-01-10 LAB — SARS-COV-2 RNA RESP QL NAA+PROBE: NEGATIVE

## 2022-01-10 PROCEDURE — 87635 SARS-COV-2 COVID-19 AMP PRB: CPT | Performed by: INTERNAL MEDICINE

## 2022-01-10 PROCEDURE — 250N000011 HC RX IP 250 OP 636

## 2022-01-10 PROCEDURE — 96372 THER/PROPH/DIAG INJ SC/IM: CPT

## 2022-01-10 RX ADMIN — OMALIZUMAB 300 MG: 150 INJECTION, SOLUTION SUBCUTANEOUS at 16:06

## 2022-01-10 NOTE — PROGRESS NOTES
OK to give Xolair today even though patient lives with a COVID positive person.  [facility], Dr. Carol Dunlap, gave verbal OK to administer Xolair today.  -Carol Song RN    Infusion Nursing Note:  Holli Thacker presents today for Xolair subcutaneous injection.    Patient seen by provider today: No   present during visit today: Not Applicable.    Note: Pt has hives on her left leg-from front to back of thighs goes all the way towards knee.  C/o's pain and itching on the site.  Pt requested to be tested for covid. Covid test done, results still pending. Pt will be called of result.      Intravenous Access:  No Intravenous access/labs at this visit.    Treatment Conditions:  Not Applicable.      Post Infusion Assessment:  Patient tolerated injection without incident.  Patient observed for 30 minutes post injection per protocol.   Site patent and intact, free from redness, edema or discomfort.  No evidence of extravasations.       Discharge Plan:   Discharge instructions reviewed with: Patient.  Patient and/or family verbalized understanding of discharge instructions and all questions answered.  AVS to patient via [employer].  Patient will return 2/14/22 for next appointment.   Patient discharged in stable condition accompanied by: self.  Departure Mode: Ambulatory.      Citlaly Cantu RN                                  '

## 2022-02-07 ENCOUNTER — INFUSION THERAPY VISIT (OUTPATIENT)
Dept: INFUSION THERAPY | Facility: CLINIC | Age: 47
End: 2022-02-07
Attending: ALLERGY & IMMUNOLOGY
Payer: COMMERCIAL

## 2022-02-07 VITALS
SYSTOLIC BLOOD PRESSURE: 135 MMHG | DIASTOLIC BLOOD PRESSURE: 82 MMHG | HEART RATE: 92 BPM | OXYGEN SATURATION: 97 % | TEMPERATURE: 98.4 F

## 2022-02-07 DIAGNOSIS — L50.1 URTICARIA, IDIOPATHIC: Primary | ICD-10-CM

## 2022-02-07 PROCEDURE — 96372 THER/PROPH/DIAG INJ SC/IM: CPT

## 2022-02-07 PROCEDURE — 250N000011 HC RX IP 250 OP 636

## 2022-02-07 RX ADMIN — OMALIZUMAB 300 MG: 150 INJECTION, SOLUTION SUBCUTANEOUS at 09:55

## 2022-02-07 NOTE — PROGRESS NOTES
Infusion Nursing Note:  Holli ROOSEVELT Linoey Kedar presents today for Xolair.    Patient seen by provider today: No   present during visit today: Not Applicable.    Note: N/A.      Intravenous Access:  No Intravenous access/labs at this visit.    Treatment Conditions:  Not Applicable.      Post Infusion Assessment:  Patient tolerated injection without incident.  Patient observed for 30 minutes post Xolair per protocol.  Site patent and intact, free from redness, edema or discomfort.       Discharge Plan:   AVS to patient via MYCHART.  Patient will return 2/18/22 for next Xolair injection for next appointment.   Patient discharged in stable condition accompanied by: self.  Departure Mode: Ambulatory.      Mercedes Lane RN

## 2022-02-12 ENCOUNTER — HEALTH MAINTENANCE LETTER (OUTPATIENT)
Age: 47
End: 2022-02-12

## 2022-02-14 ENCOUNTER — HOSPITAL ENCOUNTER (EMERGENCY)
Facility: CLINIC | Age: 47
Discharge: HOME | End: 2022-02-14
Attending: INTERNAL MEDICINE | Admitting: INTERNAL MEDICINE
Payer: COMMERCIAL

## 2022-02-14 ENCOUNTER — APPOINTMENT (OUTPATIENT)
Dept: MRI IMAGING | Facility: CLINIC | Age: 47
End: 2022-02-14
Attending: INTERNAL MEDICINE
Payer: COMMERCIAL

## 2022-02-14 VITALS
OXYGEN SATURATION: 95 % | TEMPERATURE: 97.8 F | SYSTOLIC BLOOD PRESSURE: 117 MMHG | HEART RATE: 85 BPM | DIASTOLIC BLOOD PRESSURE: 66 MMHG | RESPIRATION RATE: 18 BRPM

## 2022-02-14 DIAGNOSIS — G43.109 COMPLICATED MIGRAINE: ICD-10-CM

## 2022-02-14 LAB
HOLD SPECIMEN: NORMAL
HOLD SPECIMEN: NORMAL

## 2022-02-14 PROCEDURE — 250N000009 HC RX 250: Performed by: INTERNAL MEDICINE

## 2022-02-14 PROCEDURE — 99285 EMERGENCY DEPT VISIT HI MDM: CPT | Mod: 25

## 2022-02-14 PROCEDURE — 250N000011 HC RX IP 250 OP 636: Performed by: INTERNAL MEDICINE

## 2022-02-14 PROCEDURE — 96376 TX/PRO/DX INJ SAME DRUG ADON: CPT

## 2022-02-14 PROCEDURE — 96361 HYDRATE IV INFUSION ADD-ON: CPT

## 2022-02-14 PROCEDURE — 96365 THER/PROPH/DIAG IV INF INIT: CPT

## 2022-02-14 PROCEDURE — 70551 MRI BRAIN STEM W/O DYE: CPT

## 2022-02-14 PROCEDURE — 258N000003 HC RX IP 258 OP 636: Performed by: INTERNAL MEDICINE

## 2022-02-14 PROCEDURE — 96375 TX/PRO/DX INJ NEW DRUG ADDON: CPT

## 2022-02-14 RX ORDER — LORAZEPAM 2 MG/ML
1 INJECTION INTRAMUSCULAR
Status: COMPLETED | OUTPATIENT
Start: 2022-02-14 | End: 2022-02-14

## 2022-02-14 RX ORDER — ONDANSETRON 2 MG/ML
4 INJECTION INTRAMUSCULAR; INTRAVENOUS ONCE
Status: COMPLETED | OUTPATIENT
Start: 2022-02-14 | End: 2022-02-14

## 2022-02-14 RX ORDER — METHYLPREDNISOLONE SODIUM SUCCINATE 125 MG/2ML
125 INJECTION, POWDER, LYOPHILIZED, FOR SOLUTION INTRAMUSCULAR; INTRAVENOUS ONCE
Status: COMPLETED | OUTPATIENT
Start: 2022-02-14 | End: 2022-02-14

## 2022-02-14 RX ORDER — METOCLOPRAMIDE HYDROCHLORIDE 5 MG/ML
10 INJECTION INTRAMUSCULAR; INTRAVENOUS ONCE
Status: DISCONTINUED | OUTPATIENT
Start: 2022-02-14 | End: 2022-02-14 | Stop reason: HOSPADM

## 2022-02-14 RX ORDER — KETOROLAC TROMETHAMINE 15 MG/ML
15 INJECTION, SOLUTION INTRAMUSCULAR; INTRAVENOUS ONCE
Status: COMPLETED | OUTPATIENT
Start: 2022-02-14 | End: 2022-02-14

## 2022-02-14 RX ORDER — DIPHENHYDRAMINE HYDROCHLORIDE 50 MG/ML
50 INJECTION INTRAMUSCULAR; INTRAVENOUS ONCE
Status: DISCONTINUED | OUTPATIENT
Start: 2022-02-14 | End: 2022-02-14 | Stop reason: HOSPADM

## 2022-02-14 RX ORDER — LORAZEPAM 2 MG/ML
1 INJECTION INTRAMUSCULAR ONCE
Status: COMPLETED | OUTPATIENT
Start: 2022-02-14 | End: 2022-02-14

## 2022-02-14 RX ADMIN — ONDANSETRON 4 MG: 2 INJECTION INTRAMUSCULAR; INTRAVENOUS at 15:56

## 2022-02-14 RX ADMIN — LORAZEPAM 1 MG: 2 INJECTION INTRAMUSCULAR; INTRAVENOUS at 15:57

## 2022-02-14 RX ADMIN — SODIUM CHLORIDE 1000 ML: 9 INJECTION, SOLUTION INTRAVENOUS at 15:56

## 2022-02-14 RX ADMIN — KETOROLAC TROMETHAMINE 15 MG: 15 INJECTION, SOLUTION INTRAMUSCULAR; INTRAVENOUS at 15:56

## 2022-02-14 RX ADMIN — LORAZEPAM 1 MG: 2 INJECTION INTRAMUSCULAR; INTRAVENOUS at 17:46

## 2022-02-14 RX ADMIN — KETOROLAC TROMETHAMINE 15 MG: 15 INJECTION, SOLUTION INTRAMUSCULAR; INTRAVENOUS at 17:02

## 2022-02-14 RX ADMIN — METHYLPREDNISOLONE SODIUM SUCCINATE 125 MG: 125 INJECTION, POWDER, FOR SOLUTION INTRAMUSCULAR; INTRAVENOUS at 17:02

## 2022-02-14 RX ADMIN — VALPROATE SODIUM 1000 MG: 100 INJECTION, SOLUTION INTRAVENOUS at 18:23

## 2022-02-14 ASSESSMENT — ENCOUNTER SYMPTOMS
HEADACHES: 1
FACIAL ASYMMETRY: 1
NAUSEA: 1

## 2022-02-14 NOTE — ED PROVIDER NOTES
History   Chief Complaint:  Headache       HPI   Holli Thacker is a 46 year old female with history of chronic migraines who presents with a prolonged migrane.  She says this was started 2 weeks ago and she has been unable to control it with her medications at home.  She also endorses nausea and left sided facial droop.  Patient indicates that it is typical for her to develop some left-sided facial droop when she has a prolonged headache.  She follows with [facility] of neurology.  She says that usually when she gets a bad headache she is able to go there for an infusion but they were unable to see her today.  She knows they give her a cocktail of medications when she comes but is unsure of the ingredients.    Review of Systems   Gastrointestinal: Positive for nausea.   Neurological: Positive for facial asymmetry and headaches.   All other systems reviewed and are negative.    Allergies:  Azithromycin  Erythromycin  Sertraline  Verapamil    Medications:  Zyrtec  Advair  Albuterol  Singular  Xolair  Protonix  lyrical  Spiriva  Rivoq  Effexor  Prednisone       Past Medical History:     Anxiety  Arnold chiari malformation type 1  Depressive disorder   Fibromyalgia    rheumatoid arthritis  Asthma  Chronic migraine   TMJ  GERD  Tobacco abuse   Chronic urticaria  Chronic vertigo     Past Surgical History:    Laparoscopic cystectomy ovarian   Tubal ligation   section  Salpingo oophorectomy  D&C    Family History:    Brother: asthma  Father; heart disease, hypertension  Mother: asthma. Hyperlipidemia     Social History:  Marital status:   History of tobacco use  Presents unaccompanied     Physical Exam     Patient Vitals for the past 24 hrs:   BP Temp Temp src Pulse Resp SpO2   22 1815 -- -- -- -- -- 96 %   22 1645 117/66 -- -- 85 -- 96 %   22 1630 113/68 -- -- 83 -- 95 %   22 1615 109/72 -- -- 83 -- 94 %   22 1600 120/77 -- -- 74 -- 98 %   22 1545 118/77 -- --  80 -- 96 %   02/14/22 1530 105/54 -- -- 74 -- 96 %   02/14/22 1515 (!) 107/91 -- -- 72 -- 96 %   02/14/22 1331 115/69 97.8  F (36.6  C) Temporal 64 18 99 %       Physical Exam  Constitutional:       Comments: Pleasant and cooperative   HENT:      Mouth/Throat:      Pharynx: No posterior oropharyngeal erythema.   Eyes:      Conjunctiva/sclera: Conjunctivae normal.   Cardiovascular:      Rate and Rhythm: Normal rate and regular rhythm.      Heart sounds: Normal heart sounds.   Pulmonary:      Effort: Pulmonary effort is normal.      Breath sounds: Normal breath sounds.   Abdominal:      General: Bowel sounds are normal. There is no distension.      Palpations: Abdomen is soft.      Tenderness: There is no abdominal tenderness. There is no guarding or rebound.   Musculoskeletal:         General: Normal range of motion.      Cervical back: Neck supple.   Skin:     General: Skin is warm and dry.   Neurological:      Mental Status: She is alert.      Cranial Nerves: Cranial nerve deficit present.      Comments: Mild asymmetry with slightly less motion on the left side of the face         Emergency Department Course     Imaging:  MR Brain w/o Contrast   Final Result   IMPRESSION:   1.  No acute infarct, mass, mass effect, or hemorrhage.   2.  Mild degree of Chiari I malformation.        Report per radiology       Emergency Department Course:  Reviewed:  I reviewed nursing notes, vitals, past medical history and Care Everywhere    Assessments:  1457 I obtained history and examined the patient as noted above.     Consults:  2031 I spoke with  of [facility] regarding patient's presentation, findings, and plan of care.      Interventions:    Medications   metoclopramide (REGLAN) injection 10 mg (10 mg Intravenous Not Given 2/14/22 1992)   diphenhydrAMINE (BENADRYL) injection 50 mg (50 mg Intravenous Not Given 2/14/22 4069)   valproate (DEPACON) 1,000 mg in sodium chloride 0.9 % 50 mL intermittent infusion (1,000  mg Intravenous New Bag 2/14/22 1823)   0.9% sodium chloride BOLUS (0 mLs Intravenous Stopped 2/14/22 1824)   ketorolac (TORADOL) injection 15 mg (15 mg Intravenous Given 2/14/22 1556)   LORazepam (ATIVAN) injection 1 mg (1 mg Intravenous Given 2/14/22 1557)   ondansetron (ZOFRAN) injection 4 mg (4 mg Intravenous Given 2/14/22 1556)   methylPREDNISolone sodium succinate (solu-MEDROL) injection 125 mg (125 mg Intravenous Given 2/14/22 1702)   ketorolac (TORADOL) injection 15 mg (15 mg Intravenous Given 2/14/22 1702)   LORazepam (ATIVAN) injection 1 mg (1 mg Intravenous Given 2/14/22 1746)       Disposition:  The patient was discharged to home.     Impression & Plan   Medical Decision Making:    Holli Thacker is a 46 year old female who presents to the emergency department complaining of a migraine with left-sided facial droop.  As noted she indicates that she has developed the same neurologic symptoms in the past with migraines.  I did discuss the case with her primary neurologist who noted that it has been since 2012 when she last had an MRI scan.  He thought getting another MRI today would be appropriate.  Fortunately this shows no new abnormalities.  Patient noted that her previous MRI scan was through [facility] and she was hoping the 2 could be directly compared.  We will burn a disc for her to take with her.  I was able to get a description of her usual cocktail which ordered high doses of medications.  That was given here and the patient did note relief of symptoms.  I will discharge her home for continued follow-up with her neurologist.    Diagnosis:    ICD-10-CM    1. Complicated migraine  G43.109        Discharge Medications:  New Prescriptions    No medications on file       Scribe Disclosure:  I, Randy Quan, am serving as a scribe at 2:54 PM on 2/14/2022 to document services personally performed by Berta Baird MD based on my observations and the provider's statements to me.          Jt  Berta Adames MD  02/14/22 9438

## 2022-02-14 NOTE — ED TRIAGE NOTES
2 week hx of migraines with photophobia and sensitivity to sound. Pt hs taken her migraine medication without relief. Pt also states L sided facial drooping. Pt reports sx prior with previous migraines. ABC in tact. A/OX4

## 2022-02-18 ENCOUNTER — INFUSION THERAPY VISIT (OUTPATIENT)
Dept: INFUSION THERAPY | Facility: CLINIC | Age: 47
End: 2022-02-18
Attending: ALLERGY & IMMUNOLOGY
Payer: COMMERCIAL

## 2022-02-18 VITALS
TEMPERATURE: 97.7 F | HEART RATE: 88 BPM | RESPIRATION RATE: 16 BRPM | DIASTOLIC BLOOD PRESSURE: 77 MMHG | SYSTOLIC BLOOD PRESSURE: 122 MMHG | OXYGEN SATURATION: 96 %

## 2022-02-18 DIAGNOSIS — M62.838 SPASM OF MUSCLE: Primary | ICD-10-CM

## 2022-02-18 DIAGNOSIS — G43.711 CHRONIC MIGRAINE WITHOUT AURA, WITH INTRACTABLE MIGRAINE, SO STATED, WITH STATUS MIGRAINOSUS: ICD-10-CM

## 2022-02-18 DIAGNOSIS — G43.111 INTRACTABLE MIGRAINE WITH AURA WITH STATUS MIGRAINOSUS: ICD-10-CM

## 2022-02-18 PROCEDURE — 96365 THER/PROPH/DIAG IV INF INIT: CPT

## 2022-02-18 PROCEDURE — 250N000011 HC RX IP 250 OP 636: Performed by: INTERNAL MEDICINE

## 2022-02-18 PROCEDURE — 258N000003 HC RX IP 258 OP 636: Performed by: INTERNAL MEDICINE

## 2022-02-18 RX ORDER — LORATADINE 10 MG/1
10 TABLET ORAL ONCE
Status: CANCELLED
Start: 2022-05-11 | End: 2022-05-11

## 2022-02-18 RX ORDER — HEPARIN SODIUM (PORCINE) LOCK FLUSH IV SOLN 100 UNIT/ML 100 UNIT/ML
5 SOLUTION INTRAVENOUS
Status: CANCELLED | OUTPATIENT
Start: 2022-05-11

## 2022-02-18 RX ORDER — ACETAMINOPHEN 325 MG/1
650 TABLET ORAL EVERY 4 HOURS PRN
Status: CANCELLED
Start: 2022-05-11

## 2022-02-18 RX ORDER — HEPARIN SODIUM,PORCINE 10 UNIT/ML
5 VIAL (ML) INTRAVENOUS
Status: CANCELLED | OUTPATIENT
Start: 2022-05-11

## 2022-02-18 RX ADMIN — SODIUM CHLORIDE 250 ML: 9 INJECTION, SOLUTION INTRAVENOUS at 14:30

## 2022-02-18 RX ADMIN — EPTINEZUMAB-JJMR 100 MG: 100 INJECTION INTRAVENOUS at 14:36

## 2022-02-18 NOTE — PROGRESS NOTES
Infusion Nursing Note:  Holli Thacker presents today for Q 3 month Vyepti.    Patient seen by provider today: No   present during visit today: Not Applicable.    Note:  Patient reports is feeling fine today.  Patient denies fevers, chills or signs of infection.  Pain:  Patient reports migraine today and rates as 2/10.  Patient reports that the Vyepti does help to control her migraines, but does notice increase in migraines 1-2 weeks prior to her next dose of Vyepti.  Patient was at [facility] ED on 2/14/22 due to her migraines. Vertigo:  Patient reports issues with vertigo associated with her migraines.    PIV line flushed with NS for 6 minutes prior to starting Vyepti infusion, then flushed with NS primary for 9 minutes post completion of Vyepti.      Intravenous Access:  Peripheral IV placed.  PIV site C/D/I.  PIV flushes well + excellent blood return.    Treatment Conditions:  Not Applicable.      Post Infusion Assessment:  Patient tolerated infusion without incident.  Site patent and intact, free from redness, edema or discomfort.  No evidence of extravasations.  Access discontinued per protocol.       Discharge Plan:   Discharge instructions reviewed with: Patient.  Patient and/or family verbalized understanding of discharge instructions and all questions answered.  Patient discharged in stable condition accompanied by: self.  Departure Mode: Ambulatory.  2/25/22: Xolair.  Patient will schedule next appointment for Vyepti.    Kamilla Kaiser RN, BSN, OCN on 2/18/2022 at 2:45 PM

## 2022-03-18 ENCOUNTER — INFUSION THERAPY VISIT (OUTPATIENT)
Dept: INFUSION THERAPY | Facility: CLINIC | Age: 47
End: 2022-03-18
Attending: ALLERGY & IMMUNOLOGY
Payer: COMMERCIAL

## 2022-03-18 VITALS
TEMPERATURE: 98.6 F | SYSTOLIC BLOOD PRESSURE: 125 MMHG | DIASTOLIC BLOOD PRESSURE: 84 MMHG | OXYGEN SATURATION: 96 % | HEART RATE: 85 BPM | RESPIRATION RATE: 16 BRPM

## 2022-03-18 DIAGNOSIS — L50.1 URTICARIA, IDIOPATHIC: Primary | ICD-10-CM

## 2022-03-18 PROCEDURE — 250N000011 HC RX IP 250 OP 636

## 2022-03-18 PROCEDURE — 96372 THER/PROPH/DIAG INJ SC/IM: CPT

## 2022-03-18 RX ADMIN — OMALIZUMAB 300 MG: 150 INJECTION, SOLUTION SUBCUTANEOUS at 13:09

## 2022-03-18 NOTE — PROGRESS NOTES
Infusion Nursing Note:  Holli Thacker presents today for Xolair.    Patient seen by provider today: No   present during visit today: Not Applicable.    Note: Patient states she can tell she is due for xolair because of increased rash in groin.      Intravenous Access:  No Intravenous access/labs at this visit.    Treatment Conditions:  Not Applicable.      Post Infusion Assessment:  Patient tolerated injection without incident.  Patient observed for 30 minutes post xolair per protocol.       Discharge Plan:   [facility]  Patient will return 4/8/22 for next appointment.   Patient discharged in stable condition accompanied by: self.  Departure Mode: Ambulatory.      Mena Monte RN

## 2022-04-08 ENCOUNTER — INFUSION THERAPY VISIT (OUTPATIENT)
Dept: INFUSION THERAPY | Facility: CLINIC | Age: 47
End: 2022-04-08
Attending: ALLERGY & IMMUNOLOGY
Payer: COMMERCIAL

## 2022-04-08 VITALS
SYSTOLIC BLOOD PRESSURE: 115 MMHG | RESPIRATION RATE: 20 BRPM | OXYGEN SATURATION: 96 % | HEART RATE: 85 BPM | DIASTOLIC BLOOD PRESSURE: 65 MMHG | TEMPERATURE: 98.1 F

## 2022-04-08 DIAGNOSIS — L50.1 URTICARIA, IDIOPATHIC: Primary | ICD-10-CM

## 2022-04-08 PROCEDURE — 250N000011 HC RX IP 250 OP 636

## 2022-04-08 PROCEDURE — 96372 THER/PROPH/DIAG INJ SC/IM: CPT

## 2022-04-08 RX ADMIN — OMALIZUMAB 300 MG: 150 INJECTION, SOLUTION SUBCUTANEOUS at 09:11

## 2022-04-08 ASSESSMENT — PAIN SCALES - GENERAL: PAINLEVEL: SEVERE PAIN (6)

## 2022-04-08 NOTE — PROGRESS NOTES
Infusion Nursing Note:  Holli ROOSEVELT Thacker presents today for Xolair.    Patient seen by provider today: No   present during visit today: Not Applicable.    Note: Reports no changes in home medications.     Intravenous Access:  No Intravenous access/labs at this visit.    Treatment Conditions:  Not Applicable.    Post Infusion Assessment:  Patient tolerated injection without incident.  Patient observed for 30 minutes post Xoliar per protocol.  Site patent and intact, free from redness, edema or discomfort.     Discharge Plan:   AVS to patient via [employer].  Patient will return 4/29/22 for next appointment.   Patient discharged in stable condition accompanied by: self.  Departure Mode: Ambulatory.      Randa Oliva RN

## 2022-04-09 ENCOUNTER — HEALTH MAINTENANCE LETTER (OUTPATIENT)
Age: 47
End: 2022-04-09

## 2022-04-29 ENCOUNTER — INFUSION THERAPY VISIT (OUTPATIENT)
Dept: INFUSION THERAPY | Facility: CLINIC | Age: 47
End: 2022-04-29
Attending: ALLERGY & IMMUNOLOGY
Payer: COMMERCIAL

## 2022-04-29 VITALS
OXYGEN SATURATION: 96 % | DIASTOLIC BLOOD PRESSURE: 72 MMHG | HEART RATE: 76 BPM | SYSTOLIC BLOOD PRESSURE: 110 MMHG | TEMPERATURE: 98.5 F | RESPIRATION RATE: 18 BRPM

## 2022-04-29 DIAGNOSIS — L50.1 URTICARIA, IDIOPATHIC: Primary | ICD-10-CM

## 2022-04-29 PROCEDURE — 96372 THER/PROPH/DIAG INJ SC/IM: CPT

## 2022-04-29 PROCEDURE — 250N000011 HC RX IP 250 OP 636

## 2022-04-29 RX ADMIN — OMALIZUMAB 300 MG: 150 INJECTION, SOLUTION SUBCUTANEOUS at 11:15

## 2022-04-29 NOTE — PROGRESS NOTES
Infusion Nursing Note:  Holli ROOSEVELT Linoey Kedar presents today for Xolair.    Patient seen by provider today: No   present during visit today: Not Applicable.    Note: chronic cough, otherwise asthma symptoms stable.      Intravenous Access:  No Intravenous access/labs at this visit.    Treatment Conditions:  Not Applicable.      Post Infusion Assessment:  Patient tolerated injection without incident.  Patient observed for 30 minutes post Xolair per protocol.  Site patent and intact, free from redness, edema or discomfort.       Discharge Plan:   [facility]  Patient will return 5/20 for next appointment.   Patient discharged in stable condition accompanied by: self.  Departure Mode: Ambulatory.      Clayton Boles RN

## 2022-05-20 ENCOUNTER — INFUSION THERAPY VISIT (OUTPATIENT)
Dept: INFUSION THERAPY | Facility: CLINIC | Age: 47
End: 2022-05-20
Attending: ALLERGY & IMMUNOLOGY
Payer: COMMERCIAL

## 2022-05-20 VITALS
OXYGEN SATURATION: 95 % | TEMPERATURE: 99.3 F | HEART RATE: 92 BPM | SYSTOLIC BLOOD PRESSURE: 114 MMHG | DIASTOLIC BLOOD PRESSURE: 74 MMHG

## 2022-05-20 DIAGNOSIS — L50.1 URTICARIA, IDIOPATHIC: Primary | ICD-10-CM

## 2022-05-20 PROCEDURE — 250N000011 HC RX IP 250 OP 636

## 2022-05-20 PROCEDURE — 96372 THER/PROPH/DIAG INJ SC/IM: CPT

## 2022-05-20 RX ADMIN — OMALIZUMAB 300 MG: 150 INJECTION, SOLUTION SUBCUTANEOUS at 13:04

## 2022-05-20 NOTE — PROGRESS NOTES
Infusion Nursing Note:  Holli ROOSEVELT Thacker presents today for Xolair.    Patient seen by provider today: No   present during visit today: Not Applicable.    Note: N/A.      Intravenous Access:  No Intravenous access/labs at this visit.    Treatment Conditions:  Not Applicable.      Post Infusion Assessment:  Patient tolerated injection without incident.  Patient observed for 30 minutes post Xolair per protocol.  No evidence of extravasations.       Discharge Plan:   Discharge instructions reviewed with: Patient.  Patient and/or family verbalized understanding of discharge instructions and all questions answered.  AVS to patient via [employer].  Patient will return 5/27 for next appointment.   Patient discharged in stable condition accompanied by: self.  Departure Mode: Ambulatory.      Geovanna Swann RN

## 2022-05-26 ENCOUNTER — APPOINTMENT (OUTPATIENT)
Dept: CT IMAGING | Facility: CLINIC | Age: 47
End: 2022-05-26
Attending: EMERGENCY MEDICINE
Payer: COMMERCIAL

## 2022-05-26 ENCOUNTER — HOSPITAL ENCOUNTER (EMERGENCY)
Facility: CLINIC | Age: 47
Discharge: HOME | End: 2022-05-26
Attending: EMERGENCY MEDICINE | Admitting: EMERGENCY MEDICINE
Payer: COMMERCIAL

## 2022-05-26 VITALS
OXYGEN SATURATION: 99 % | TEMPERATURE: 97.3 F | RESPIRATION RATE: 18 BRPM | HEART RATE: 83 BPM | DIASTOLIC BLOOD PRESSURE: 53 MMHG | SYSTOLIC BLOOD PRESSURE: 105 MMHG

## 2022-05-26 DIAGNOSIS — K44.9 HIATAL HERNIA: ICD-10-CM

## 2022-05-26 DIAGNOSIS — R10.84 ABDOMINAL PAIN, GENERALIZED: ICD-10-CM

## 2022-05-26 LAB
ALBUMIN SERPL-MCNC: 3.9 G/DL (ref 3.4–5)
ALP SERPL-CCNC: 115 U/L (ref 40–150)
ALT SERPL W P-5'-P-CCNC: 26 U/L (ref 0–50)
ANION GAP SERPL CALCULATED.3IONS-SCNC: 3 MMOL/L (ref 3–14)
AST SERPL W P-5'-P-CCNC: 23 U/L (ref 0–45)
BASOPHILS # BLD AUTO: 0 10E3/UL (ref 0–0.2)
BASOPHILS NFR BLD AUTO: 1 %
BILIRUB SERPL-MCNC: 0.3 MG/DL (ref 0.2–1.3)
BUN SERPL-MCNC: 14 MG/DL (ref 7–30)
CALCIUM SERPL-MCNC: 9.2 MG/DL (ref 8.5–10.1)
CHLORIDE BLD-SCNC: 109 MMOL/L (ref 94–109)
CO2 SERPL-SCNC: 27 MMOL/L (ref 20–32)
CREAT SERPL-MCNC: 0.85 MG/DL (ref 0.52–1.04)
EOSINOPHIL # BLD AUTO: 0.1 10E3/UL (ref 0–0.7)
EOSINOPHIL NFR BLD AUTO: 3 %
ERYTHROCYTE [DISTWIDTH] IN BLOOD BY AUTOMATED COUNT: 14.1 % (ref 10–15)
GFR SERPL CREATININE-BSD FRML MDRD: 85 ML/MIN/1.73M2
GLUCOSE BLD-MCNC: 93 MG/DL (ref 70–99)
HCG SER QL IA.RAPID: NEGATIVE
HCT VFR BLD AUTO: 41.7 % (ref 35–47)
HGB BLD-MCNC: 13.5 G/DL (ref 11.7–15.7)
IMM GRANULOCYTES # BLD: 0 10E3/UL
IMM GRANULOCYTES NFR BLD: 0 %
LYMPHOCYTES # BLD AUTO: 1.9 10E3/UL (ref 0.8–5.3)
LYMPHOCYTES NFR BLD AUTO: 43 %
MCH RBC QN AUTO: 31.8 PG (ref 26.5–33)
MCHC RBC AUTO-ENTMCNC: 32.4 G/DL (ref 31.5–36.5)
MCV RBC AUTO: 98 FL (ref 78–100)
MONOCYTES # BLD AUTO: 0.4 10E3/UL (ref 0–1.3)
MONOCYTES NFR BLD AUTO: 8 %
NEUTROPHILS # BLD AUTO: 2 10E3/UL (ref 1.6–8.3)
NEUTROPHILS NFR BLD AUTO: 45 %
NRBC # BLD AUTO: 0 10E3/UL
NRBC BLD AUTO-RTO: 0 /100
PLATELET # BLD AUTO: 304 10E3/UL (ref 150–450)
POTASSIUM BLD-SCNC: 3.9 MMOL/L (ref 3.4–5.3)
PROT SERPL-MCNC: 7.6 G/DL (ref 6.8–8.8)
RBC # BLD AUTO: 4.24 10E6/UL (ref 3.8–5.2)
SODIUM SERPL-SCNC: 139 MMOL/L (ref 133–144)
WBC # BLD AUTO: 4.5 10E3/UL (ref 4–11)

## 2022-05-26 PROCEDURE — 80053 COMPREHEN METABOLIC PANEL: CPT | Performed by: EMERGENCY MEDICINE

## 2022-05-26 PROCEDURE — 96374 THER/PROPH/DIAG INJ IV PUSH: CPT | Mod: 59

## 2022-05-26 PROCEDURE — 74177 CT ABD & PELVIS W/CONTRAST: CPT

## 2022-05-26 PROCEDURE — 250N000009 HC RX 250: Performed by: EMERGENCY MEDICINE

## 2022-05-26 PROCEDURE — 250N000011 HC RX IP 250 OP 636: Performed by: EMERGENCY MEDICINE

## 2022-05-26 PROCEDURE — 85025 COMPLETE CBC W/AUTO DIFF WBC: CPT | Performed by: EMERGENCY MEDICINE

## 2022-05-26 PROCEDURE — 84702 CHORIONIC GONADOTROPIN TEST: CPT

## 2022-05-26 PROCEDURE — 36415 COLL VENOUS BLD VENIPUNCTURE: CPT | Performed by: EMERGENCY MEDICINE

## 2022-05-26 PROCEDURE — 99285 EMERGENCY DEPT VISIT HI MDM: CPT | Mod: 25

## 2022-05-26 PROCEDURE — 96375 TX/PRO/DX INJ NEW DRUG ADDON: CPT

## 2022-05-26 RX ORDER — HYDROCODONE BITARTRATE AND ACETAMINOPHEN 5; 325 MG/1; MG/1
1 TABLET ORAL EVERY 6 HOURS PRN
Qty: 10 TABLET | Refills: 0 | Status: SHIPPED | OUTPATIENT
Start: 2022-05-26 | End: 2022-05-29

## 2022-05-26 RX ORDER — ONDANSETRON 2 MG/ML
4 INJECTION INTRAMUSCULAR; INTRAVENOUS ONCE
Status: COMPLETED | OUTPATIENT
Start: 2022-05-26 | End: 2022-05-26

## 2022-05-26 RX ORDER — IOPAMIDOL 755 MG/ML
500 INJECTION, SOLUTION INTRAVASCULAR ONCE
Status: COMPLETED | OUTPATIENT
Start: 2022-05-26 | End: 2022-05-26

## 2022-05-26 RX ORDER — HYDROMORPHONE HYDROCHLORIDE 1 MG/ML
0.5 INJECTION, SOLUTION INTRAMUSCULAR; INTRAVENOUS; SUBCUTANEOUS
Status: DISCONTINUED | OUTPATIENT
Start: 2022-05-26 | End: 2022-05-26 | Stop reason: HOSPADM

## 2022-05-26 RX ADMIN — ONDANSETRON 4 MG: 2 INJECTION INTRAMUSCULAR; INTRAVENOUS at 12:37

## 2022-05-26 RX ADMIN — SODIUM CHLORIDE 64 ML: 9 INJECTION, SOLUTION INTRAVENOUS at 13:12

## 2022-05-26 RX ADMIN — IOPAMIDOL 96 ML: 755 INJECTION, SOLUTION INTRAVENOUS at 13:12

## 2022-05-26 RX ADMIN — HYDROMORPHONE HYDROCHLORIDE 1 MG: 1 INJECTION, SOLUTION INTRAMUSCULAR; INTRAVENOUS; SUBCUTANEOUS at 12:38

## 2022-05-26 ASSESSMENT — ENCOUNTER SYMPTOMS
VOMITING: 0
SHORTNESS OF BREATH: 1
ABDOMINAL PAIN: 1
CONSTIPATION: 0
NAUSEA: 1

## 2022-05-26 NOTE — ED PROVIDER NOTES
History   Chief Complaint:  Hernia       HPI   Holli Thacker is a 46 year old female with history of ovarian cysts who presents with abdominal pain. She states that she was seen by her general surgeon, Dr. Andres, for a consultation regarding her sliding hiatal hernia. Today, patient reports pain across lower abdomen accompanied with labored breathing (2/2 the severity of the pain) and nausea. No vomiting or constipation was reported. Pain is exacerbated with pressure on the abdomen.      Review of Systems   Respiratory: Positive for shortness of breath (seconday to pain).    Gastrointestinal: Positive for abdominal pain and nausea. Negative for constipation and vomiting.   All other systems reviewed and are negative.    Allergies:  Azithromycin  Erythromycin  Sertraline  Verapamil    Medications:  Advair Diskus  Rinvoq  Albuterol Sulfate HFA  Duoneb  Cyclobenzaprine  Montelukast  Xolair  Pantoprazole  Pregabalin  Spirva  Respimat  Venlafaxine    Past Medical History:     TMJ  Chronic GERD  Tobacco abuse  Chronic urticaria  Chronic vertigo  Rheumatoid arthritis  Migraine  Fibromyalgia  Eczema  Arnold-Chiari malformation  Sinusitis  Major depressive disorder  Anxiety  Asthma  Cluster headaches  Ovarian cyst  Obstructive Sleep Apnea    Past Surgical History:    Pelvic laparoscopy  Forceps assist vaginal delivery  Dilation and Curettage  IMO dental surgery    section  Tubal ligation  Salpingo-oophorectomy  Laparoscopic Cystectomy Ovarian  Laparoscopy diagnostic  Ovary surgery    Family History:    Brother: asthma, GI disease, alcohol abuse  Daughter: depression   Father: Alcohol/drug, heart disease, HTN,   Mother: asthma, GI disease, hyperlipidemia, anxiety, migraines, osteoporosis  Son: depression    Social History:  The patient presents to the ED alone.     Physical Exam     Patient Vitals for the past 24 hrs:   BP Temp Temp src Pulse Resp SpO2   22 1246 -- -- -- -- -- 97 %   22 1245  104/61 -- -- 91 -- --   05/26/22 1209 114/67 97.3  F (36.3  C) Temporal 82 18 99 %       Physical Exam  General/Appearance: appears stated age, well-groomed, appears uncomfortable  Eyes: EOMI, no scleral injection, no icterus  ENT: MMM  Neck: supple, nl ROM, no stiffness  Cardiovascular: RRR, nl S1S2, no m/r/g, 2+ pulses in all 4 extremities, cap refill <2sec  Respiratory: CTAB, good air movement throughout, no wheezes/rhonchi/rales, no increased WOB, no retractions  GI: abd soft, non-distended, diffuse lower abd ttp,  no HSM, no rebound, no guarding, nl BS  MSK: ROMERO, good tone, no bony abnormality  Skin: warm and well-perfused, no rash, no edema, no ecchymosis, nl turgor  Neuro: GCS 15, alert and oriented, no gross focal neuro deficits  Psych: interacts appropriately  Heme: no petechia, no purpura, no active bleeding    Emergency Department Course     Imaging:  CT Abdomen Pelvis w Contrast   Final Result   IMPRESSION:    1.  No acute findings in the abdomen and pelvis.   2.  Stable partial bowel malrotation.   3.  Stable moderate hiatal hernia.   4.  Stable nonobstructing 4 mm right renal calculus and duplicated   right renal collecting system.      VANNESSA DEY MD            SYSTEM ID:  [id number]        Report per radiology    Laboratory:  Labs Ordered and Resulted from Time of ED Arrival to Time of ED Departure   COMPREHENSIVE METABOLIC PANEL - Normal       Result Value    Sodium 139      Potassium 3.9      Chloride 109      Carbon Dioxide (CO2) 27      Anion Gap 3      Urea Nitrogen 14      Creatinine 0.85      Calcium 9.2      Glucose 93      Alkaline Phosphatase 115      AST 23      ALT 26      Protein Total 7.6      Albumin 3.9      Bilirubin Total 0.3      GFR Estimate 85     ISTAT HCG QUALITATIVE PREGNANCY POCT - Normal    HCG Qualitative POCT Negative     CBC WITH PLATELETS AND DIFFERENTIAL    WBC Count 4.5      RBC Count 4.24      Hemoglobin 13.5      Hematocrit 41.7      MCV 98      MCH 31.8      MCHC  32.4      RDW 14.1      Platelet Count 304      % Neutrophils 45      % Lymphocytes 43      % Monocytes 8      % Eosinophils 3      % Basophils 1      % Immature Granulocytes 0      NRBCs per 100 WBC 0      Absolute Neutrophils 2.0      Absolute Lymphocytes 1.9      Absolute Monocytes 0.4      Absolute Eosinophils 0.1      Absolute Basophils 0.0      Absolute Immature Granulocytes 0.0      Absolute NRBCs 0.0        Emergency Department Course:         Reviewed:  I reviewed nursing notes, vitals, past medical history and Care Everywhere    Assessments:  1204 I obtained history and examined the patient as noted above.     1409 I rechecked the patient and explained findings.     Interventions:  1237 Zofran 4mg IV  1238 Dilaudid 1mg IV    Disposition:  The patient was discharged to home.     Impression & Plan     Medical Decision Making:  This patient is a pleasant 46-year-old female with known hiatal hernia who presents today with diffuse lower abdominal pain.  She had tenderness to palpation, concerning me for something like appendicitis or diverticulitis.  Otherwise there was no diarrhea, constipation, vomiting.  With her known hiatal hernia there was no complaints of chest pain or shortness of breath or back pain.  CT of her abdomen and pelvis was obtained to look for possible infections.  This is come back is relatively unremarkable showing a duplicated left renal collecting system as well as a moderate hiatal hernia but no other complications or abnormalities.  This was discussed with the patient.  I do not know exactly what is causing her pain right now but feels comfortable that we evaluated for acute and life-threatening causes and rule these out.  I think it is reasonable for her to be discharged home with some pain meds and follow-up with her primary doctor or the GI surgeon she was talking to about the hiatal hernia.  She feels comfortable with this plan.    Diagnosis:    ICD-10-CM    1. Abdominal pain,  generalized  R10.84    2. Hiatal hernia  K44.9        Discharge Medications:  New Prescriptions    HYDROCODONE-ACETAMINOPHEN (NORCO) 5-325 MG TABLET    Take 1 tablet by mouth every 6 hours as needed for severe pain       Scribe Disclosure:  I, JEFF SCHWARTZ, am serving as a scribe at 12:04 PM on 5/26/2022 to document services personally performed by Monse Saini MD based on my observations and the provider's statements to me.     I, Khoa Junior, am serving as a scribe  at 1:25 PM on 5/26/2022 to document services personally performed by Monse Saini MD based on my observations and the provider's statements to me.            Monse Saini MD  05/26/22 0589

## 2022-05-26 NOTE — ED NOTES
Bed: ED17  Expected date:   Expected time:   Means of arrival:   Comments:  John [id number], F, abd pain, hernia

## 2022-05-26 NOTE — ED TRIAGE NOTES
Pt with RLQ pain since Tuesday, worse today. Has known hx hernia to area. Still passing gas. No n/v/d or dysuria. ABC intact.

## 2022-06-03 ENCOUNTER — INFUSION THERAPY VISIT (OUTPATIENT)
Dept: INFUSION THERAPY | Facility: CLINIC | Age: 47
End: 2022-06-03
Attending: PSYCHIATRY & NEUROLOGY
Payer: COMMERCIAL

## 2022-06-03 VITALS
DIASTOLIC BLOOD PRESSURE: 79 MMHG | HEART RATE: 70 BPM | OXYGEN SATURATION: 97 % | TEMPERATURE: 98.6 F | SYSTOLIC BLOOD PRESSURE: 128 MMHG

## 2022-06-03 DIAGNOSIS — G43.711 CHRONIC MIGRAINE WITHOUT AURA, WITH INTRACTABLE MIGRAINE, SO STATED, WITH STATUS MIGRAINOSUS: ICD-10-CM

## 2022-06-03 DIAGNOSIS — M62.838 SPASM OF MUSCLE: Primary | ICD-10-CM

## 2022-06-03 DIAGNOSIS — G43.111 INTRACTABLE MIGRAINE WITH AURA WITH STATUS MIGRAINOSUS: ICD-10-CM

## 2022-06-03 PROCEDURE — 96365 THER/PROPH/DIAG IV INF INIT: CPT

## 2022-06-03 PROCEDURE — 250N000011 HC RX IP 250 OP 636

## 2022-06-03 PROCEDURE — 258N000003 HC RX IP 258 OP 636

## 2022-06-03 RX ORDER — ACETAMINOPHEN 325 MG/1
650 TABLET ORAL EVERY 4 HOURS PRN
Status: CANCELLED
Start: 2022-08-25

## 2022-06-03 RX ORDER — LORATADINE 10 MG/1
10 TABLET ORAL ONCE
Status: CANCELLED
Start: 2022-08-25 | End: 2022-08-25

## 2022-06-03 RX ORDER — HEPARIN SODIUM,PORCINE 10 UNIT/ML
5 VIAL (ML) INTRAVENOUS
Status: CANCELLED | OUTPATIENT
Start: 2022-08-25

## 2022-06-03 RX ORDER — HEPARIN SODIUM (PORCINE) LOCK FLUSH IV SOLN 100 UNIT/ML 100 UNIT/ML
5 SOLUTION INTRAVENOUS
Status: CANCELLED | OUTPATIENT
Start: 2022-08-25

## 2022-06-03 RX ADMIN — SODIUM CHLORIDE 250 ML: 9 INJECTION, SOLUTION INTRAVENOUS at 11:32

## 2022-06-03 RX ADMIN — EPTINEZUMAB-JJMR 100 MG: 100 INJECTION INTRAVENOUS at 11:32

## 2022-06-03 NOTE — PROGRESS NOTES
Infusion Nursing Note:  Holli ROOSEVELT Linoey Kedar presents today for Vyepti.    Patient seen by provider today: No   present during visit today: Not Applicable.    Note:  PIV flushed with 20ml NS before and after infusion.    Reports some nausea and discomfort from hiatal hernia.    Intravenous Access:  Peripheral IV placed.    Treatment Conditions:  Not Applicable.      Post Infusion Assessment:  Patient tolerated infusion without incident.  Blood return noted pre and post infusion.  Site patent and intact, free from redness, edema or discomfort.  No evidence of extravasations.  Access discontinued per protocol.       Discharge Plan:   Discharge instructions reviewed with: Patient.  Patient and/or family verbalized understanding of discharge instructions and all questions answered.  AVS to patient via [employer].  Patient will return 6/10/22 for next appointment.   Patient discharged in stable condition accompanied by: self.  Departure Mode: Ambulatory.      Geovanna Swann RN

## 2022-06-10 ENCOUNTER — INFUSION THERAPY VISIT (OUTPATIENT)
Dept: INFUSION THERAPY | Facility: CLINIC | Age: 47
End: 2022-06-10
Attending: PSYCHIATRY & NEUROLOGY
Payer: COMMERCIAL

## 2022-06-10 VITALS
TEMPERATURE: 99.2 F | DIASTOLIC BLOOD PRESSURE: 77 MMHG | HEART RATE: 72 BPM | OXYGEN SATURATION: 96 % | SYSTOLIC BLOOD PRESSURE: 128 MMHG

## 2022-06-10 DIAGNOSIS — L50.1 URTICARIA, IDIOPATHIC: Primary | ICD-10-CM

## 2022-06-10 PROCEDURE — 250N000011 HC RX IP 250 OP 636

## 2022-06-10 PROCEDURE — 96372 THER/PROPH/DIAG INJ SC/IM: CPT

## 2022-06-10 RX ADMIN — OMALIZUMAB 300 MG: 150 INJECTION, SOLUTION SUBCUTANEOUS at 12:59

## 2022-06-10 NOTE — PROGRESS NOTES
Infusion Nursing Note:  Holli ROOSEVELT Linoey Kedar presents today for Xolair.    Patient seen by provider today: No   present during visit today: Not Applicable.    Note: N/A.    Intravenous Access:  No Intravenous access/labs at this visit.    Treatment Conditions:  Not Applicable.    Post Infusion Assessment:  Patient tolerated injection without incident.  Patient observed for 30 minutes post Xolair per protocol.  Site patent and intact, free from redness, edema or discomfort.  No evidence of extravasations.     Discharge Plan:   Discharge instructions reviewed with: Patient.  Patient and/or family verbalized understanding of discharge instructions and all questions answered.  AVS to patient via [employer].  Patient will call for next appointment.   Patient discharged in stable condition accompanied by: self.  Departure Mode: Ambulatory.      Geovanna Swann RN

## 2022-06-22 NOTE — ED AVS SNAPSHOT
[facility]  [address]  [facility] [address]  Phone: [phone]  Fax: [phone]                                    Holli Thacker   MRN: [id number]    Department: [facility]   Date of Visit: 12/10/2020           After Visit Summary Signature Page    I have received my discharge instructions, and my questions have been answered. I have discussed any challenges I see with this plan with the nurse or doctor.    ..........................................................................................................................................  Patient/Patient Representative Signature      ..........................................................................................................................................  Patient Representative Print Name and Relationship to Patient    ..................................................               ................................................  Date                                   Time    ..........................................................................................................................................  Reviewed by Signature/Title    ...................................................              ..............................................  Date                                               Time          22EPIC Rev 08/18       
Imaging Studies

## 2022-07-11 ENCOUNTER — INFUSION THERAPY VISIT (OUTPATIENT)
Dept: INFUSION THERAPY | Facility: CLINIC | Age: 47
End: 2022-07-11
Attending: ALLERGY & IMMUNOLOGY
Payer: COMMERCIAL

## 2022-07-11 VITALS
DIASTOLIC BLOOD PRESSURE: 76 MMHG | SYSTOLIC BLOOD PRESSURE: 114 MMHG | TEMPERATURE: 99.3 F | OXYGEN SATURATION: 97 % | HEART RATE: 76 BPM

## 2022-07-11 DIAGNOSIS — L50.1 URTICARIA, IDIOPATHIC: Primary | ICD-10-CM

## 2022-07-11 PROCEDURE — 96372 THER/PROPH/DIAG INJ SC/IM: CPT

## 2022-07-11 PROCEDURE — 250N000011 HC RX IP 250 OP 636

## 2022-07-11 RX ADMIN — OMALIZUMAB 300 MG: 150 INJECTION, SOLUTION SUBCUTANEOUS at 15:16

## 2022-07-11 ASSESSMENT — PAIN SCALES - GENERAL: PAINLEVEL: NO PAIN (0)

## 2022-07-11 NOTE — PROGRESS NOTES
Infusion Nursing Note:  Holli Thacker presents today for Xolair.    Patient seen by provider today: No   present during visit today: Not Applicable.    Note: No new complaints.  Has a few hives on her legs.  This occurs when she's close to being due for her injection.  She said she's a little over due now.    Is moving to [place].  They are working on home self injections for her.        Treatment Conditions:  Not Applicable.    Post Infusion Assessment:  Patient tolerated subcutaneous Xolair injection, in 2 divided syringes,  without incident.  One injection to each arm.  Patient observed for 30 minutes post Xolair per protocol.     Discharge Plan:   AVS to patient via [employer].  Patient will return No follow up scheduled as is moving for next appointment.   Patient discharged in stable condition accompanied by: self.  Departure Mode: Ambulatory.  Face to Face time: 0.      Jackie Padilla RN

## 2022-10-10 ENCOUNTER — HEALTH MAINTENANCE LETTER (OUTPATIENT)
Age: 47
End: 2022-10-10

## 2023-02-18 ENCOUNTER — HEALTH MAINTENANCE LETTER (OUTPATIENT)
Age: 48
End: 2023-02-18

## 2023-05-27 ENCOUNTER — HEALTH MAINTENANCE LETTER (OUTPATIENT)
Age: 48
End: 2023-05-27

## 2024-03-16 ENCOUNTER — HEALTH MAINTENANCE LETTER (OUTPATIENT)
Age: 49
End: 2024-03-16

## 2025-03-22 ENCOUNTER — HEALTH MAINTENANCE LETTER (OUTPATIENT)
Age: 50
End: 2025-03-22

## 2025-06-14 ENCOUNTER — HEALTH MAINTENANCE LETTER (OUTPATIENT)
Age: 50
End: 2025-06-14

## (undated) DEVICE — SOL NACL 0.9% IRRIG 3000ML BAG 2B7477

## (undated) DEVICE — SYR 10ML SLIP TIP W/O NDL 303134

## (undated) DEVICE — ENDO TROCAR 05MM VERSAONE BLADELESS W/STD FIX CAN NONB5STF

## (undated) DEVICE — SUCTION CANISTER MEDIVAC LINER 3000ML W/LID 65651-530

## (undated) DEVICE — ESU HANDPIECE BIPOLAR THUNDERBEAT FC 5MMX35CM TB-0535FC

## (undated) DEVICE — BAG CLEAR TRASH 1.3M 39X33" P4040C

## (undated) DEVICE — SU VICRYL 4-0 PS-2 18" UND J496H

## (undated) DEVICE — SU VICRYL 0 CT-2 27" J334H

## (undated) DEVICE — SOL WATER IRRIG 1000ML BOTTLE 2F7114

## (undated) DEVICE — LINEN HALF SHEET 5512

## (undated) DEVICE — LINEN FULL SHEET 5511

## (undated) DEVICE — ESU GROUND PAD ADULT W/CORD E7507

## (undated) DEVICE — GLOVE PROTEXIS W/NEU-THERA 7.5  2D73TE75

## (undated) DEVICE — ENDO TROCAR FIRST ENTRY KII FIOS Z-THRD 05X100MM CTF03

## (undated) DEVICE — DEVICE SUTURE GRASPER TROCAR CLOSURE 14GA PMITCSG

## (undated) DEVICE — DRAPE MAYO STAND 23X54 8337

## (undated) DEVICE — SU DERMABOND MINI DHVM12

## (undated) DEVICE — CATH TRAY FOLEY SURESTEP 16FR DRAIN BAG STATOCK A899916

## (undated) DEVICE — GLOVE PROTEXIS MICRO 8.0  2D73PM80

## (undated) DEVICE — ENDO TROCAR 12MM VERSAONE BLADELESS W/STD FIX CAN NONB12STF

## (undated) DEVICE — PACK GYN LAPAROSCOPY RIDGES

## (undated) DEVICE — NDL INSUFFLATION 14GA STEP S100000

## (undated) DEVICE — ENDO CANNULA 05MM VERSAONE UNIVERSAL UNVCA5STF

## (undated) DEVICE — SUCTION IRR STRYKERFLOW II W/TIP 250-070-520

## (undated) DEVICE — GLOVE PROTEXIS BLUE W/NEU-THERA 8.0  2D73EB80

## (undated) RX ORDER — PHENYLEPHRINE HCL IN 0.9% NACL 1 MG/10 ML
SYRINGE (ML) INTRAVENOUS
Status: DISPENSED
Start: 2018-03-17

## (undated) RX ORDER — FENTANYL CITRATE 50 UG/ML
INJECTION, SOLUTION INTRAMUSCULAR; INTRAVENOUS
Status: DISPENSED
Start: 2018-03-17

## (undated) RX ORDER — LIDOCAINE HYDROCHLORIDE 10 MG/ML
INJECTION, SOLUTION EPIDURAL; INFILTRATION; INTRACAUDAL; PERINEURAL
Status: DISPENSED
Start: 2018-03-17

## (undated) RX ORDER — PROPOFOL 10 MG/ML
INJECTION, EMULSION INTRAVENOUS
Status: DISPENSED
Start: 2018-03-17

## (undated) RX ORDER — GLYCOPYRROLATE 0.2 MG/ML
INJECTION INTRAMUSCULAR; INTRAVENOUS
Status: DISPENSED
Start: 2018-03-17

## (undated) RX ORDER — KETOROLAC TROMETHAMINE 30 MG/ML
INJECTION, SOLUTION INTRAMUSCULAR; INTRAVENOUS
Status: DISPENSED
Start: 2018-03-17

## (undated) RX ORDER — ONDANSETRON 2 MG/ML
INJECTION INTRAMUSCULAR; INTRAVENOUS
Status: DISPENSED
Start: 2018-03-17

## (undated) RX ORDER — NEOSTIGMINE METHYLSULFATE 1 MG/ML
VIAL (ML) INJECTION
Status: DISPENSED
Start: 2018-03-17

## (undated) RX ORDER — DEXAMETHASONE SODIUM PHOSPHATE 4 MG/ML
INJECTION, SOLUTION INTRA-ARTICULAR; INTRALESIONAL; INTRAMUSCULAR; INTRAVENOUS; SOFT TISSUE
Status: DISPENSED
Start: 2018-03-17

## (undated) RX ORDER — HYDROMORPHONE HYDROCHLORIDE 1 MG/ML
INJECTION, SOLUTION INTRAMUSCULAR; INTRAVENOUS; SUBCUTANEOUS
Status: DISPENSED
Start: 2018-03-17

## (undated) RX ORDER — DIPHENHYDRAMINE HYDROCHLORIDE 50 MG/ML
INJECTION INTRAMUSCULAR; INTRAVENOUS
Status: DISPENSED
Start: 2018-03-17